# Patient Record
Sex: MALE | Race: WHITE | NOT HISPANIC OR LATINO | Employment: UNEMPLOYED | URBAN - NONMETROPOLITAN AREA
[De-identification: names, ages, dates, MRNs, and addresses within clinical notes are randomized per-mention and may not be internally consistent; named-entity substitution may affect disease eponyms.]

---

## 2017-02-06 DIAGNOSIS — K21.9 GASTROESOPHAGEAL REFLUX DISEASE WITHOUT ESOPHAGITIS: ICD-10-CM

## 2017-02-06 DIAGNOSIS — K20.90 BARRETT'S ESOPHAGUS WITH ESOPHAGITIS: Primary | ICD-10-CM

## 2017-02-06 DIAGNOSIS — K22.70 BARRETT'S ESOPHAGUS WITH ESOPHAGITIS: Primary | ICD-10-CM

## 2017-02-06 NOTE — TELEPHONE ENCOUNTER
Omeprazole      Last Written Prescription Date: 7/12/2016  Last Fill Quantity: 60,  # refills: 6   Last Office Visit with G, P or [facility] prescribing provider: 3-

## 2017-02-06 NOTE — TELEPHONE ENCOUNTER
Prescription approved per [facility] Refill Protocol.    Kimberly Das RN  [facility]

## 2017-05-10 ENCOUNTER — TELEPHONE (OUTPATIENT)
Dept: FAMILY MEDICINE | Facility: OTHER | Age: 52
End: 2017-05-10

## 2017-06-15 ENCOUNTER — OFFICE VISIT (OUTPATIENT)
Dept: FAMILY MEDICINE | Facility: CLINIC | Age: 52
End: 2017-06-15
Payer: COMMERCIAL

## 2017-06-15 VITALS
DIASTOLIC BLOOD PRESSURE: 70 MMHG | SYSTOLIC BLOOD PRESSURE: 116 MMHG | TEMPERATURE: 97.4 F | BODY MASS INDEX: 38.97 KG/M2 | HEART RATE: 80 BPM | RESPIRATION RATE: 16 BRPM | WEIGHT: 287.75 LBS | HEIGHT: 72 IN

## 2017-06-15 DIAGNOSIS — K20.90 BARRETT'S ESOPHAGUS WITH ESOPHAGITIS: Primary | ICD-10-CM

## 2017-06-15 DIAGNOSIS — K21.00 GASTROESOPHAGEAL REFLUX DISEASE WITH ESOPHAGITIS: ICD-10-CM

## 2017-06-15 DIAGNOSIS — K22.70 BARRETT'S ESOPHAGUS WITH ESOPHAGITIS: Primary | ICD-10-CM

## 2017-06-15 DIAGNOSIS — K21.9 GASTROESOPHAGEAL REFLUX DISEASE WITHOUT ESOPHAGITIS: ICD-10-CM

## 2017-06-15 DIAGNOSIS — E78.5 HYPERLIPIDEMIA LDL GOAL <130: ICD-10-CM

## 2017-06-15 PROCEDURE — 99214 OFFICE O/P EST MOD 30 MIN: CPT | Performed by: PHYSICIAN ASSISTANT

## 2017-06-15 RX ORDER — OMEPRAZOLE 40 MG/1
CAPSULE, DELAYED RELEASE ORAL
Qty: 90 CAPSULE | Refills: 1 | Status: SHIPPED | OUTPATIENT
Start: 2017-06-15 | End: 2017-12-21

## 2017-06-15 ASSESSMENT — PAIN SCALES - GENERAL: PAINLEVEL: NO PAIN (0)

## 2017-06-15 NOTE — PROGRESS NOTES
SUBJECTIVE:                                                    Amol Peres is a 52 year old male who presents to clinic today for the following health issues:      Medication Followup of Prilosec    Taking Medication as prescribed: yes    Side Effects:  None    Medication Helping Symptoms:  yes     Problem list and histories reviewed & adjusted, as indicated.  Additional history: as documented    Patient Active Problem List   Diagnosis     Gastroesophageal reflux disease with esophagitis     Harmon's esophagus with esophagitis     HYPERLIPIDEMIA LDL GOAL <130     Hypertension goal BP (blood pressure) < 140/90     Pain in thoracic spine     CTS (carpal tunnel syndrome)     Chondromalacia patellae of left knee     Morbid obesity with body mass index of 40.0-44.9 in adult (H)     Tobacco use disorder     Mild airflow obstruction on pulmonary function test     Past Surgical History:   Procedure Laterality Date     APPENDECTOMY  04/04/10     COLONOSCOPY N/A 10/16/2015    Procedure: COMBINED COLONOSCOPY, SINGLE OR MULTIPLE BIOPSY/POLYPECTOMY BY BIOPSY;  Surgeon: Marcial Colvin MD;  Location:  GI     ESOPHAGOSCOPY, GASTROSCOPY, DUODENOSCOPY (EGD), COMBINED  12/27/2010    COMBINED ESOPHAGOSCOPY, GASTROSCOPY, DUODENOSCOPY (EGD), BIOPSY SINGLE OR MULTIPLE performed by JUSTINA MORGAN at  GI     ESOPHAGOSCOPY, GASTROSCOPY, DUODENOSCOPY (EGD), COMBINED  12/26/2012    Procedure: COMBINED ESOPHAGOSCOPY, GASTROSCOPY, DUODENOSCOPY (EGD), BIOPSY SINGLE OR MULTIPLE;  ESOPHAGOSCOPY, GASTROSCOPY, DUODENOSCOPY WITH  SINGLE BIOPSY;  Surgeon: Justina Morgan MD;  Location:  GI     ESOPHAGOSCOPY, GASTROSCOPY, DUODENOSCOPY (EGD), COMBINED N/A 10/16/2015    Procedure: COMBINED ESOPHAGOSCOPY, GASTROSCOPY, DUODENOSCOPY (EGD), BIOPSY SINGLE OR MULTIPLE;  Surgeon: Marcial Colvin MD;  Location: [facility]     HC UGI ENDOSCOPY DIAG W BIOPSY  8/28/2009     HC UGI ENDOSCOPY, SIMPLE EXAM  09/23/08       Social History  "  Substance Use Topics     Smoking status: Current Every Day Smoker     Packs/day: 1.00     Last attempt to quit: 7/7/2014     Smokeless tobacco: Never Used     Alcohol use Yes      Comment: occasionally     Family History   Problem Relation Age of Onset     CANCER Mother      Cervical     CANCER Maternal Grandfather      CANCER Paternal Grandmother            Reviewed and updated as needed this visit by clinical staff  Tobacco  Allergies  Med Hx  Surg Hx  Fam Hx  Soc Hx      Reviewed and updated as needed this visit by Provider         ROS:  Constitutional, HEENT, cardiovascular, pulmonary, gi and gu systems are negative, except as otherwise noted.    OBJECTIVE:                                                    /70 (BP Location: Right arm, Patient Position: Chair, Cuff Size: Adult Large)  Pulse 80  Temp 97.4  F (36.3  C) (Tympanic)  Resp 16  Ht 6' 0.3\" (1.836 m)  Wt 287 lb 12 oz (130.5 kg)  BMI 38.7 kg/m2  Body mass index is 38.7 kg/(m^2).  GENERAL: healthy, alert and no distress  RESP: lungs clear to auscultation - no rales, rhonchi or wheezes  CV: regular rate and rhythm, normal S1 S2, no S3 or S4, no murmur, click or rub, no peripheral edema and peripheral pulses strong  ABDOMEN: soft, nontender, no hepatosplenomegaly, no masses and bowel sounds normal  MS: no gross musculoskeletal defects noted, no edema  PSYCH: mentation appears normal, affect normal/bright    Diagnostic Test Results:  No results found for this or any previous visit (from the past 24 hour(s)).     ASSESSMENT/PLAN:                                                    BMI:   Estimated body mass index is 38.7 kg/(m^2) as calculated from the following:    Height as of this encounter: 6' 0.3\" (1.836 m).    Weight as of this encounter: 287 lb 12 oz (130.5 kg).   Weight management plan: Discussed healthy diet and exercise guidelines and patient will follow up in 6 months in clinic to re-evaluate.      1. Harmon's esophagus with " esophagitis  Needs recheck of UGI in October of this year  - omeprazole (PRILOSEC) 40 MG capsule; TAKE 1 CAPSULE BY MOUTH DAILY  Dispense: 90 capsule; Refill: 1  - Upper GI Endoscopy (Esophagogastroduodenoscopy/EGD) [63158]; Future  - GASTROENTEROLOGY ADULT REF PROCEDURE ONLY; Future    2. Gastroesophageal reflux disease without esophagitis  Doing well, recheck in 1 year  - omeprazole (PRILOSEC) 40 MG capsule; TAKE 1 CAPSULE BY MOUTH DAILY  Dispense: 90 capsule; Refill: 1  - Upper GI Endoscopy (Esophagogastroduodenoscopy/EGD) [16805]; Future  - GASTROENTEROLOGY ADULT REF PROCEDURE ONLY; Future    3. Gastroesophageal reflux disease with esophagitis  As above  - Upper GI Endoscopy (Esophagogastroduodenoscopy/EGD) [28970]; Future  - GASTROENTEROLOGY ADULT REF PROCEDURE ONLY; Future    4. Hyperlipidemia LDL goal <130  Due for labs soon.  - Lipid Profile with reflex to direct LDL; Future  Arnulfo Reno PA-C  [employer]

## 2017-06-15 NOTE — NURSING NOTE
"Chief Complaint   Patient presents with     Recheck Medication     prilosec       Initial /70 (BP Location: Right arm, Patient Position: Chair, Cuff Size: Adult Large)  Pulse 80  Temp 97.4  F (36.3  C) (Tympanic)  Resp 16  Ht 6' 0.3\" (1.836 m)  Wt 287 lb 12 oz (130.5 kg)  BMI 38.7 kg/m2 Estimated body mass index is 38.7 kg/(m^2) as calculated from the following:    Height as of this encounter: 6' 0.3\" (1.836 m).    Weight as of this encounter: 287 lb 12 oz (130.5 kg).  Medication Reconciliation: complete       Domenica MOSQUEDA LPN    "

## 2017-06-15 NOTE — MR AVS SNAPSHOT
After Visit Summary   6/15/2017    Amol Peres    MRN: [id number]           Patient Information     Date Of Birth          1965        Visit Information        Provider Department      6/15/2017 5:00 PM Arnulfo Damico PA-C [facility]        Today's Diagnoses     Harmon's esophagus with esophagitis    -  1    Gastroesophageal reflux disease without esophagitis        Gastroesophageal reflux disease with esophagitis        Hyperlipidemia LDL goal <130           Follow-ups after your visit        Additional Services     GASTROENTEROLOGY ADULT REF PROCEDURE ONLY       Last Lab Result: Creatinine (mg/dL)       Date                     Value                 03/16/2016               1.23             ----------  Body mass index is 38.7 kg/(m^2).     Needed:  No  Language:  English    Patient will be contacted to schedule procedure.     Please be aware that coverage of these services is subject to the terms and limitations of your health insurance plan.  Call member services at your health plan with any benefit or coverage questions.  Any procedures must be performed at a [place] facility OR coordinated by your clinic's referral office.    Please bring the following with you to your appointment:    (1) Any X-Rays, CTs or MRIs which have been performed.  Contact the facility where they were done to arrange for  prior to your scheduled appointment.    (2) List of current medications   (3) This referral request   (4) Any documents/labs given to you for this referral                  Future tests that were ordered for you today     Open Future Orders        Priority Expected Expires Ordered    Lipid Profile with reflex to direct LDL Routine  9/15/2017 6/15/2017    Upper GI Endoscopy (Esophagogastroduodenoscopy/EGD) [28898] Routine  12/15/2017 6/15/2017    GASTROENTEROLOGY ADULT REF PROCEDURE ONLY Routine 10/19/2017 11/30/2017 6/15/2017            Who to contact     If  "you have questions or need follow up information about today's clinic visit or your schedule please contact [facility] directly at [phone].  Normal or non-critical lab and imaging results will be communicated to you by [employer], letter or phone within 4 business days after the clinic has received the results. If you do not hear from us within 7 days, please contact the clinic through [employer] or phone. If you have a critical or abnormal lab result, we will notify you by phone as soon as possible.  Submit refill requests through [employer] or call your pharmacy and they will forward the refill request to us. Please allow 3 business days for your refill to be completed.          Additional Information About Your Visit        [employer] Information     [employer] lets you send messages to your doctor, view your test results, renew your prescriptions, schedule appointments and more. To sign up, go to [email or web address] . Click on \"Log in\" on the left side of the screen, which will take you to the Welcome page. Then click on \"Sign up Now\" on the right side of the page.     You will be asked to enter the access code listed below, as well as some personal information. Please follow the directions to create your username and password.     Your access code is: [email or web address]  Expires: 2017  5:12 PM     Your access code will  in 90 days. If you need help or a new code, please call your [place] clinic or [phone].        Care EveryWhere ID     This is your Care EveryWhere ID. This could be used by other organizations to access your [place] medical records  [id number]        Your Vitals Were     Pulse Temperature Respirations Height BMI (Body Mass Index)       80 97.4  F (36.3  C) (Tympanic) 16 6' 0.3\" (1.836 m) 38.7 kg/m2        Blood Pressure from Last 3 Encounters:   06/15/17 116/70   16 128/72   10/16/15 129/83    Weight from Last 3 Encounters:   06/15/17 287 lb 12 oz (130.5 kg) " "  03/16/16 (!) 302 lb (137 kg)   11/28/14 292 lb 14.4 oz (132.9 kg)                 Today's Medication Changes          These changes are accurate as of: 6/15/17  5:12 PM.  If you have any questions, ask your nurse or doctor.               These medicines have changed or have updated prescriptions.        Dose/Directions    omeprazole 40 MG capsule   Commonly known as:  priLOSEC   This may have changed:  See the new instructions.   Used for:  Harmon's esophagus with esophagitis, Gastroesophageal reflux disease without esophagitis   Changed by:  Arnulfo Damico PA-C        TAKE 1 CAPSULE BY MOUTH DAILY   Quantity:  90 capsule   Refills:  1            Where to get your medicines      These medications were sent to [id number] - [place], [place] - [address]  [address] [place] [address]     Phone:  [phone]     omeprazole 40 MG capsule                Primary Care Provider    None Specified       No primary provider on file.        Thank you!     Thank you for choosing [facility]  for your care. Our goal is always to provide you with excellent care. Hearing back from our patients is one way we can continue to improve our services. Please take a few minutes to complete the written survey that you may receive in the mail after your visit with us. Thank you!             Your Updated Medication List - Protect others around you: Learn how to safely use, store and throw away your medicines at www.disposemymeds.org.          This list is accurate as of: 6/15/17  5:12 PM.  Always use your most recent med list.                   Brand Name Dispense Instructions for use    ibuprofen 800 MG tablet    ADVIL/MOTRIN    100 tablet    TAKE ONE TABLET BY MOUTH THREE TIMES A DAY       omeprazole 40 MG capsule    priLOSEC    90 capsule    TAKE 1 CAPSULE BY MOUTH DAILY       order for DME     1 Units    Equipment being ordered: \"cock-up splints for carpal tunnel\"  1 Pair for carpal tunnel.       sildenafil 100 MG " cap/tab    VIAGRA    12 tablet    Take 0.5-1 tablets ( mg) by mouth daily as needed for erectile dysfunction Take 30 min to 4 hours before intercourse.  Never use with nitroglycerin, terazosin or doxazosin.

## 2017-06-22 NOTE — TELEPHONE ENCOUNTER
Prescription was sent 6/15/17 for #90 with 1 refills.  Pharmacy notified via E-Prescribe refusal.     Kimberly Das RN  [facility]

## 2017-06-22 NOTE — TELEPHONE ENCOUNTER
Omeprazole      Last Written Prescription Date: 6/15/17  Last Fill Quantity: 90,  # refills: 1   Last Office Visit with G, P or [facility] prescribing provider: 6/15/17

## 2017-08-21 ENCOUNTER — TELEPHONE (OUTPATIENT)
Dept: SURGERY | Facility: CLINIC | Age: 52
End: 2017-08-21

## 2017-08-21 NOTE — TELEPHONE ENCOUNTER
Patient called and rescheduled procedure, now scheduled for 9/1/17 arrive 0630 procedure 0730.  Please mail EGD instructions

## 2017-08-21 NOTE — TELEPHONE ENCOUNTER
Patient called to schedule EGD on 9/29/17 arrive 0700 procedure 0800 with Dr. Colvin.  Please mail EGD instructions.

## 2017-09-01 ENCOUNTER — SURGERY (OUTPATIENT)
Age: 52
End: 2017-09-01

## 2017-09-01 ENCOUNTER — HOSPITAL ENCOUNTER (OUTPATIENT)
Facility: CLINIC | Age: 52
Discharge: HOME | End: 2017-09-01
Attending: INTERNAL MEDICINE | Admitting: INTERNAL MEDICINE
Payer: COMMERCIAL

## 2017-09-01 VITALS
RESPIRATION RATE: 16 BRPM | TEMPERATURE: 97.9 F | OXYGEN SATURATION: 98 % | DIASTOLIC BLOOD PRESSURE: 80 MMHG | SYSTOLIC BLOOD PRESSURE: 131 MMHG

## 2017-09-01 LAB — UPPER GI ENDOSCOPY: NORMAL

## 2017-09-01 PROCEDURE — 40000296 ZZH STATISTIC ENDO RECOVERY CLASS 1:2 FIRST HOUR: Performed by: INTERNAL MEDICINE

## 2017-09-01 PROCEDURE — 25000125 ZZHC RX 250: Performed by: INTERNAL MEDICINE

## 2017-09-01 PROCEDURE — 43239 EGD BIOPSY SINGLE/MULTIPLE: CPT | Performed by: INTERNAL MEDICINE

## 2017-09-01 PROCEDURE — 25000128 H RX IP 250 OP 636: Performed by: INTERNAL MEDICINE

## 2017-09-01 PROCEDURE — 88305 TISSUE EXAM BY PATHOLOGIST: CPT | Mod: 26 | Performed by: INTERNAL MEDICINE

## 2017-09-01 PROCEDURE — 88305 TISSUE EXAM BY PATHOLOGIST: CPT | Performed by: INTERNAL MEDICINE

## 2017-09-01 RX ORDER — ONDANSETRON 2 MG/ML
4 INJECTION INTRAMUSCULAR; INTRAVENOUS
Status: DISCONTINUED | OUTPATIENT
Start: 2017-09-01 | End: 2017-09-01 | Stop reason: HOSPADM

## 2017-09-01 RX ORDER — LIDOCAINE 40 MG/G
CREAM TOPICAL
Status: DISCONTINUED | OUTPATIENT
Start: 2017-09-01 | End: 2017-09-01 | Stop reason: HOSPADM

## 2017-09-01 RX ORDER — FENTANYL CITRATE 50 UG/ML
INJECTION, SOLUTION INTRAMUSCULAR; INTRAVENOUS PRN
Status: DISCONTINUED | OUTPATIENT
Start: 2017-09-01 | End: 2017-09-01 | Stop reason: HOSPADM

## 2017-09-01 RX ADMIN — MIDAZOLAM HYDROCHLORIDE 1 MG: 1 INJECTION, SOLUTION INTRAMUSCULAR; INTRAVENOUS at 07:49

## 2017-09-01 RX ADMIN — MIDAZOLAM HYDROCHLORIDE 1 MG: 1 INJECTION, SOLUTION INTRAMUSCULAR; INTRAVENOUS at 07:42

## 2017-09-01 RX ADMIN — LIDOCAINE HYDROCHLORIDE 1 ML: 10 INJECTION, SOLUTION EPIDURAL; INFILTRATION; INTRACAUDAL; PERINEURAL at 07:02

## 2017-09-01 RX ADMIN — LIDOCAINE HYDROCHLORIDE 5 ML: 20 SOLUTION ORAL; TOPICAL at 07:40

## 2017-09-01 RX ADMIN — MIDAZOLAM HYDROCHLORIDE 1 MG: 1 INJECTION, SOLUTION INTRAMUSCULAR; INTRAVENOUS at 07:43

## 2017-09-01 RX ADMIN — FENTANYL CITRATE 50 MCG: 50 INJECTION, SOLUTION INTRAMUSCULAR; INTRAVENOUS at 07:40

## 2017-09-01 RX ADMIN — MIDAZOLAM HYDROCHLORIDE 2 MG: 1 INJECTION, SOLUTION INTRAMUSCULAR; INTRAVENOUS at 07:40

## 2017-09-01 RX ADMIN — FENTANYL CITRATE 50 MCG: 50 INJECTION, SOLUTION INTRAMUSCULAR; INTRAVENOUS at 07:46

## 2017-09-01 RX ADMIN — MIDAZOLAM HYDROCHLORIDE 1 MG: 1 INJECTION, SOLUTION INTRAMUSCULAR; INTRAVENOUS at 07:41

## 2017-09-01 NOTE — H&P
[facility] GI Pre-Procedure Physical Assessment    Amol Peres MRN# [id number]   Age: 52 year old YOB: 1965      Date of Surgery: 9/1/2017  Location [facility]      Date of Exam 9/1/2017 Facility (Same day)       Home clinic: [facility]  Primary care provider: No primary care provider on file.         Active problem list:   Patient Active Problem List   Diagnosis     Gastroesophageal reflux disease with esophagitis     Harmon's esophagus with esophagitis     HYPERLIPIDEMIA LDL GOAL <130     Hypertension goal BP (blood pressure) < 140/90     Pain in thoracic spine     CTS (carpal tunnel syndrome)     Chondromalacia patellae of left knee     Morbid obesity with body mass index of 40.0-44.9 in adult (H)     Tobacco use disorder     Mild airflow obstruction on pulmonary function test            Medications (include herbals and vitamins):   Any Plavix use in the last 7 days?  No     Current Facility-Administered Medications   Medication     lidocaine 1 % 1 mL     lidocaine (LMX4) kit     sodium chloride (PF) 0.9% PF flush 3 mL     sodium chloride (PF) 0.9% PF flush 3 mL     sodium chloride (PF) 0.9% PF flush 3 mL     ondansetron (ZOFRAN) injection 4 mg             Allergies:      Allergies   Allergen Reactions     No Known Drug Allergies      Allergy to Latex?  No  Allergy to tape?    No          Social History:     Social History   Substance Use Topics     Smoking status: Current Every Day Smoker     Packs/day: 1.00     Last attempt to quit: 7/7/2014     Smokeless tobacco: Never Used     Alcohol use Yes      Comment: occasionally            Physical Exam:   All vitals have been reviewed  Blood pressure 132/87, temperature 97.9  F (36.6  C), temperature source Oral, resp. rate 16, SpO2 97 %.  Airway assessment:   Patient is able to open mouth wide  Patient is able to stick out tongue      Lungs:   No increased work of breathing, good air exchange, clear to  auscultation bilaterally, no crackles or wheezing      Cardiovascular:   Normal apical impulse, regular rate and rhythm, normal S1 and S2, no S3 or S4, and no murmur noted           Lab / Radiology Results:   All laboratory data reviewed          Assessment:   Appropriately NPO  Chief complaint or anatomic assessment of involved area: floyd's         Plan:   Moderate (conscious) sedation     Patient's active problems diagnostically and therapeutically optimized for the planned procedure  Risks, benefits, alternatives to sedation and blood explained and consent obtained  Risks, benefits, alternatives to procedure explained and consent obtained  P2 (patient with mild systemic disease)  Orders and progress notes are in the chart  Discharge from Phase 1 and / or Phase 2 recovery when patient meets criteria    I have reviewed the history and physical, lab finding(s), diagnostic data, medicaitons, and the plan for sedation.  I have determined this patient to be an appropriate candidate for the planned sedation / procedure and have reassessed the patient immediately prior to sedation / procedure.    I have personally and medically directed the administration of medications used.    Kevin Fung MD

## 2017-09-05 LAB — COPATH REPORT: NORMAL

## 2017-12-21 DIAGNOSIS — K22.70 BARRETT'S ESOPHAGUS WITH ESOPHAGITIS: ICD-10-CM

## 2017-12-21 DIAGNOSIS — K20.90 BARRETT'S ESOPHAGUS WITH ESOPHAGITIS: ICD-10-CM

## 2017-12-21 DIAGNOSIS — K21.9 GASTROESOPHAGEAL REFLUX DISEASE WITHOUT ESOPHAGITIS: ICD-10-CM

## 2017-12-21 NOTE — TELEPHONE ENCOUNTER
Reason for Call:  Medication or medication refill:    Do you use a [facility]?  Name of the pharmacy and phone number for the current request:  [facility]    Name of the medication requested: prilosec, will follow up with Arnulfo in [facility]     Other request: none    Can we leave a detailed message on this number? YES    Phone number patient can be reached at: Home number on file [phone] (home)    Best Time: any    Call taken on 12/21/2017 at 10:57 AM by Namita Figueredo

## 2017-12-22 RX ORDER — OMEPRAZOLE 40 MG/1
CAPSULE, DELAYED RELEASE ORAL
Qty: 90 CAPSULE | Refills: 0 | Status: SHIPPED | OUTPATIENT
Start: 2017-12-22 | End: 2018-02-27

## 2018-02-15 ENCOUNTER — TELEPHONE (OUTPATIENT)
Dept: FAMILY MEDICINE | Facility: OTHER | Age: 53
End: 2018-02-15

## 2018-02-15 DIAGNOSIS — Z11.59 NEED FOR HEPATITIS C SCREENING TEST: Primary | ICD-10-CM

## 2018-02-15 DIAGNOSIS — E78.5 HYPERLIPIDEMIA LDL GOAL <130: ICD-10-CM

## 2018-02-15 DIAGNOSIS — I10 HYPERTENSION GOAL BP (BLOOD PRESSURE) < 140/90: ICD-10-CM

## 2018-02-15 NOTE — TELEPHONE ENCOUNTER
Panel Management Review      Patient has the following on his problem list:     Hypertension   Last three blood pressure readings:  BP Readings from Last 3 Encounters:   09/01/17 131/80   06/15/17 116/70   03/16/16 128/72     Blood pressure: Passed    HTN Guidelines:  Age 18-59 BP range:  Less than 140/90  Age 60-85 with Diabetes:  Less than 140/90  Age 60-85 without Diabetes:  less than 150/90      Composite cancer screening  Chart review shows that this patient is due/due soon for the following None  Summary:    Patient is due/failing the following:   Hep c screen, bmp, lipids and FOLLOW UP    Action needed:   Patient needs office visit for medication follow up. Due now, please see if pt is willing to schedule. Patient needs fasting lab only appointment if not doing at appt. Order placed.      Type of outreach:    Phone, left message for patient to call back.     Questions for provider review:    None                                                                                                                                    Arelis Valentin CMA ([facility])       Chart routed to Care Team .

## 2018-02-21 NOTE — PROGRESS NOTES
SUBJECTIVE:   Amol Peres is a 52 year old male who presents to clinic today for the following health issues:      HPI  Medication Followup of Omeprazole    Taking Medication as prescribed: yes    Side Effects:  None    Medication Helping Symptoms:  yes     Joint Pain    Onset: Worse recently    Description:   Location: left knee  Character: Sharp, Dull ache, Stabbing, Gnawing, Burning, Fullness and Cramping    Intensity: mild, severe    Progression of Symptoms: worse    Accompanying Signs & Symptoms:  Other symptoms: weakness of Knee and swelling    History:   Previous similar pain: YES- not this bad      Precipitating factors:   Trauma or overuse: YES    Alleviating factors:  Improved by: nothing    Therapies Tried and outcome: Ibuprofen, rest,       Problem list and histories reviewed & adjusted, as indicated.  Additional history: as documented  The 10-year ASCVD risk score (Poyntellecb MONTES Jr, et al., 2013) is: 16.2%    Values used to calculate the score:      Age: 52 years      Sex: Male      Is Non- : No      Diabetic: No      Tobacco smoker: Yes      Systolic Blood Pressure: 131 mmHg      Is BP treated: No      HDL Cholesterol: 24 mg/dL      Total Cholesterol: 172 mg/dL  Patient is eligible for use of low-dose aspirin for primary prevention of heart attack and stroke.  Provider has discussed aspirin with patient and our decision was:     Contraindicated:  Daily low-dose aspirin for primary prevention contraindicated, provider does not recommend.    Patient Active Problem List   Diagnosis     Gastroesophageal reflux disease with esophagitis     Harmon's esophagus with esophagitis     HYPERLIPIDEMIA LDL GOAL <130     Hypertension goal BP (blood pressure) < 140/90     Pain in thoracic spine     CTS (carpal tunnel syndrome)     Chondromalacia patellae of left knee     Morbid obesity with body mass index of 40.0-44.9 in adult (H)     Tobacco use disorder     Mild airflow obstruction on  pulmonary function test     Gastroesophageal reflux disease without esophagitis     Past Surgical History:   Procedure Laterality Date     APPENDECTOMY  04/04/10     COLONOSCOPY N/A 10/16/2015    Procedure: COMBINED COLONOSCOPY, SINGLE OR MULTIPLE BIOPSY/POLYPECTOMY BY BIOPSY;  Surgeon: Marcial Colvin MD;  Location:  GI     ESOPHAGOSCOPY, GASTROSCOPY, DUODENOSCOPY (EGD), COMBINED  12/27/2010    COMBINED ESOPHAGOSCOPY, GASTROSCOPY, DUODENOSCOPY (EGD), BIOPSY SINGLE OR MULTIPLE performed by JUSTINA CAR at  GI     ESOPHAGOSCOPY, GASTROSCOPY, DUODENOSCOPY (EGD), COMBINED  12/26/2012    Procedure: COMBINED ESOPHAGOSCOPY, GASTROSCOPY, DUODENOSCOPY (EGD), BIOPSY SINGLE OR MULTIPLE;  ESOPHAGOSCOPY, GASTROSCOPY, DUODENOSCOPY WITH  SINGLE BIOPSY;  Surgeon: Justina Car MD;  Location:  GI     ESOPHAGOSCOPY, GASTROSCOPY, DUODENOSCOPY (EGD), COMBINED N/A 10/16/2015    Procedure: COMBINED ESOPHAGOSCOPY, GASTROSCOPY, DUODENOSCOPY (EGD), BIOPSY SINGLE OR MULTIPLE;  Surgeon: Marcial Colvin MD;  Location:  GI     ESOPHAGOSCOPY, GASTROSCOPY, DUODENOSCOPY (EGD), COMBINED N/A 9/1/2017    Procedure: COMBINED ESOPHAGOSCOPY, GASTROSCOPY, DUODENOSCOPY (EGD), BIOPSY SINGLE OR MULTIPLE;  Esophagogastroduodenoscopy, Biopsy by Biopsy;  Surgeon: Kevin Fung MD;  Location:  GI     HC UGI ENDOSCOPY DIAG W BIOPSY  8/28/2009     HC UGI ENDOSCOPY, SIMPLE EXAM  09/23/08       Social History   Substance Use Topics     Smoking status: Current Every Day Smoker     Packs/day: 1.00     Last attempt to quit: 7/7/2014     Smokeless tobacco: Never Used     Alcohol use Yes      Comment: occasionally     Family History   Problem Relation Age of Onset     CANCER Mother      Cervical     CANCER Maternal Grandfather      CANCER Paternal Grandmother          Current Outpatient Prescriptions   Medication Sig Dispense Refill     omeprazole (PRILOSEC) 40 MG capsule TAKE 1 CAPSULE BY MOUTH DAILY 90 capsule 1     ibuprofen  "(ADVIL/MOTRIN) 800 MG tablet TAKE ONE TABLET BY MOUTH THREE TIMES A  tablet 3     sildenafil (VIAGRA) 100 MG tablet Take 0.5-1 tablets ( mg) by mouth daily as needed for erectile dysfunction Take 30 min to 4 hours before intercourse.  Never use with nitroglycerin, terazosin or doxazosin. 12 tablet 0     ORDER FOR DME Equipment being ordered: \"cock-up splints for carpal tunnel\"  1 Pair for carpal tunnel. 1 Units 0     Allergies   Allergen Reactions     No Known Drug Allergies      Recent Labs   Lab Test  03/16/16   1012  11/28/14   0817  04/04/14   1148  11/25/11   1104   A1C   --    --    --   5.4   LDL  77  112  132*  115   HDL  24*  37*  35*  34*   TRIG  354*  164*  203*  230*   ALT  48  40  62   --    CR  1.23  1.06  1.19   --    GFRESTIMATED  62  73  65   --    GFRESTBLACK  75  89  78   --    POTASSIUM  4.3  4.5  4.3   --    TSH  1.84   --   1.59   --       BP Readings from Last 3 Encounters:   02/27/18 130/82   09/01/17 131/80   06/15/17 116/70    Wt Readings from Last 3 Encounters:   02/27/18 291 lb (132 kg)   06/15/17 287 lb 12 oz (130.5 kg)   03/16/16 (!) 302 lb (137 kg)                  Labs reviewed in EPIC    ROS:  Constitutional, HEENT, cardiovascular, pulmonary, gi and gu systems are negative, except as otherwise noted.    OBJECTIVE:     /82 (Cuff Size: Adult Large)  Pulse 80  Temp 98  F (36.7  C) (Temporal)  Resp 18  Ht 6' 0.36\" (1.838 m)  Wt 291 lb (132 kg)  BMI 39.07 kg/m2  Body mass index is 39.07 kg/(m^2).  GENERAL: healthy, alert and no distress  RESP: lungs clear to auscultation - no rales, rhonchi or wheezes  CV: regular rate and rhythm, normal S1 S2, no S3 or S4, no murmur, click or rub, no peripheral edema and peripheral pulses strong  ABDOMEN: soft, nontender, no hepatosplenomegaly, no masses and bowel sounds normal  MS: no gross musculoskeletal defects noted, no edema, antalgic gait noted  SKIN: no suspicious lesions or rashes to visible skin    Diagnostic Test " "Results:  No results found for this or any previous visit (from the past 24 hour(s)).    ASSESSMENT/PLAN:       Tobacco Cessation:   reports that he has been smoking.  He has been smoking about 1.00 pack per day. He has never used smokeless tobacco.  Tobacco Cessation Action Plan: Information offered: Patient not interested at this time    BMI:   Estimated body mass index is 39.07 kg/(m^2) as calculated from the following:    Height as of this encounter: 6' 0.36\" (1.838 m).    Weight as of this encounter: 291 lb (132 kg).   Weight management plan: Discussed healthy diet and exercise guidelines and patient will follow up in 6 months in clinic to re-evaluate.    1. Harmon's esophagus with esophagitis  2. Gastroesophageal reflux disease without esophagitis  Refilled as requested  - omeprazole (PRILOSEC) 40 MG capsule; TAKE 1 CAPSULE BY MOUTH DAILY  Dispense: 90 capsule; Refill: 1    3. Chondromalacia patellae of left knee  4. Instability of knee joint, left  Will have orthopedics evaluate and treat as needed.  - ibuprofen (ADVIL/MOTRIN) 800 MG tablet; TAKE ONE TABLET BY MOUTH THREE TIMES A DAY  Dispense: 100 tablet; Refill: 3  - ORTHOPEDICS ADULT REFERRAL    Work on weight loss  Regular exercise    Arnulfo Reno PA-C  [facility]  "

## 2018-02-27 ENCOUNTER — OFFICE VISIT (OUTPATIENT)
Dept: FAMILY MEDICINE | Facility: OTHER | Age: 53
End: 2018-02-27
Payer: COMMERCIAL

## 2018-02-27 ENCOUNTER — RADIANT APPOINTMENT (OUTPATIENT)
Dept: GENERAL RADIOLOGY | Facility: OTHER | Age: 53
End: 2018-02-27
Attending: PHYSICIAN ASSISTANT
Payer: COMMERCIAL

## 2018-02-27 VITALS
DIASTOLIC BLOOD PRESSURE: 82 MMHG | HEIGHT: 72 IN | WEIGHT: 291 LBS | TEMPERATURE: 98 F | BODY MASS INDEX: 39.42 KG/M2 | SYSTOLIC BLOOD PRESSURE: 130 MMHG | RESPIRATION RATE: 18 BRPM | HEART RATE: 80 BPM

## 2018-02-27 DIAGNOSIS — K21.9 GASTROESOPHAGEAL REFLUX DISEASE WITHOUT ESOPHAGITIS: ICD-10-CM

## 2018-02-27 DIAGNOSIS — K22.70 BARRETT'S ESOPHAGUS WITH ESOPHAGITIS: ICD-10-CM

## 2018-02-27 DIAGNOSIS — K20.90 BARRETT'S ESOPHAGUS WITH ESOPHAGITIS: ICD-10-CM

## 2018-02-27 DIAGNOSIS — M22.42 CHONDROMALACIA PATELLAE OF LEFT KNEE: Primary | ICD-10-CM

## 2018-02-27 DIAGNOSIS — M25.362 INSTABILITY OF KNEE JOINT, LEFT: ICD-10-CM

## 2018-02-27 DIAGNOSIS — M22.42 CHONDROMALACIA PATELLAE OF LEFT KNEE: ICD-10-CM

## 2018-02-27 PROCEDURE — 73565 X-RAY EXAM OF KNEES: CPT | Mod: FY

## 2018-02-27 PROCEDURE — 99214 OFFICE O/P EST MOD 30 MIN: CPT | Performed by: PHYSICIAN ASSISTANT

## 2018-02-27 RX ORDER — IBUPROFEN 800 MG/1
TABLET, FILM COATED ORAL
Qty: 100 TABLET | Refills: 3 | Status: SHIPPED | OUTPATIENT
Start: 2018-02-27 | End: 2019-05-13

## 2018-02-27 RX ORDER — OMEPRAZOLE 40 MG/1
CAPSULE, DELAYED RELEASE ORAL
Qty: 90 CAPSULE | Refills: 1 | Status: SHIPPED | OUTPATIENT
Start: 2018-02-27 | End: 2018-09-09

## 2018-02-27 ASSESSMENT — PAIN SCALES - GENERAL: PAINLEVEL: SEVERE PAIN (6)

## 2018-02-27 NOTE — NURSING NOTE
"Chief Complaint   Patient presents with     Recheck Medication     acid reflux meds     Panel Management     mychart, asa, flu shot, hep c, tobacco cessation, lipids, bmp       Initial /82 (Cuff Size: Adult Large)  Pulse 80  Temp 98  F (36.7  C) (Temporal)  Resp 18  Ht 6' 0.36\" (1.838 m)  Wt 291 lb (132 kg)  BMI 39.07 kg/m2 Estimated body mass index is 39.07 kg/(m^2) as calculated from the following:    Height as of this encounter: 6' 0.36\" (1.838 m).    Weight as of this encounter: 291 lb (132 kg).  Medication Reconciliation: complete  "

## 2018-02-27 NOTE — MR AVS SNAPSHOT
After Visit Summary   2/27/2018    Amol Peres    MRN: [id number]           Patient Information     Date Of Birth          1965        Visit Information        Provider Department      2/27/2018 4:00 PM Arnulfo Damico PA-C [facility] [place]        Today's Diagnoses     Chondromalacia patellae of left knee    -  1    Harmon's esophagus with esophagitis        Gastroesophageal reflux disease without esophagitis        Instability of knee joint, left           Follow-ups after your visit        Additional Services     ORTHOPEDICS ADULT REFERRAL       Your provider has referred you to: [facility]: [facility] [phone]   [email or web address]    Please be aware that coverage of these services is subject to the terms and limitations of your health insurance plan.  Call member services at your health plan with any benefit or coverage questions.      Please bring the following to your appointment:    >>   Any x-rays, CTs or MRIs which have been performed.  Contact the facility where they were done to arrange for  prior to your scheduled appointment.    >>   List of current medications   >>   This referral request   >>   Any documents/labs given to you for this referral                  Who to contact     If you have questions or need follow up information about today's clinic visit or your schedule please contact [facility] directly at [phone].  Normal or non-critical lab and imaging results will be communicated to you by MyChart, letter or phone within 4 business days after the clinic has received the results. If you do not hear from us within 7 days, please contact the clinic through MyChart or phone. If you have a critical or abnormal lab result, we will notify you by phone as soon as possible.  Submit refill requests through [employer] or call your pharmacy and they will forward the refill request to us.  "Please allow 3 business days for your refill to be completed.          Additional Information About Your Visit        MyChart Information     [employer] lets you send messages to your doctor, view your test results, renew your prescriptions, schedule appointments and more. To sign up, go to [email or web address] . Click on \"Log in\" on the left side of the screen, which will take you to the Welcome page. Then click on \"Sign up Now\" on the right side of the page.     You will be asked to enter the access code listed below, as well as some personal information. Please follow the directions to create your username and password.     Your access code is: [email or web address]  Expires: 2018  4:40 PM     Your access code will  in 90 days. If you need help or a new code, please call your [place] clinic or [phone].        Care EveryWhere ID     This is your Care EveryWhere ID. This could be used by other organizations to access your [facility] medical records  [id number]        Your Vitals Were     Pulse Temperature Respirations Height BMI (Body Mass Index)       80 98  F (36.7  C) (Temporal) 18 6' 0.36\" (1.838 m) 39.07 kg/m2        Blood Pressure from Last 3 Encounters:   18 130/82   17 131/80   06/15/17 116/70    Weight from Last 3 Encounters:   18 291 lb (132 kg)   06/15/17 287 lb 12 oz (130.5 kg)   16 (!) 302 lb (137 kg)              We Performed the Following     ORTHOPEDICS ADULT REFERRAL          Today's Medication Changes          These changes are accurate as of 18  4:40 PM.  If you have any questions, ask your nurse or doctor.               These medicines have changed or have updated prescriptions.        Dose/Directions    ibuprofen 800 MG tablet   Commonly known as:  ADVIL/MOTRIN   This may have changed:  See the new instructions.   Used for:  Chondromalacia patellae of left knee   Changed by:  Arnulfo Damico PA-C        TAKE ONE TABLET BY MOUTH THREE TIMES A DAY   Quantity:  " 100 tablet   Refills:  3            Where to get your medicines      These medications were sent to [id number] - [place], [place] - [address]  [address] [place] [address] [place] [address]     Phone:  [phone]     ibuprofen 800 MG tablet    omeprazole 40 MG capsule                Primary Care Provider Office Phone # Fax #    [facility] [phone]       [address] [facility] [address]        Equal Access to Services     ELIZABETH CARRASCO : Hadii aad ku hadasho Soomaali, waaxda luqadaha, qaybta kaalmada adeegyada, waxay idiin hayaan adeeg kharash lamarvin hayden. So [facility] [phone].    ATENCIÓN: Si habla español, tiene a kaur disposición servicios gratuitos de asistencia lingüística. Llame al [phone].    We comply with applicable federal civil rights laws and [place] laws. We do not discriminate on the basis of race, color, national origin, age, disability, sex, sexual orientation, or gender identity.            Thank you!     Thank you for choosing [facility]  for your care. Our goal is always to provide you with excellent care. Hearing back from our patients is one way we can continue to improve our services. Please take a few minutes to complete the written survey that you may receive in the mail after your visit with us. Thank you!             Your Updated Medication List - Protect others around you: Learn how to safely use, store and throw away your medicines at www.disposemymeds.org.          This list is accurate as of 2/27/18  4:40 PM.  Always use your most recent med list.                   Brand Name Dispense Instructions for use Diagnosis    ibuprofen 800 MG tablet    ADVIL/MOTRIN    100 tablet    TAKE ONE TABLET BY MOUTH THREE TIMES A DAY    Chondromalacia patellae of left knee       omeprazole 40 MG capsule    priLOSEC    90 capsule    TAKE 1 CAPSULE BY MOUTH DAILY    Harmon's esophagus with esophagitis, Gastroesophageal reflux disease without esophagitis       order  "for DME     1 Units    Equipment being ordered: \"cock-up splints for carpal tunnel\"  1 Pair for carpal tunnel.    CTS (carpal tunnel syndrome), unspecified laterality       sildenafil 100 MG tablet    VIAGRA    12 tablet    Take 0.5-1 tablets ( mg) by mouth daily as needed for erectile dysfunction Take 30 min to 4 hours before intercourse.  Never use with nitroglycerin, terazosin or doxazosin.    ED (erectile dysfunction)         "

## 2018-03-13 ENCOUNTER — OFFICE VISIT (OUTPATIENT)
Dept: ORTHOPEDICS | Facility: CLINIC | Age: 53
End: 2018-03-13
Payer: COMMERCIAL

## 2018-03-13 ENCOUNTER — RADIANT APPOINTMENT (OUTPATIENT)
Dept: GENERAL RADIOLOGY | Facility: CLINIC | Age: 53
End: 2018-03-13
Attending: PHYSICAL MEDICINE & REHABILITATION
Payer: COMMERCIAL

## 2018-03-13 VITALS — HEIGHT: 72 IN

## 2018-03-13 DIAGNOSIS — M22.2X2 PATELLOFEMORAL PAIN SYNDROME OF LEFT KNEE: ICD-10-CM

## 2018-03-13 DIAGNOSIS — G89.29 CHRONIC PAIN OF LEFT KNEE: Primary | ICD-10-CM

## 2018-03-13 DIAGNOSIS — M25.562 LEFT KNEE PAIN: ICD-10-CM

## 2018-03-13 DIAGNOSIS — M25.562 CHRONIC PAIN OF LEFT KNEE: Primary | ICD-10-CM

## 2018-03-13 PROCEDURE — 99243 OFF/OP CNSLTJ NEW/EST LOW 30: CPT | Performed by: PHYSICAL MEDICINE & REHABILITATION

## 2018-03-13 PROCEDURE — 73560 X-RAY EXAM OF KNEE 1 OR 2: CPT | Mod: TC

## 2018-03-13 RX ORDER — PREDNISONE 10 MG/1
TABLET ORAL
COMMUNITY
Start: 2018-03-08 | End: 2018-04-30

## 2018-03-13 RX ORDER — ALBUTEROL SULFATE 90 UG/1
2 AEROSOL, METERED RESPIRATORY (INHALATION)
COMMUNITY
Start: 2018-03-08 | End: 2019-04-18

## 2018-03-13 RX ORDER — DOXYCYCLINE 100 MG/1
100 CAPSULE ORAL
COMMUNITY
Start: 2018-03-08 | End: 2018-03-18

## 2018-03-13 NOTE — PROGRESS NOTES
Sports Medicine Clinic Visit    CC: Patient presents with:  Knee left      HPI:  Amol Peres is a 52 year old male who is seen in consultation at the request of Arnulfo Damico.   He notes left knee pain that began 4-5 years ago with insidious onset. He notes radiating pain down to the ankle.  He rates the pain at a 10/10 at its worst and a 6/10 currently.  Symptoms are relieved with ibuprofen slightly. Symptoms are worsened by flexion, standing, walking, and twisting. He endorses swelling, instability, numbness and tingling.   He denies bruising, popping, grinding, catching, locking, weakness, pain in other joints and fever, chills.  Other treatment has included oral steroids.  He is currently on doxycycline and prednisone for an acute respiratory illness.  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  He had an MRI in 2015 revealing no meniscal, cruciate ligament, or collateral ligament tear, articular cartilage defect, or osteochondral lesion.  He has had no new injuries since then.      Review of Systems:  Musculoskeletal: as above  Remainder of review of systems is negative including constitutional, eyes, ENT, CV, pulmonary, GI, , endocrine, skin, hematologic, and neurologic except as noted in HPI or medical history.    History reviewed. No pertinent past surgical/medical/family/social history other than as mentioned in HPI.    Past Medical History:   Diagnosis Date     Chondromalacia patellae of left knee 11/28/2014     CTS (carpal tunnel syndrome) 11/28/2014    right > left      GERD (gastroesophageal reflux disease)      Hiatal hernia 2008     Hypertension 3/26/2010     Mild airflow obstruction on pulmonary function test 4/6/2016     Morbid obesity with body mass index of 40.0-44.9 in adult (H) 3/16/2016     Tobacco use disorder 3/16/2016     Past Surgical History:   Procedure Laterality Date     APPENDECTOMY  04/04/10     COLONOSCOPY N/A 10/16/2015    Procedure: COMBINED COLONOSCOPY, SINGLE  OR MULTIPLE BIOPSY/POLYPECTOMY BY BIOPSY;  Surgeon: Marcial Colvin MD;  Location:  GI     ESOPHAGOSCOPY, GASTROSCOPY, DUODENOSCOPY (EGD), COMBINED  12/27/2010    COMBINED ESOPHAGOSCOPY, GASTROSCOPY, DUODENOSCOPY (EGD), BIOPSY SINGLE OR MULTIPLE performed by JUSTINA CAR at  GI     ESOPHAGOSCOPY, GASTROSCOPY, DUODENOSCOPY (EGD), COMBINED  12/26/2012    Procedure: COMBINED ESOPHAGOSCOPY, GASTROSCOPY, DUODENOSCOPY (EGD), BIOPSY SINGLE OR MULTIPLE;  ESOPHAGOSCOPY, GASTROSCOPY, DUODENOSCOPY WITH  SINGLE BIOPSY;  Surgeon: Justina Car MD;  Location:  GI     ESOPHAGOSCOPY, GASTROSCOPY, DUODENOSCOPY (EGD), COMBINED N/A 10/16/2015    Procedure: COMBINED ESOPHAGOSCOPY, GASTROSCOPY, DUODENOSCOPY (EGD), BIOPSY SINGLE OR MULTIPLE;  Surgeon: Marcial Colvin MD;  Location:  GI     ESOPHAGOSCOPY, GASTROSCOPY, DUODENOSCOPY (EGD), COMBINED N/A 9/1/2017    Procedure: COMBINED ESOPHAGOSCOPY, GASTROSCOPY, DUODENOSCOPY (EGD), BIOPSY SINGLE OR MULTIPLE;  Esophagogastroduodenoscopy, Biopsy by Biopsy;  Surgeon: Kevin Fung MD;  Location:  GI     HC UGI ENDOSCOPY DIAG W BIOPSY  8/28/2009     HC UGI ENDOSCOPY, SIMPLE EXAM  09/23/08     Family History   Problem Relation Age of Onset     CANCER Mother      Cervical     CANCER Maternal Grandfather      CANCER Paternal Grandmother      Social History     Social History     Marital status:      Spouse name: N/A     Number of children: N/A     Years of education: N/A     Occupational History     Not on file.     Social History Main Topics     Smoking status: Current Every Day Smoker     Packs/day: 1.00     Last attempt to quit: 7/7/2014     Smokeless tobacco: Never Used     Alcohol use Yes      Comment: occasionally     Drug use: No     Sexual activity: Yes     Partners: Female     Other Topics Concern      Service No     Blood Transfusions No     Caffeine Concern No     Occupational Exposure No     Hobby Hazards No     Sleep Concern No      "Stress Concern Yes     Weight Concern Yes     Special Diet No     Back Care No     Exercise No     Bike Helmet No     Seat Belt Yes     Self-Exams No     Parent/Sibling W/ Cabg, Mi Or Angioplasty Before 65f 55m? No     Social History Narrative       He works as atruck     Current Outpatient Prescriptions   Medication     doxycycline (VIBRAMYCIN) 100 MG capsule     predniSONE (DELTASONE) 10 MG tablet     albuterol (PROAIR HFA/PROVENTIL HFA/VENTOLIN HFA) 108 (90 BASE) MCG/ACT Inhaler     omeprazole (PRILOSEC) 40 MG capsule     ibuprofen (ADVIL/MOTRIN) 800 MG tablet     sildenafil (VIAGRA) 100 MG tablet     ORDER FOR DME     No current facility-administered medications for this visit.      Allergies   Allergen Reactions     No Known Drug Allergies          Objective:  Ht 6' 0.36\" (1.838 m)    General: Alert and in no distress    Head: Normocephalic, atraumatic  Eyes: no scleral icterus or conjunctival erythema   Oropharynx:  Mucous membranes moist  Skin: no erythema, petechiae, or jaundice  CV: regular rhythm by palpation, 2+ distal pulses  Resp: normal respiratory effort without conversational dyspnea   Psych: normal mood and affect    Gait: Non-antalgic, appropriate coordination and balance   Neuro: Motor strength and sensation as noted below    Musculoskeletal:    Bilateral Knee exam    Inspection:  No erythema, ecchymosis, warmth, or edema    Palpation: Tender over the left inferior medial patellar facet.    Knee ROM: Full active and passive ROM without pain    Hip ROM: Full active and passive ROM without pain    Patellar Motion:      Normal patellar tracking noted through range of motion    Strength:  5/5 Hip flexion/abduction/adduction, knee extension/flexion, ankle plantarflexion/dorsiflexion, great toe extension, toe flexion    Special Tests: Negative log roll, negative anterior/posterior drawer, negative Lachman's, no varus/valgus instability at 0 and 30 degrees, Edelmira's causes popping at the patellae " bilaterally    Sensation:  Intact to light touch in the bilateral lower extremities        Radiology:  Independent visualization of images performed.    MRI LEFT KNEE WITHOUT CONTRAST 12/3/2014, 2:19 PM     HISTORY:  Chondromalacia of patella.     TECHNIQUE:  Sagittal proton density and T2, coronal T1, and coronal  and transverse fat suppressed T2 weighted images.     FINDINGS:   Menisci:  No intrasubstance meniscal signal abnormality or displaced  meniscal flap.     Cruciate ligaments:  The anterior and posterior cruciate ligaments  appear within normal limits.     Collateral supporting structures:  The tibial and fibular collateral  ligaments, biceps femoris and popliteal tendons, and iliotibial tract  appear intact.     Osseous structures and cartilaginous surfaces:  Articular cartilage  surfaces in the medial, lateral, and patellofemoral compartments  appear within normal limits. Marrow signal is within normal limits.     Additional findings:  There is a small joint effusion and a several  millimeter popliteal cyst. The quadriceps and infrapatellar tendons  appear within normal limits.     IMPRESSION:    1. Nonspecific joint effusion.  2. No meniscal, cruciate ligament, or collateral ligament tear. No  apparent articular cartilage defect or osteochondral lesion.     JUSTINA SMITH MD    KNEE AP STANDING BILATERAL   2/27/2018 4:38 PM      HISTORY: Left knee pain and instability. Chondromalacia patellae of  left knee. Instability of knee joint, left.     COMPARISON: Left knee x-rays dated 11/28/2014 and right knee x-rays  dated 4/1/2013. MRI left knee dated 12/3/2014.     FINDINGS: Suggestion of minimal medial compartment joint space loss  bilaterally. The lateral compartment joint spaces are well-maintained.  No fracture or malalignment.         IMPRESSION:  1. Suggestion of mild medial compartment joint space loss bilateral  knees. This finding could be artifactual.  2. Otherwise negative single AP weightbearing  views of the bilateral  knees.     JIMY MERCADO MD    Assessment:  1. Chronic pain of left knee    2. Patellofemoral pain syndrome of left knee        Plan:  Discussed the assessment with the patient and developed a plan together:  -Reviewed MRI from 2014 which reveals no internal derangement of the knee.  He denies any new injuries since this image.  Also reviewed knee radiographs which suggest mild degenerative change.  It seems that most of his knee pain is functional, likely a combination of patellofemoral syndrome and deconditioning.    -Physical therapy ordered at the [facility] in [place].  Please do 5-6 days of exercises per week between formal sessions and the home exercises they provide.  -Patellar stabilization brace with horseshoe as needed for comfort/support.  -Continue weight loss efforts.  Could also consider referral to nutritionist.  -Recommend low impact cardiovascular activity (i.e. walking, elliptical, stationary bike, swimming, water aerobics)  -Ice or heat 15-20 minutes as needed (Avoid sleeping on a heating pad or ice)  -Patient's preferred over the counter medications as directed on packaging as needed for pain or soreness.  Please take ibuprofen with food. Do not premedicate prior to activity.  -Could also consider steroid injection but would want to wait until he has completed his antibiotics/oral steroids for his acute respiratory illness and is feeling better.  He can call to schedule an injection with me at that time.      -Follow up in 6-8 weeks or sooner if symptoms fail to improve or worsen.  Please call with questions or concerns.        Namita Hill MD, [facility]

## 2018-03-13 NOTE — LETTER
3/13/2018         RE: Amol Peres  [address]  [facility] [id number]        Dear Colleague,    Thank you for referring your patient, Amol Peres, to the [facility]. Please see a copy of my visit note below.    Sports Medicine Clinic Visit    CC: Patient presents with:  Knee left      HPI:  Amol Peres is a 52 year old male who is seen in consultation at the request of Arnulfo Damico.   He notes left knee pain that began 4-5 years ago with insidious onset. He notes radiating pain down to the ankle.  He rates the pain at a 10/10 at its worst and a 6/10 currently.  Symptoms are relieved with ibuprofen slightly. Symptoms are worsened by flexion, standing, walking, and twisting. He endorses swelling, instability, numbness and tingling.   He denies bruising, popping, grinding, catching, locking, weakness, pain in other joints and fever, chills.  Other treatment has included oral steroids.  He is currently on doxycycline and prednisone for an acute respiratory illness.  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  He had an MRI in 2015 revealing no meniscal, cruciate ligament, or collateral ligament tear, articular cartilage defect, or osteochondral lesion.  He has had no new injuries since then.      Review of Systems:  Musculoskeletal: as above  Remainder of review of systems is negative including constitutional, eyes, ENT, CV, pulmonary, GI, , endocrine, skin, hematologic, and neurologic except as noted in HPI or medical history.    History reviewed. No pertinent past surgical/medical/family/social history other than as mentioned in HPI.    Past Medical History:   Diagnosis Date     Chondromalacia patellae of left knee 11/28/2014     CTS (carpal tunnel syndrome) 11/28/2014    right > left      GERD (gastroesophageal reflux disease)      Hiatal hernia 2008     Hypertension 3/26/2010     Mild airflow obstruction on pulmonary function test 4/6/2016     Morbid obesity  with body mass index of 40.0-44.9 in adult (H) 3/16/2016     Tobacco use disorder 3/16/2016     Past Surgical History:   Procedure Laterality Date     APPENDECTOMY  04/04/10     COLONOSCOPY N/A 10/16/2015    Procedure: COMBINED COLONOSCOPY, SINGLE OR MULTIPLE BIOPSY/POLYPECTOMY BY BIOPSY;  Surgeon: Marcial Colvin MD;  Location:  GI     ESOPHAGOSCOPY, GASTROSCOPY, DUODENOSCOPY (EGD), COMBINED  12/27/2010    COMBINED ESOPHAGOSCOPY, GASTROSCOPY, DUODENOSCOPY (EGD), BIOPSY SINGLE OR MULTIPLE performed by JUSTINA CAR at  GI     ESOPHAGOSCOPY, GASTROSCOPY, DUODENOSCOPY (EGD), COMBINED  12/26/2012    Procedure: COMBINED ESOPHAGOSCOPY, GASTROSCOPY, DUODENOSCOPY (EGD), BIOPSY SINGLE OR MULTIPLE;  ESOPHAGOSCOPY, GASTROSCOPY, DUODENOSCOPY WITH  SINGLE BIOPSY;  Surgeon: Justina Car MD;  Location:  GI     ESOPHAGOSCOPY, GASTROSCOPY, DUODENOSCOPY (EGD), COMBINED N/A 10/16/2015    Procedure: COMBINED ESOPHAGOSCOPY, GASTROSCOPY, DUODENOSCOPY (EGD), BIOPSY SINGLE OR MULTIPLE;  Surgeon: Marcial Colvin MD;  Location:  GI     ESOPHAGOSCOPY, GASTROSCOPY, DUODENOSCOPY (EGD), COMBINED N/A 9/1/2017    Procedure: COMBINED ESOPHAGOSCOPY, GASTROSCOPY, DUODENOSCOPY (EGD), BIOPSY SINGLE OR MULTIPLE;  Esophagogastroduodenoscopy, Biopsy by Biopsy;  Surgeon: Kevin Fung MD;  Location:  GI      UGI ENDOSCOPY DIAG W BIOPSY  8/28/2009     HC UGI ENDOSCOPY, SIMPLE EXAM  09/23/08     Family History   Problem Relation Age of Onset     CANCER Mother      Cervical     CANCER Maternal Grandfather      CANCER Paternal Grandmother      Social History     Social History     Marital status:      Spouse name: N/A     Number of children: N/A     Years of education: N/A     Occupational History     Not on file.     Social History Main Topics     Smoking status: Current Every Day Smoker     Packs/day: 1.00     Last attempt to quit: 7/7/2014     Smokeless tobacco: Never Used     Alcohol use Yes      Comment:  "occasionally     Drug use: No     Sexual activity: Yes     Partners: Female     Other Topics Concern      Service No     Blood Transfusions No     Caffeine Concern No     Occupational Exposure No     Hobby Hazards No     Sleep Concern No     Stress Concern Yes     Weight Concern Yes     Special Diet No     Back Care No     Exercise No     Bike Helmet No     Seat Belt Yes     Self-Exams No     Parent/Sibling W/ Cabg, Mi Or Angioplasty Before 65f 55m? No     Social History Narrative       He works as atruck     Current Outpatient Prescriptions   Medication     doxycycline (VIBRAMYCIN) 100 MG capsule     predniSONE (DELTASONE) 10 MG tablet     albuterol (PROAIR HFA/PROVENTIL HFA/VENTOLIN HFA) 108 (90 BASE) MCG/ACT Inhaler     omeprazole (PRILOSEC) 40 MG capsule     ibuprofen (ADVIL/MOTRIN) 800 MG tablet     sildenafil (VIAGRA) 100 MG tablet     ORDER FOR DME     No current facility-administered medications for this visit.      Allergies   Allergen Reactions     No Known Drug Allergies          Objective:  Ht 6' 0.36\" (1.838 m)    General: Alert and in no distress    Head: Normocephalic, atraumatic  Eyes: no scleral icterus or conjunctival erythema   Oropharynx:  Mucous membranes moist  Skin: no erythema, petechiae, or jaundice  CV: regular rhythm by palpation, 2+ distal pulses  Resp: normal respiratory effort without conversational dyspnea   Psych: normal mood and affect    Gait: Non-antalgic, appropriate coordination and balance   Neuro: Motor strength and sensation as noted below    Musculoskeletal:    Bilateral Knee exam    Inspection:  No erythema, ecchymosis, warmth, or edema    Palpation: Tender over the left inferior medial patellar facet.    Knee ROM: Full active and passive ROM without pain    Hip ROM: Full active and passive ROM without pain    Patellar Motion:      Normal patellar tracking noted through range of motion    Strength:  5/5 Hip flexion/abduction/adduction, knee extension/flexion, " ankle plantarflexion/dorsiflexion, great toe extension, toe flexion    Special Tests: Negative log roll, negative anterior/posterior drawer, negative Lachman's, no varus/valgus instability at 0 and 30 degrees, Edelmira's causes popping at the patellae bilaterally    Sensation:  Intact to light touch in the bilateral lower extremities        Radiology:  Independent visualization of images performed.    MRI LEFT KNEE WITHOUT CONTRAST 12/3/2014, 2:19 PM     HISTORY:  Chondromalacia of patella.     TECHNIQUE:  Sagittal proton density and T2, coronal T1, and coronal  and transverse fat suppressed T2 weighted images.     FINDINGS:   Menisci:  No intrasubstance meniscal signal abnormality or displaced  meniscal flap.     Cruciate ligaments:  The anterior and posterior cruciate ligaments  appear within normal limits.     Collateral supporting structures:  The tibial and fibular collateral  ligaments, biceps femoris and popliteal tendons, and iliotibial tract  appear intact.     Osseous structures and cartilaginous surfaces:  Articular cartilage  surfaces in the medial, lateral, and patellofemoral compartments  appear within normal limits. Marrow signal is within normal limits.     Additional findings:  There is a small joint effusion and a several  millimeter popliteal cyst. The quadriceps and infrapatellar tendons  appear within normal limits.     IMPRESSION:    1. Nonspecific joint effusion.  2. No meniscal, cruciate ligament, or collateral ligament tear. No  apparent articular cartilage defect or osteochondral lesion.     JUSTINA SMITH MD    KNEE AP STANDING BILATERAL   2/27/2018 4:38 PM      HISTORY: Left knee pain and instability. Chondromalacia patellae of  left knee. Instability of knee joint, left.     COMPARISON: Left knee x-rays dated 11/28/2014 and right knee x-rays  dated 4/1/2013. MRI left knee dated 12/3/2014.     FINDINGS: Suggestion of minimal medial compartment joint space loss  bilaterally. The lateral  compartment joint spaces are well-maintained.  No fracture or malalignment.         IMPRESSION:  1. Suggestion of mild medial compartment joint space loss bilateral  knees. This finding could be artifactual.  2. Otherwise negative single AP weightbearing views of the bilateral  knees.     JIMY MERCADO MD    Assessment:  1. Chronic pain of left knee    2. Patellofemoral pain syndrome of left knee        Plan:  Discussed the assessment with the patient and developed a plan together:  -Reviewed MRI from 2014 which reveals no internal derangement of the knee.  He denies any new injuries since this image.  Also reviewed knee radiographs which suggest mild degenerative change.  It seems that most of his knee pain is functional, likely a combination of patellofemoral syndrome and deconditioning.    -Physical therapy ordered at the [facility] in [place].  Please do 5-6 days of exercises per week between formal sessions and the home exercises they provide.  -Patellar stabilization brace with horseshoe as needed for comfort/support.  -Continue weight loss efforts.  Could also consider referral to nutritionist.  -Recommend low impact cardiovascular activity (i.e. walking, elliptical, stationary bike, swimming, water aerobics)  -Ice or heat 15-20 minutes as needed (Avoid sleeping on a heating pad or ice)  -Patient's preferred over the counter medications as directed on packaging as needed for pain or soreness.  Please take ibuprofen with food. Do not premedicate prior to activity.  -Could also consider steroid injection but would want to wait until he has completed his antibiotics/oral steroids for his acute respiratory illness and is feeling better.  He can call to schedule an injection with me at that time.      -Follow up in 6-8 weeks or sooner if symptoms fail to improve or worsen.  Please call with questions or concerns.        Namita Hill MD, CAQ  [facility]      Again, thank  you for allowing me to participate in the care of your patient.        Sincerely,        Daiana Hill MD

## 2018-03-13 NOTE — MR AVS SNAPSHOT
After Visit Summary   3/13/2018    Amol Peres    MRN: [id number]           Patient Information     Date Of Birth          1965        Visit Information        Provider Department      3/13/2018 1:00 PM Daiana Hill MD [facility]        Today's Diagnoses     Left knee pain    -  1    Patellofemoral pain syndrome of left knee          Care Instructions    -Physical therapy ordered at the [facility] [place].  [address], [place] [address].  Please do 5-6 days of exercises per week between formal sessions and the home exercises they provide.  -Brace as needed for comfort/support.  -Continue weight loss efforts.  Could also consider referral to nutritionist.  -Recommend low impact cardiovascular activity (-i.e. walking, elliptical, stationary bike, swimming, water aerobics)  -Ice or heat 15-20 minutes as needed (Avoid sleeping on a heating pad or ice)  -Patient's preferred over the counter medications as directed on packaging as needed for pain or soreness.  Please take ibuprofen with food. Do not premedicate prior to activity.  -Could also consider steroid injection but would want to wait until you have completed your antibiotics/oral steroids and feeling better.  You can call to schedule an injection with me at that time.      -Follow up in 6-8 weeks or sooner if symptoms fail to improve or worsen.  Please call with questions or concerns.              Follow-ups after your visit        Additional Services     [facility] PT, HAND, AND CHIROPRACTIC REFERRAL       **This order will print in the [facility] Scheduling Office**     Physical Therapy, Hand Therapy and Chiropractic Care are available through:    *[facility]  *[facility]  *[facility]    Call one number to schedule at any of the above locations: [phone].    Your provider has referred you to: Physical Therapy at [facility] or  "[facility]    Indication/Reason for Referral: Knee Pain  Onset of Illness: chronic  Therapy Orders: Evaluate and Treat  Special Programs: None  Special Request: None    Jeffry Teran      Additional Comments for the Therapist or Chiropractor: none    Please be aware that coverage of these services is subject to the terms and limitations of your health insurance plan.  Call member services at your health plan with any benefit or coverage questions.      Please bring the following to your appointment:    *Your personal calendar for scheduling future appointments  *Comfortable clothing                  Who to contact     If you have questions or need follow up information about today's clinic visit or your schedule please contact [facility] directly at [phone].  Normal or non-critical lab and imaging results will be communicated to you by Crowdzuhart, letter or phone within 4 business days after the clinic has received the results. If you do not hear from us within 7 days, please contact the clinic through Crowdzuhart or phone. If you have a critical or abnormal lab result, we will notify you by phone as soon as possible.  Submit refill requests through Addashop or call your pharmacy and they will forward the refill request to us. Please allow 3 business days for your refill to be completed.          Additional Information About Your Visit        CrowdzuharUnidym Information     Addashop lets you send messages to your doctor, view your test results, renew your prescriptions, schedule appointments and more. To sign up, go to [email or web address] . Click on \"Log in\" on the left side of the screen, which will take you to the Welcome page. Then click on \"Sign up Now\" on the right side of the page.     You will be asked to enter the access code listed below, as well as some personal information. Please follow the directions to create your username and password.     Your access code is: [email or web address]  Expires: 5/28/2018  5:40 " "PM     Your access code will  in 90 days. If you need help or a new code, please call your [facility] clinic or [phone].        Care EveryWhere ID     This is your Care EveryWhere ID. This could be used by other organizations to access your [facility] medical records  [id number]        Your Vitals Were     Height                   6' 0.36\" (1.838 m)            Blood Pressure from Last 3 Encounters:   18 130/82   17 131/80   06/15/17 116/70    Weight from Last 3 Encounters:   18 291 lb (132 kg)   06/15/17 287 lb 12 oz (130.5 kg)   16 (!) 302 lb (137 kg)              We Performed the Following     [facility] PT, HAND, AND CHIROPRACTIC REFERRAL        Primary Care Provider Office Phone # Fax #    [facility] [phone]       [address] [address]        Equal Access to Services     ELIZABETH CARRASCO : Hadii aad ku hadasho Soomaali, waaxda luqadaha, qaybta kaalmada adeegyada, waxay irmain hayfern hilda delgadillo . So [facility] [phone].    ATENCIÓN: Si jonesla español, tiene a kaur disposición servicios gratuitos de asistencia lingüística. Llame al [phone].    We comply with applicable federal civil rights laws and [place] laws. We do not discriminate on the basis of race, color, national origin, age, disability, sex, sexual orientation, or gender identity.            Thank you!     Thank you for choosing [facility]  for your care. Our goal is always to provide you with excellent care. Hearing back from our patients is one way we can continue to improve our services. Please take a few minutes to complete the written survey that you may receive in the mail after your visit with us. Thank you!             Your Updated Medication List - Protect others around you: Learn how to safely use, store and throw away your medicines at www.disposemymeds.org.          This list is accurate as of 3/13/18  1:40 PM.  Always use your most recent med " "list.                   Brand Name Dispense Instructions for use Diagnosis    albuterol 108 (90 BASE) MCG/ACT Inhaler    PROAIR HFA/PROVENTIL HFA/VENTOLIN HFA     Inhale 2 puffs into the lungs        doxycycline 100 MG capsule    VIBRAMYCIN     Take 100 mg by mouth        ibuprofen 800 MG tablet    ADVIL/MOTRIN    100 tablet    TAKE ONE TABLET BY MOUTH THREE TIMES A DAY    Chondromalacia patellae of left knee       omeprazole 40 MG capsule    priLOSEC    90 capsule    TAKE 1 CAPSULE BY MOUTH DAILY    Harmon's esophagus with esophagitis, Gastroesophageal reflux disease without esophagitis       order for DME     1 Units    Equipment being ordered: \"cock-up splints for carpal tunnel\"  1 Pair for carpal tunnel.    CTS (carpal tunnel syndrome), unspecified laterality       predniSONE 10 MG tablet    DELTASONE     4 tabs po x 3 days, 3 tabs po x 3 days, 2 tab po x 3 days, 1 tab po x 3 days        sildenafil 100 MG tablet    VIAGRA    12 tablet    Take 0.5-1 tablets ( mg) by mouth daily as needed for erectile dysfunction Take 30 min to 4 hours before intercourse.  Never use with nitroglycerin, terazosin or doxazosin.    ED (erectile dysfunction)         "

## 2018-03-13 NOTE — PATIENT INSTRUCTIONS
-Physical therapy ordered at the [facility] [place].  [address], [place] [address].  Please do 5-6 days of exercises per week between formal sessions and the home exercises they provide.  -Brace as needed for comfort/support.  -Continue weight loss efforts.  Could also consider referral to nutritionist.  -Recommend low impact cardiovascular activity (-i.e. walking, elliptical, stationary bike, swimming, water aerobics)  -Ice or heat 15-20 minutes as needed (Avoid sleeping on a heating pad or ice)  -Patient's preferred over the counter medications as directed on packaging as needed for pain or soreness.  Please take ibuprofen with food. Do not premedicate prior to activity.  -Could also consider steroid injection but would want to wait until you have completed your antibiotics/oral steroids and feeling better.  You can call to schedule an injection with me at that time.      -Follow up in 6-8 weeks or sooner if symptoms fail to improve or worsen.  Please call with questions or concerns.

## 2018-04-30 ENCOUNTER — OFFICE VISIT (OUTPATIENT)
Dept: ORTHOPEDICS | Facility: OTHER | Age: 53
End: 2018-04-30
Payer: COMMERCIAL

## 2018-04-30 VITALS
DIASTOLIC BLOOD PRESSURE: 96 MMHG | BODY MASS INDEX: 40.09 KG/M2 | SYSTOLIC BLOOD PRESSURE: 120 MMHG | HEIGHT: 72 IN | WEIGHT: 296 LBS

## 2018-04-30 DIAGNOSIS — M22.2X2 PATELLOFEMORAL PAIN SYNDROME OF LEFT KNEE: ICD-10-CM

## 2018-04-30 DIAGNOSIS — G89.29 CHRONIC PAIN OF LEFT KNEE: Primary | ICD-10-CM

## 2018-04-30 DIAGNOSIS — M25.562 CHRONIC PAIN OF LEFT KNEE: Primary | ICD-10-CM

## 2018-04-30 PROCEDURE — 20610 DRAIN/INJ JOINT/BURSA W/O US: CPT | Mod: LT | Performed by: PHYSICAL MEDICINE & REHABILITATION

## 2018-04-30 RX ORDER — TRIAMCINOLONE ACETONIDE 40 MG/ML
40 INJECTION, SUSPENSION INTRA-ARTICULAR; INTRAMUSCULAR ONCE
Qty: 1 ML | Refills: 0 | OUTPATIENT
Start: 2018-04-30 | End: 2018-04-30

## 2018-04-30 NOTE — MR AVS SNAPSHOT
After Visit Summary   4/30/2018    Amol Peres    MRN: [id number]           Patient Information     Date Of Birth          1965        Visit Information        Provider Department      4/30/2018 4:40 PM Daiana Hill MD [facility]        Today's Diagnoses     Chronic pain of left knee    -  1    Patellofemoral pain syndrome of left knee          Care Instructions    -Steroid injection performed today.  Take it easy over the next few days. Keep in mind that the steroid may take up to 3 days to start working and up to 2 weeks to reach maximal effect.  Ice 15-20 minutes as needed for soreness.  Patient's preferred over the counter medication as needed for pain as directed on packaging.  -Physical therapy ordered at the [facility] [place].  [address], [place] [address].  Please do 5-6 days of exercises per week between formal sessions and the home exercises they provide.  -Brace as needed for comfort/support.  -Continue weight loss efforts.  Could also consider referral to nutritionist.  -Recommend low impact cardiovascular activity (-i.e. walking, elliptical, stationary bike, swimming, water aerobics)  -Ice or heat 15-20 minutes as needed (Avoid sleeping on a heating pad or ice)  -Patient's preferred over the counter medications as directed on packaging as needed for pain or soreness.  Please take ibuprofen with food. Do not premedicate prior to activity.    -Follow up in 6-8 weeks or sooner if symptoms fail to improve or worsen.  Please call with questions or concerns.              Follow-ups after your visit        Your next 10 appointments already scheduled     Apr 30, 2018  4:40 PM CDT   Return Visit with Daiana Hill MD   [facility] ([facility]    [address]  [place] [id number] [phone]              Who to contact     If you have questions or need follow up  "information about today's clinic visit or your schedule please contact [facility] [place] directly at [phone].  Normal or non-critical lab and imaging results will be communicated to you by MyChart, letter or phone within 4 business days after the clinic has received the results. If you do not hear from us within 7 days, please contact the clinic through MyChart or phone. If you have a critical or abnormal lab result, we will notify you by phone as soon as possible.  Submit refill requests through Guestmob or call your pharmacy and they will forward the refill request to us. Please allow 3 business days for your refill to be completed.          Additional Information About Your Visit        Sales[employer] Information     Guestmob lets you send messages to your doctor, view your test results, renew your prescriptions, schedule appointments and more. To sign up, go to [email or web address] . Click on \"Log in\" on the left side of the screen, which will take you to the Welcome page. Then click on \"Sign up Now\" on the right side of the page.     You will be asked to enter the access code listed below, as well as some personal information. Please follow the directions to create your username and password.     Your access code is: [email or web address]  Expires: 2018  5:40 PM     Your access code will  in 90 days. If you need help or a new code, please call your [place] clinic or [phone].        Care EveryWhere ID     This is your Care EveryWhere ID. This could be used by other organizations to access your [place] medical records  [id number]        Your Vitals Were     Height BMI (Body Mass Index)                6' 0.36\" (1.838 m) 39.75 kg/m2           Blood Pressure from Last 3 Encounters:   18 (!) 120/96   18 130/82   17 131/80    Weight from Last 3 Encounters:   18 296 lb (134.3 kg)   18 291 lb (132 kg)   06/15/17 287 lb 12 oz (130.5 kg)              Today, you had the " "following     No orders found for display       Primary Care Provider Office Phone # Fax #    [facility] [phone]       [address] [facility] [address]        Equal Access to Services     ELIZABETH CARRASCO : Hadii aad ku hadjdo Sooliverali, waaxda luqadaha, qaybta kaalmada adeflor, lani gilfabby stevens veena leona hayden. So [facility] [phone].    ATENCIÓN: Si habla español, tiene a kaur disposición servicios gratuitos de asistencia lingüística. [facility] [phone].    We comply with applicable federal civil rights laws and [place] laws. We do not discriminate on the basis of race, color, national origin, age, disability, sex, sexual orientation, or gender identity.            Thank you!     Thank you for choosing [facility]  for your care. Our goal is always to provide you with excellent care. Hearing back from our patients is one way we can continue to improve our services. Please take a few minutes to complete the written survey that you may receive in the mail after your visit with us. Thank you!             Your Updated Medication List - Protect others around you: Learn how to safely use, store and throw away your medicines at www.disposemymeds.org.          This list is accurate as of 4/30/18  4:21 PM.  Always use your most recent med list.                   Brand Name Dispense Instructions for use Diagnosis    albuterol 108 (90 Base) MCG/ACT Inhaler    PROAIR HFA/PROVENTIL HFA/VENTOLIN HFA     Inhale 2 puffs into the lungs        ibuprofen 800 MG tablet    ADVIL/MOTRIN    100 tablet    TAKE ONE TABLET BY MOUTH THREE TIMES A DAY    Chondromalacia patellae of left knee       omeprazole 40 MG capsule    priLOSEC    90 capsule    TAKE 1 CAPSULE BY MOUTH DAILY    Harmon's esophagus with esophagitis, Gastroesophageal reflux disease without esophagitis       order for DME     1 Units    Equipment being ordered: \"cock-up splints for carpal tunnel\"  1 Pair for carpal " tunnel.    CTS (carpal tunnel syndrome), unspecified laterality       order for DME     1 Device    Equipment being ordered: patellar stabilizing knee brace with horseshoe, 2X    Chronic pain of left knee, Patellofemoral pain syndrome of left knee       sildenafil 100 MG tablet    VIAGRA    12 tablet    Take 0.5-1 tablets ( mg) by mouth daily as needed for erectile dysfunction Take 30 min to 4 hours before intercourse.  Never use with nitroglycerin, terazosin or doxazosin.    ED (erectile dysfunction)

## 2018-04-30 NOTE — LETTER
4/30/2018         RE: Amol Peres  [address]  [place] [address]        Dear Colleague,    Thank you for referring your patient, Amol Peres, to the [facility]. Please see a copy of my visit note below.    Sports Medicine Clinic Visit - Interim History April 30, 2018    Initial Visit Date 03/13/2018    PCP: Abel, [facility]    Amol Peres is a 52 year old male who is seen in follow up for left knee pain.  Since last visit on 3/13/2018 patient has continued to have soreness in the knee. He notes that he continues to have pain in the lateral lower leg as well.  He rates the pain at a  8/10 currently.  Symptoms are relieved with prednisone and ice.  Symptoms are worsened by turning with planting, activity. He notes difficulty lifting his leg into his truck and pressing on the clutch on his knee. He is hoping to try a knee injection today.         Review of Systems  Musculoskeletal: as above  Remainder of review of systems is negative including constitutional, eyes, ENT, CV, pulmonary, GI, , endocrine, skin, hematologic, and neurologic except as noted in HPI or medical history.    History reviewed. No pertinent past surgical/medical/family/social history other than as mentioned in HPI.    Past Medical History:   Diagnosis Date     Chondromalacia patellae of left knee 11/28/2014     CTS (carpal tunnel syndrome) 11/28/2014    right > left      GERD (gastroesophageal reflux disease)      Hiatal hernia 2008     Hypertension 3/26/2010     Mild airflow obstruction on pulmonary function test 4/6/2016     Morbid obesity with body mass index of 40.0-44.9 in adult (H) 3/16/2016     Tobacco use disorder 3/16/2016     Past Surgical History:   Procedure Laterality Date     APPENDECTOMY  04/04/10     COLONOSCOPY N/A 10/16/2015    Procedure: COMBINED COLONOSCOPY, SINGLE OR MULTIPLE BIOPSY/POLYPECTOMY BY BIOPSY;  Surgeon: Marcial Colvin MD;  Location:  GI     ESOPHAGOSCOPY,  GASTROSCOPY, DUODENOSCOPY (EGD), COMBINED  12/27/2010    COMBINED ESOPHAGOSCOPY, GASTROSCOPY, DUODENOSCOPY (EGD), BIOPSY SINGLE OR MULTIPLE performed by JUSTINA CAR at  GI     ESOPHAGOSCOPY, GASTROSCOPY, DUODENOSCOPY (EGD), COMBINED  12/26/2012    Procedure: COMBINED ESOPHAGOSCOPY, GASTROSCOPY, DUODENOSCOPY (EGD), BIOPSY SINGLE OR MULTIPLE;  ESOPHAGOSCOPY, GASTROSCOPY, DUODENOSCOPY WITH  SINGLE BIOPSY;  Surgeon: Justina Car MD;  Location:  GI     ESOPHAGOSCOPY, GASTROSCOPY, DUODENOSCOPY (EGD), COMBINED N/A 10/16/2015    Procedure: COMBINED ESOPHAGOSCOPY, GASTROSCOPY, DUODENOSCOPY (EGD), BIOPSY SINGLE OR MULTIPLE;  Surgeon: Marcial Colvin MD;  Location:  GI     ESOPHAGOSCOPY, GASTROSCOPY, DUODENOSCOPY (EGD), COMBINED N/A 9/1/2017    Procedure: COMBINED ESOPHAGOSCOPY, GASTROSCOPY, DUODENOSCOPY (EGD), BIOPSY SINGLE OR MULTIPLE;  Esophagogastroduodenoscopy, Biopsy by Biopsy;  Surgeon: Kevin Fung MD;  Location:  GI     HC UGI ENDOSCOPY DIAG W BIOPSY  8/28/2009     HC UGI ENDOSCOPY, SIMPLE EXAM  09/23/08     Family History   Problem Relation Age of Onset     CANCER Mother      Cervical     CANCER Maternal Grandfather      CANCER Paternal Grandmother      Social History     Social History     Marital status:      Spouse name: N/A     Number of children: N/A     Years of education: N/A     Occupational History     Not on file.     Social History Main Topics     Smoking status: Former Smoker     Packs/day: 1.00     Quit date: 3/19/2018     Smokeless tobacco: Never Used     Alcohol use Yes      Comment: occasionally     Drug use: No     Sexual activity: Yes     Partners: Female     Other Topics Concern      Service No     Blood Transfusions No     Caffeine Concern No     Occupational Exposure No     Hobby Hazards No     Sleep Concern No     Stress Concern Yes     Weight Concern Yes     Special Diet No     Back Care No     Exercise No     Bike Helmet No     Seat Belt Yes  "    Self-Exams No     Parent/Sibling W/ Cabg, Mi Or Angioplasty Before 65f 55m? No     Social History Narrative       He works as a     Current Outpatient Prescriptions   Medication     albuterol (PROAIR HFA/PROVENTIL HFA/VENTOLIN HFA) 108 (90 BASE) MCG/ACT Inhaler     ibuprofen (ADVIL/MOTRIN) 800 MG tablet     omeprazole (PRILOSEC) 40 MG capsule     ORDER FOR DME     order for DME     sildenafil (VIAGRA) 100 MG tablet     No current facility-administered medications for this visit.      Allergies   Allergen Reactions     No Known Drug Allergies          Objective:  BP (!) 120/96  Ht 6' 0.36\" (1.838 m)  Wt 296 lb (134.3 kg)  BMI 39.75 kg/m2    General: Alert and in no distress    Head: Normocephalic, atraumatic  Eyes: no scleral icterus or conjunctival erythema   Oropharynx:  Mucous membranes moist  Skin: no erythema, petechiae, or jaundice  CV: regular rhythm by palpation, 2+ distal pulses  Resp: normal respiratory effort without conversational dyspnea   Psych: normal mood and affect    Gait: Non-antalgic, appropriate coordination and balance   Neuro: Motor strength and sensation as noted below    Musculoskeletal:    Bilateral Knee exam    Inspection:  No erythema or ecchymosis.  Mild left superficial knee swelling.    Palpation: Tender over the left medial patellar region.      Knee ROM: Full active knee extension without pain.      Patellar Motion:      Normal patellar tracking noted through range of motion    Strength:  5/5 Hip flexion/abduction/adduction, knee extension/flexion, ankle plantarflexion/dorsiflexion\    Sensation:  Intact to light touch in the bilateral lower extremities      Radiology:  No new imaging    Procedure:  Discussed steroid injection, including risks, potential benefits, and alternatives.  The patient expressed understanding.  Obtained verbal and written consent and the patient elected to proceed.  Procedure: A steroid injection was performed under aseptic technique at the " left knee joint using 2 mL of 1% plain Lidocaine, 2 mL of 0.5% Marcaine, and 1 mL of 40 mg/mL Kenalog.  Bandage applied. This was well tolerated.      Assessment:  1. Chronic pain of left knee    2. Patellofemoral pain syndrome of left knee        Plan:  Discussed the assessment with the patient and developed a plan together:  -Steroid injection performed today.  Take it easy over the next few days. Keep in mind that the steroid may take up to 3 days to start working and up to 2 weeks to reach maximal effect.  Ice 15-20 minutes as needed for soreness.  Patient's preferred over the counter medication as needed for pain as directed on packaging.  -Physical therapy ordered at the [facility] [place].  [address], [place] [address].  Please do 5-6 days of exercises per week between formal sessions and the home exercises they provide.  -Brace as needed for comfort/support.  -Continue weight loss efforts.  Could also consider referral to nutritionist.  -Recommend low impact cardiovascular activity (-i.e. walking, elliptical, stationary bike, swimming, water aerobics)  -Ice or heat 15-20 minutes as needed (Avoid sleeping on a heating pad or ice)  -Patient's preferred over the counter medications as directed on packaging as needed for pain or soreness.  Please take ibuprofen with food. Do not premedicate prior to activity.    -Patient to follow up with his primary care provider regarding elevated blood pressure.    -Follow up in 6-8 weeks or sooner if symptoms fail to improve or worsen.  Please call with questions or concerns.        Namita Hill MD, [facility]        Again, thank you for allowing me to participate in the care of your patient.        Sincerely,        Daiana Hill MD

## 2018-04-30 NOTE — PROGRESS NOTES
Sports Medicine Clinic Visit - Interim History April 30, 2018    Initial Visit Date 03/13/2018    PCP: Abel, [facility]    Amol Peres is a 52 year old male who is seen in follow up for left knee pain.  Since last visit on 3/13/2018 patient has continued to have soreness in the knee. He notes that he continues to have pain in the lateral lower leg as well.  He rates the pain at a  8/10 currently.  Symptoms are relieved with prednisone and ice.  Symptoms are worsened by turning with planting, activity. He notes difficulty lifting his leg into his truck and pressing on the clutch on his knee. He is hoping to try a knee injection today.         Review of Systems  Musculoskeletal: as above  Remainder of review of systems is negative including constitutional, eyes, ENT, CV, pulmonary, GI, , endocrine, skin, hematologic, and neurologic except as noted in HPI or medical history.    History reviewed. No pertinent past surgical/medical/family/social history other than as mentioned in HPI.    Past Medical History:   Diagnosis Date     Chondromalacia patellae of left knee 11/28/2014     CTS (carpal tunnel syndrome) 11/28/2014    right > left      GERD (gastroesophageal reflux disease)      Hiatal hernia 2008     Hypertension 3/26/2010     Mild airflow obstruction on pulmonary function test 4/6/2016     Morbid obesity with body mass index of 40.0-44.9 in adult (H) 3/16/2016     Tobacco use disorder 3/16/2016     Past Surgical History:   Procedure Laterality Date     APPENDECTOMY  04/04/10     COLONOSCOPY N/A 10/16/2015    Procedure: COMBINED COLONOSCOPY, SINGLE OR MULTIPLE BIOPSY/POLYPECTOMY BY BIOPSY;  Surgeon: Marcial Colvin MD;  Location:  GI     ESOPHAGOSCOPY, GASTROSCOPY, DUODENOSCOPY (EGD), COMBINED  12/27/2010    COMBINED ESOPHAGOSCOPY, GASTROSCOPY, DUODENOSCOPY (EGD), BIOPSY SINGLE OR MULTIPLE performed by JUSTINA MORGAN at  GI     ESOPHAGOSCOPY, GASTROSCOPY, DUODENOSCOPY (EGD), COMBINED   12/26/2012    Procedure: COMBINED ESOPHAGOSCOPY, GASTROSCOPY, DUODENOSCOPY (EGD), BIOPSY SINGLE OR MULTIPLE;  ESOPHAGOSCOPY, GASTROSCOPY, DUODENOSCOPY WITH  SINGLE BIOPSY;  Surgeon: Patrice Car MD;  Location: [facility]     ESOPHAGOSCOPY, GASTROSCOPY, DUODENOSCOPY (EGD), COMBINED N/A 10/16/2015    Procedure: COMBINED ESOPHAGOSCOPY, GASTROSCOPY, DUODENOSCOPY (EGD), BIOPSY SINGLE OR MULTIPLE;  Surgeon: Marcial Colvin MD;  Location: [facility]     ESOPHAGOSCOPY, GASTROSCOPY, DUODENOSCOPY (EGD), COMBINED N/A 9/1/2017    Procedure: COMBINED ESOPHAGOSCOPY, GASTROSCOPY, DUODENOSCOPY (EGD), BIOPSY SINGLE OR MULTIPLE;  Esophagogastroduodenoscopy, Biopsy by Biopsy;  Surgeon: Kevin Fung MD;  Location: [facility]     HC UGI ENDOSCOPY DIAG W BIOPSY  8/28/2009     HC UGI ENDOSCOPY, SIMPLE EXAM  09/23/08     Family History   Problem Relation Age of Onset     CANCER Mother      Cervical     CANCER Maternal Grandfather      CANCER Paternal Grandmother      Social History     Social History     Marital status:      Spouse name: N/A     Number of children: N/A     Years of education: N/A     Occupational History     Not on file.     Social History Main Topics     Smoking status: Former Smoker     Packs/day: 1.00     Quit date: 3/19/2018     Smokeless tobacco: Never Used     Alcohol use Yes      Comment: occasionally     Drug use: No     Sexual activity: Yes     Partners: Female     Other Topics Concern      Service No     Blood Transfusions No     Caffeine Concern No     Occupational Exposure No     Hobby Hazards No     Sleep Concern No     Stress Concern Yes     Weight Concern Yes     Special Diet No     Back Care No     Exercise No     Bike Helmet No     Seat Belt Yes     Self-Exams No     Parent/Sibling W/ Cabg, Mi Or Angioplasty Before 65f 55m? No     Social History Narrative       He works as a     Current Outpatient Prescriptions   Medication     albuterol (PROAIR HFA/PROVENTIL HFA/VENTOLIN  "HFA) 108 (90 BASE) MCG/ACT Inhaler     ibuprofen (ADVIL/MOTRIN) 800 MG tablet     omeprazole (PRILOSEC) 40 MG capsule     ORDER FOR DME     order for DME     sildenafil (VIAGRA) 100 MG tablet     No current facility-administered medications for this visit.      Allergies   Allergen Reactions     No Known Drug Allergies          Objective:  BP (!) 120/96  Ht 6' 0.36\" (1.838 m)  Wt 296 lb (134.3 kg)  BMI 39.75 kg/m2    General: Alert and in no distress    Head: Normocephalic, atraumatic  Eyes: no scleral icterus or conjunctival erythema   Oropharynx:  Mucous membranes moist  Skin: no erythema, petechiae, or jaundice  CV: regular rhythm by palpation, 2+ distal pulses  Resp: normal respiratory effort without conversational dyspnea   Psych: normal mood and affect    Gait: Antalgic  Neuro: Motor strength and sensation as noted below    Musculoskeletal:    Bilateral Knee exam    Inspection:  No erythema or ecchymosis.  Mild left superficial knee swelling.    Palpation: Tender over the left medial patellar region.      Knee ROM: Full active knee extension without pain.      Patellar Motion:      Normal patellar tracking noted through range of motion    Strength:  5/5 Hip flexion/abduction/adduction, knee extension/flexion, ankle plantarflexion/dorsiflexion\    Sensation:  Intact to light touch in the bilateral lower extremities      Radiology:  No new imaging    Procedure:  Discussed steroid injection, including risks, potential benefits, and alternatives.  The patient expressed understanding.  Obtained verbal and written consent and the patient elected to proceed.  Procedure: A steroid injection was performed under aseptic technique at the left knee joint using 2 mL of 1% plain Lidocaine, 2 mL of 0.5% Marcaine, and 1 mL of 40 mg/mL Kenalog.  Bandage applied. This was well tolerated.      Assessment:  1. Chronic pain of left knee    2. Patellofemoral pain syndrome of left knee        Plan:  Discussed the assessment with " the patient and developed a plan together:  -Steroid injection performed today.  Take it easy over the next few days. Keep in mind that the steroid may take up to 3 days to start working and up to 2 weeks to reach maximal effect.  Ice 15-20 minutes as needed for soreness.  Patient's preferred over the counter medication as needed for pain as directed on packaging.  -Physical therapy ordered at the [facility] [place].  [address], [place] [address].  Please do 5-6 days of exercises per week between formal sessions and the home exercises they provide.  -Brace as needed for comfort/support.  -Continue weight loss efforts.  Could also consider referral to nutritionist.  -Recommend low impact cardiovascular activity (-i.e. walking, elliptical, stationary bike, swimming, water aerobics)  -Ice or heat 15-20 minutes as needed (Avoid sleeping on a heating pad or ice)  -Patient's preferred over the counter medications as directed on packaging as needed for pain or soreness.  Please take ibuprofen with food. Do not premedicate prior to activity.    -Patient to follow up with his primary care provider regarding elevated blood pressure.    -Follow up in 6-8 weeks or sooner if symptoms fail to improve or worsen.  Please call with questions or concerns.        Namita Hill MD, CAQ  [facility]

## 2018-04-30 NOTE — PATIENT INSTRUCTIONS
-Steroid injection performed today.  Take it easy over the next few days. Keep in mind that the steroid may take up to 3 days to start working and up to 2 weeks to reach maximal effect.  Ice 15-20 minutes as needed for soreness.  Patient's preferred over the counter medication as needed for pain as directed on packaging.  -Physical therapy ordered at the [facility] [place].  [address], [place] [address].  Please do 5-6 days of exercises per week between formal sessions and the home exercises they provide.  -Brace as needed for comfort/support.  -Continue weight loss efforts.  Could also consider referral to nutritionist.  -Recommend low impact cardiovascular activity (-i.e. walking, elliptical, stationary bike, swimming, water aerobics)  -Ice or heat 15-20 minutes as needed (Avoid sleeping on a heating pad or ice)  -Patient's preferred over the counter medications as directed on packaging as needed for pain or soreness.  Please take ibuprofen with food. Do not premedicate prior to activity.      -Follow up in 6-8 weeks or sooner if symptoms fail to improve or worsen.  Please call with questions or concerns.

## 2018-09-09 DIAGNOSIS — K22.70 BARRETT'S ESOPHAGUS WITH ESOPHAGITIS: ICD-10-CM

## 2018-09-09 DIAGNOSIS — K20.90 BARRETT'S ESOPHAGUS WITH ESOPHAGITIS: ICD-10-CM

## 2018-09-09 DIAGNOSIS — K21.9 GASTROESOPHAGEAL REFLUX DISEASE WITHOUT ESOPHAGITIS: ICD-10-CM

## 2018-09-10 RX ORDER — OMEPRAZOLE 40 MG/1
CAPSULE, DELAYED RELEASE ORAL
Qty: 30 CAPSULE | Refills: 0 | Status: SHIPPED | OUTPATIENT
Start: 2018-09-10 | End: 2018-10-09

## 2018-09-10 NOTE — TELEPHONE ENCOUNTER
"Requested Prescriptions   Pending Prescriptions Disp Refills     omeprazole (PRILOSEC) 40 MG capsule [Pharmacy Med Name: OMEPRAZOLE 40MG CPDR] 90 capsule 1     Sig: TAKE ONE CAPSULE BY MOUTH ONCE DAILY    PPI Protocol Passed    9/9/2018  3:21 PM       Passed - Not on Clopidogrel (unless Pantoprazole ordered)       Passed - No diagnosis of osteoporosis on record       Passed - Recent (12 mo) or future (30 days) visit within the authorizing provider's specialty    Patient had office visit in the last 12 months or has a visit in the next 30 days with authorizing provider or within the authorizing provider's specialty.  See \"Patient Info\" tab in inbasket, or \"Choose Columns\" in Meds & Orders section of the refill encounter.           Passed - Patient is age 18 or older        omeprazole (PRILOSEC) 40 MG capsule  Medication is being filled for 1 time refill only due to:  Patient needs to be seen because due for  6month OV .     Please assist with scheduling.    Lorene Dow RN, BSN         "

## 2018-10-09 ENCOUNTER — TELEPHONE (OUTPATIENT)
Dept: FAMILY MEDICINE | Facility: OTHER | Age: 53
End: 2018-10-09

## 2018-10-09 DIAGNOSIS — K20.90 BARRETT'S ESOPHAGUS WITH ESOPHAGITIS: ICD-10-CM

## 2018-10-09 DIAGNOSIS — K22.70 BARRETT'S ESOPHAGUS WITH ESOPHAGITIS: ICD-10-CM

## 2018-10-09 DIAGNOSIS — K21.9 GASTROESOPHAGEAL REFLUX DISEASE WITHOUT ESOPHAGITIS: ICD-10-CM

## 2018-10-09 RX ORDER — OMEPRAZOLE 40 MG/1
CAPSULE, DELAYED RELEASE ORAL
Qty: 90 CAPSULE | Refills: 0 | Status: SHIPPED | OUTPATIENT
Start: 2018-10-09 | End: 2018-12-31

## 2018-10-09 NOTE — TELEPHONE ENCOUNTER
Reason for call:  Medication  If this is a refill request, has the caller requested the refill from the pharmacy already? No  Will the patient be using a [facility]? No  Name of the pharmacy and phone number for the current request: [facility]    Name of the medication requested: Prilosec    Other request: Pt states his last refill was only for 30 days, supposed to be for 90 days. Previous phone encounter states pt is due for 6 month follow up. Pt also states it is difficult for him to take work off, he would like to have a refill to get him through til his next appointment that he will make for between Christmas and New Year's.    Phone number to reach patient:  Home number on file [phone] (home)    Best Time:  Up to 7:30pm    Can we leave a detailed message on this number?  YES

## 2018-12-26 NOTE — PROGRESS NOTES
SUBJECTIVE:   Amol Peres is a 53 year old male who presents to clinic today for the following health issues:    History of Present Illness     Hypertension:     Outpatient blood pressures:  Are not being checked    Dietary sodium intake::  No added salt diet    Diet:  Other  Frequency of exercise:  None  Taking medications regularly:  Yes  Medication side effects:  None  Additional concerns today:  No    Problem list and histories reviewed & adjusted, as indicated.  Additional history: as documented    Patient Active Problem List   Diagnosis     Gastroesophageal reflux disease with esophagitis     Harmon's esophagus with esophagitis     Hyperlipidemia LDL goal <130     Hypertension goal BP (blood pressure) < 140/90     Pain in thoracic spine     CTS (carpal tunnel syndrome)     Chondromalacia patellae of left knee     Morbid obesity with body mass index of 40.0-44.9 in adult (H)     Tobacco use disorder     Mild airflow obstruction on pulmonary function test     Lipoma of skin and subcutaneous tissue     Past Surgical History:   Procedure Laterality Date     APPENDECTOMY  04/04/10     COLONOSCOPY N/A 10/16/2015    Procedure: COMBINED COLONOSCOPY, SINGLE OR MULTIPLE BIOPSY/POLYPECTOMY BY BIOPSY;  Surgeon: Marcial Colvin MD;  Location:  GI     ESOPHAGOSCOPY, GASTROSCOPY, DUODENOSCOPY (EGD), COMBINED  12/27/2010    COMBINED ESOPHAGOSCOPY, GASTROSCOPY, DUODENOSCOPY (EGD), BIOPSY SINGLE OR MULTIPLE performed by JUSTINA MORGAN at  GI     ESOPHAGOSCOPY, GASTROSCOPY, DUODENOSCOPY (EGD), COMBINED  12/26/2012    Procedure: COMBINED ESOPHAGOSCOPY, GASTROSCOPY, DUODENOSCOPY (EGD), BIOPSY SINGLE OR MULTIPLE;  ESOPHAGOSCOPY, GASTROSCOPY, DUODENOSCOPY WITH  SINGLE BIOPSY;  Surgeon: Justina Morgan MD;  Location:  GI     ESOPHAGOSCOPY, GASTROSCOPY, DUODENOSCOPY (EGD), COMBINED N/A 10/16/2015    Procedure: COMBINED ESOPHAGOSCOPY, GASTROSCOPY, DUODENOSCOPY (EGD), BIOPSY SINGLE OR MULTIPLE;  Surgeon:  Marcial Colvin MD;  Location: [facility]     ESOPHAGOSCOPY, GASTROSCOPY, DUODENOSCOPY (EGD), COMBINED N/A 2017    Procedure: COMBINED ESOPHAGOSCOPY, GASTROSCOPY, DUODENOSCOPY (EGD), BIOPSY SINGLE OR MULTIPLE;  Esophagogastroduodenoscopy, Biopsy by Biopsy;  Surgeon: Kevin Fung MD;  Location: [facility]     [facility] UGI ENDOSCOPY DIAG W BIOPSY  2009     HC UGI ENDOSCOPY, SIMPLE EXAM  08       Social History     Tobacco Use     Smoking status: Former Smoker     Packs/day: 1.00     Last attempt to quit: 3/19/2018     Years since quittin.7     Smokeless tobacco: Never Used   Substance Use Topics     Alcohol use: Yes     Comment: occasionally     Family History   Problem Relation Age of Onset     Cancer Mother         Cervical     Cancer Maternal Grandfather      Cancer Paternal Grandmother          Current Outpatient Medications   Medication Sig Dispense Refill     albuterol (PROAIR HFA/PROVENTIL HFA/VENTOLIN HFA) 108 (90 BASE) MCG/ACT Inhaler Inhale 2 puffs into the lungs       ibuprofen (ADVIL/MOTRIN) 800 MG tablet TAKE ONE TABLET BY MOUTH THREE TIMES A  tablet 3     omeprazole (PRILOSEC) 40 MG DR capsule TAKE ONE CAPSULE BY MOUTH ONCE DAILY 90 capsule 0     sildenafil (VIAGRA) 100 MG tablet Take 0.5-1 tablets ( mg) by mouth daily as needed for erectile dysfunction Take 30 min to 4 hours before intercourse.  Never use with nitroglycerin, terazosin or doxazosin. (Patient not taking: Reported on 3/13/2018) 12 tablet 0     Allergies   Allergen Reactions     No Known Drug Allergies      Recent Labs   Lab Test 16  1012 14  0817 14  1148 11  1104   A1C  --   --   --  5.4   LDL 77 112 132* 115   HDL 24* 37* 35* 34*   TRIG 354* 164* 203* 230*   ALT 48 40 62  --    CR 1.23 1.06 1.19  --    GFRESTIMATED 62 73 65  --    GFRESTBLACK 75 89 78  --    POTASSIUM 4.3 4.5 4.3  --    TSH 1.84  --  1.59  --       BP Readings from Last 3 Encounters:   18 128/86   18 (!)  120/96   02/27/18 130/82    Wt Readings from Last 3 Encounters:   12/31/18 142.1 kg (313 lb 3.2 oz)   04/30/18 134.3 kg (296 lb)   02/27/18 132 kg (291 lb)                  Labs reviewed in EPIC    ROS:  Constitutional, HEENT, cardiovascular, pulmonary, GI, , musculoskeletal, neuro, skin, endocrine and psych systems are negative, except as otherwise noted.    OBJECTIVE:     /86 (Cuff Size: Adult Large)   Pulse 96   Temp 98.6  F (37  C) (Temporal)   Wt 142.1 kg (313 lb 3.2 oz)   SpO2 94%   BMI 42.06 kg/m    Body mass index is 42.06 kg/m .  GENERAL: healthy, alert and no distress  NECK: no adenopathy, no asymmetry, masses, or scars and trachea midline and normal to palpation  RESP: lungs clear to auscultation - no rales, rhonchi or wheezes  CV: regular rate and rhythm, normal S1 S2, no S3 or S4, no murmur, click or rub, no peripheral edema and peripheral pulses strong  ABDOMEN: soft, nontender, no hepatosplenomegaly, no masses and bowel sounds normal  MS: no gross musculoskeletal defects noted, no edema  SKIN: Multiple lipomas are suspected to the scalp and to the right foot.  We discussed having surgeon take a look at this further.  NEURO: Normal strength and tone, mentation intact and speech normal  PSYCH: mentation appears normal, affect normal/bright    Diagnostic Test Results:  No results found for this or any previous visit (from the past 24 hour(s)).    ASSESSMENT/PLAN:     1. Morbid obesity with body mass index of 40.0-44.9 in adult (H)  Strongly advised weight loss once again.  He assures me that by this time 6 months from now he will be 50 pounds less.  I advised him that I wished him good luck.  We discussed diet and exercise techniques.  - Comprehensive metabolic panel  - Lipid panel reflex to direct LDL Fasting  - PSA, screen    2. Harmon's esophagus with esophagitis  - Comprehensive metabolic panel  - Lipid panel reflex to direct LDL Fasting  - PSA, screen  - omeprazole (PRILOSEC) 40 MG   capsule; TAKE ONE CAPSULE BY MOUTH ONCE DAILY  Dispense: 90 capsule; Refill: 0    3. Gastroesophageal reflux disease with esophagitis    4. Hypertension goal BP (blood pressure) < 140/90  - Comprehensive metabolic panel  - Lipid panel reflex to direct LDL Fasting  - PSA, screen    5. Lipoma of skin and subcutaneous tissue  - GENERAL SURG ADULT REFERRAL    6. Gastroesophageal reflux disease without esophagitis  - omeprazole (PRILOSEC) 40 MG DR capsule; TAKE ONE CAPSULE BY MOUTH ONCE DAILY  Dispense: 90 capsule; Refill: 0    7. Hyperlipidemia LDL goal <130    Follow-up in 3 months is advised for blood pressure related issues 6 months for overall weight and medication recheck.    Arnulfo Reno PA-C  [facility]

## 2018-12-31 ENCOUNTER — OFFICE VISIT (OUTPATIENT)
Dept: FAMILY MEDICINE | Facility: OTHER | Age: 53
End: 2018-12-31
Payer: COMMERCIAL

## 2018-12-31 ENCOUNTER — TELEPHONE (OUTPATIENT)
Dept: FAMILY MEDICINE | Facility: OTHER | Age: 53
End: 2018-12-31

## 2018-12-31 VITALS
DIASTOLIC BLOOD PRESSURE: 86 MMHG | WEIGHT: 313.2 LBS | HEART RATE: 96 BPM | TEMPERATURE: 98.6 F | SYSTOLIC BLOOD PRESSURE: 128 MMHG | OXYGEN SATURATION: 94 % | BODY MASS INDEX: 42.06 KG/M2

## 2018-12-31 DIAGNOSIS — E66.01 MORBID OBESITY WITH BODY MASS INDEX OF 40.0-44.9 IN ADULT (H): ICD-10-CM

## 2018-12-31 DIAGNOSIS — D17.30 LIPOMA OF SKIN AND SUBCUTANEOUS TISSUE: ICD-10-CM

## 2018-12-31 DIAGNOSIS — K20.90 BARRETT'S ESOPHAGUS WITH ESOPHAGITIS: Primary | ICD-10-CM

## 2018-12-31 DIAGNOSIS — I10 HYPERTENSION GOAL BP (BLOOD PRESSURE) < 140/90: ICD-10-CM

## 2018-12-31 DIAGNOSIS — K21.00 GASTROESOPHAGEAL REFLUX DISEASE WITH ESOPHAGITIS: ICD-10-CM

## 2018-12-31 DIAGNOSIS — K21.9 GASTROESOPHAGEAL REFLUX DISEASE WITHOUT ESOPHAGITIS: ICD-10-CM

## 2018-12-31 DIAGNOSIS — K22.70 BARRETT'S ESOPHAGUS WITH ESOPHAGITIS: Primary | ICD-10-CM

## 2018-12-31 DIAGNOSIS — E78.5 HYPERLIPIDEMIA LDL GOAL <130: ICD-10-CM

## 2018-12-31 LAB
ALBUMIN SERPL-MCNC: 4.2 G/DL (ref 3.4–5)
ALP SERPL-CCNC: 62 U/L (ref 40–150)
ALT SERPL W P-5'-P-CCNC: 40 U/L (ref 0–70)
ANION GAP SERPL CALCULATED.3IONS-SCNC: 7 MMOL/L (ref 3–14)
AST SERPL W P-5'-P-CCNC: 23 U/L (ref 0–45)
BILIRUB SERPL-MCNC: 1.4 MG/DL (ref 0.2–1.3)
BUN SERPL-MCNC: 11 MG/DL (ref 7–30)
CALCIUM SERPL-MCNC: 8.8 MG/DL (ref 8.5–10.1)
CHLORIDE SERPL-SCNC: 105 MMOL/L (ref 94–109)
CHOLEST SERPL-MCNC: 217 MG/DL
CO2 SERPL-SCNC: 28 MMOL/L (ref 20–32)
CREAT SERPL-MCNC: 1.22 MG/DL (ref 0.66–1.25)
GFR SERPL CREATININE-BSD FRML MDRD: 66 ML/MIN/{1.73_M2}
GLUCOSE SERPL-MCNC: 82 MG/DL (ref 70–99)
HDLC SERPL-MCNC: 37 MG/DL
LDLC SERPL CALC-MCNC: 114 MG/DL
NONHDLC SERPL-MCNC: 180 MG/DL
POTASSIUM SERPL-SCNC: 4.2 MMOL/L (ref 3.4–5.3)
PROT SERPL-MCNC: 7.9 G/DL (ref 6.8–8.8)
PSA SERPL-ACNC: 2.17 UG/L (ref 0–4)
SODIUM SERPL-SCNC: 140 MMOL/L (ref 133–144)
TRIGL SERPL-MCNC: 332 MG/DL

## 2018-12-31 PROCEDURE — 80053 COMPREHEN METABOLIC PANEL: CPT | Performed by: PHYSICIAN ASSISTANT

## 2018-12-31 PROCEDURE — 36415 COLL VENOUS BLD VENIPUNCTURE: CPT | Performed by: PHYSICIAN ASSISTANT

## 2018-12-31 PROCEDURE — 99214 OFFICE O/P EST MOD 30 MIN: CPT | Performed by: PHYSICIAN ASSISTANT

## 2018-12-31 PROCEDURE — 80061 LIPID PANEL: CPT | Performed by: PHYSICIAN ASSISTANT

## 2018-12-31 PROCEDURE — G0103 PSA SCREENING: HCPCS | Performed by: PHYSICIAN ASSISTANT

## 2018-12-31 RX ORDER — OMEPRAZOLE 40 MG/1
CAPSULE, DELAYED RELEASE ORAL
Qty: 90 CAPSULE | Refills: 0 | Status: SHIPPED | OUTPATIENT
Start: 2018-12-31 | End: 2019-03-21

## 2018-12-31 ASSESSMENT — PAIN SCALES - GENERAL: PAINLEVEL: NO PAIN (0)

## 2018-12-31 NOTE — TELEPHONE ENCOUNTER
Left message for patient to return call to clinic. When call is returned please see message below    Amandeep Sethi,     Result Notes for Lipid panel reflex to direct LDL Fasting     Notes recorded by Arnulfo Damico PA-C on 12/31/2018 at 2:40 PM JESUSITA Lima:  Recent labs are attached.  Your overall kidney function appears to be stable similar to that of about 4 years ago.  We should recheck this in about 3 months.  Your triglycerides remain high.  It would be my advice that you begin an honest look at diet and exercise to try to help with overall cholesterol.  It would be my advice that you begin a medication to help decrease your triglycerides.  If you are willing to start medication please let us know so we can begin some Lopid for you.  Electronically signed:    Arnulfo Damico PA-C

## 2019-01-24 ENCOUNTER — TELEPHONE (OUTPATIENT)
Dept: FAMILY MEDICINE | Facility: OTHER | Age: 54
End: 2019-01-24

## 2019-01-24 NOTE — TELEPHONE ENCOUNTER
You placed a referral for patient to general surgery on 12/31/18..  Patient has not scheduled as of yet.      Please review and forward to team if follow up with the patient is needed.     Thank you!  Kassandra/Clinic Referrals Dyad II

## 2019-01-24 NOTE — TELEPHONE ENCOUNTER
Called patient and he stated that he is going to call and set up an appointment the first week of February.     Tosin Benson MA

## 2019-03-21 DIAGNOSIS — K21.9 GASTROESOPHAGEAL REFLUX DISEASE WITHOUT ESOPHAGITIS: ICD-10-CM

## 2019-03-21 DIAGNOSIS — K22.70 BARRETT'S ESOPHAGUS WITH ESOPHAGITIS: ICD-10-CM

## 2019-03-21 DIAGNOSIS — K20.90 BARRETT'S ESOPHAGUS WITH ESOPHAGITIS: ICD-10-CM

## 2019-03-22 RX ORDER — OMEPRAZOLE 40 MG/1
CAPSULE, DELAYED RELEASE ORAL
Qty: 90 CAPSULE | Refills: 1 | Status: SHIPPED | OUTPATIENT
Start: 2019-03-22 | End: 2020-02-25

## 2019-03-22 NOTE — TELEPHONE ENCOUNTER
Prilosec  Prescription approved per [facility] Refill Protocol.    Aarti Sandhu, RN, BSN

## 2019-04-18 ENCOUNTER — ANCILLARY PROCEDURE (OUTPATIENT)
Dept: GENERAL RADIOLOGY | Facility: OTHER | Age: 54
End: 2019-04-18
Attending: ORTHOPAEDIC SURGERY
Payer: COMMERCIAL

## 2019-04-18 ENCOUNTER — OFFICE VISIT (OUTPATIENT)
Dept: ORTHOPEDICS | Facility: OTHER | Age: 54
End: 2019-04-18
Payer: COMMERCIAL

## 2019-04-18 VITALS
HEIGHT: 73 IN | SYSTOLIC BLOOD PRESSURE: 110 MMHG | BODY MASS INDEX: 36.55 KG/M2 | DIASTOLIC BLOOD PRESSURE: 72 MMHG | WEIGHT: 275.8 LBS

## 2019-04-18 DIAGNOSIS — M25.561 CHRONIC PAIN OF RIGHT KNEE: ICD-10-CM

## 2019-04-18 DIAGNOSIS — M25.562 LEFT KNEE PAIN: ICD-10-CM

## 2019-04-18 DIAGNOSIS — G89.29 CHRONIC PAIN OF RIGHT KNEE: ICD-10-CM

## 2019-04-18 DIAGNOSIS — M17.12 PRIMARY OSTEOARTHRITIS OF LEFT KNEE: Primary | ICD-10-CM

## 2019-04-18 PROCEDURE — 99203 OFFICE O/P NEW LOW 30 MIN: CPT | Mod: 25 | Performed by: ORTHOPAEDIC SURGERY

## 2019-04-18 PROCEDURE — 73560 X-RAY EXAM OF KNEE 1 OR 2: CPT | Mod: LT

## 2019-04-18 PROCEDURE — 20610 DRAIN/INJ JOINT/BURSA W/O US: CPT | Mod: 50 | Performed by: ORTHOPAEDIC SURGERY

## 2019-04-18 RX ORDER — TRIAMCINOLONE ACETONIDE 40 MG/ML
40 INJECTION, SUSPENSION INTRA-ARTICULAR; INTRAMUSCULAR ONCE
Status: COMPLETED | OUTPATIENT
Start: 2019-04-18 | End: 2019-04-18

## 2019-04-18 RX ADMIN — TRIAMCINOLONE ACETONIDE 40 MG: 40 INJECTION, SUSPENSION INTRA-ARTICULAR; INTRAMUSCULAR at 14:56

## 2019-04-18 RX ADMIN — TRIAMCINOLONE ACETONIDE 40 MG: 40 INJECTION, SUSPENSION INTRA-ARTICULAR; INTRAMUSCULAR at 14:57

## 2019-04-18 ASSESSMENT — MIFFLIN-ST. JEOR: SCORE: 2139.9

## 2019-04-18 ASSESSMENT — PAIN SCALES - GENERAL: PAINLEVEL: SEVERE PAIN (7)

## 2019-04-18 NOTE — LETTER
4/18/2019         RE: Amol Peres  [address]  [place] [address]        Dear Colleague,    Thank you for referring your patient, Amol Peres, to the [facility]. Please see a copy of my visit note below.    ORTHOPEDIC CONSULT      Chief Complaint: Amol Peres is a 53 year old male who is being seen for Chief Complaint   Patient presents with     Knee Pain     bilateral knee pain     Consult     ref: Dr. Hill       History of Present Illness:   Patient previously saw Dr. Hill for his left knee - he had a steroid injection April 2018, which helped until recently.  His right knee has also started bothering him now for the past six months.  Right knee feels the same as his left knee.  Mechanism of Injury: No trauma or inciting event.  Location: Left knee medial, right knee global  Duration of Pain:  Left knee started bothering him again early this month.  Right knee pain for about six months  Rating of Pain:  moderate.    Pain Quality: sharp  Pain is better with: Rest  Pain is worse with:  twisting, stairs  Treatment so far consists of:  Steroid injection last year on left side, which helped until recently, physical therapy, Ibuprofen.   Associated Features: None  Prior history of related problems:   The pain is getting worse.    Patient's past medical, surgical, social and family histories reviewed.     Past Medical History:   Diagnosis Date     Chondromalacia patellae of left knee 11/28/2014     CTS (carpal tunnel syndrome) 11/28/2014    right > left      GERD (gastroesophageal reflux disease)      Hiatal hernia 2008     Hypertension 3/26/2010     Mild airflow obstruction on pulmonary function test 4/6/2016     Morbid obesity with body mass index of 40.0-44.9 in adult (H) 3/16/2016     Tobacco use disorder 3/16/2016       Past Surgical History:   Procedure Laterality Date     APPENDECTOMY  04/04/10     COLONOSCOPY N/A 10/16/2015    Procedure: COMBINED COLONOSCOPY,  SINGLE OR MULTIPLE BIOPSY/POLYPECTOMY BY BIOPSY;  Surgeon: Marcial Colvin MD;  Location:  GI     ESOPHAGOSCOPY, GASTROSCOPY, DUODENOSCOPY (EGD), COMBINED  12/27/2010    COMBINED ESOPHAGOSCOPY, GASTROSCOPY, DUODENOSCOPY (EGD), BIOPSY SINGLE OR MULTIPLE performed by JUSTINA CAR at  GI     ESOPHAGOSCOPY, GASTROSCOPY, DUODENOSCOPY (EGD), COMBINED  12/26/2012    Procedure: COMBINED ESOPHAGOSCOPY, GASTROSCOPY, DUODENOSCOPY (EGD), BIOPSY SINGLE OR MULTIPLE;  ESOPHAGOSCOPY, GASTROSCOPY, DUODENOSCOPY WITH  SINGLE BIOPSY;  Surgeon: Justina Car MD;  Location:  GI     ESOPHAGOSCOPY, GASTROSCOPY, DUODENOSCOPY (EGD), COMBINED N/A 10/16/2015    Procedure: COMBINED ESOPHAGOSCOPY, GASTROSCOPY, DUODENOSCOPY (EGD), BIOPSY SINGLE OR MULTIPLE;  Surgeon: Marcial Colvin MD;  Location:  GI     ESOPHAGOSCOPY, GASTROSCOPY, DUODENOSCOPY (EGD), COMBINED N/A 9/1/2017    Procedure: COMBINED ESOPHAGOSCOPY, GASTROSCOPY, DUODENOSCOPY (EGD), BIOPSY SINGLE OR MULTIPLE;  Esophagogastroduodenoscopy, Biopsy by Biopsy;  Surgeon: Kevin Fung MD;  Location:  GI      UGI ENDOSCOPY DIAG W BIOPSY  8/28/2009     HC UGI ENDOSCOPY, SIMPLE EXAM  09/23/08       Medications:    Current Outpatient Medications on File Prior to Visit:  omeprazole (PRILOSEC) 40 MG DR capsule TAKE ONE CAPSULE BY MOUTH ONCE DAILY   ibuprofen (ADVIL/MOTRIN) 800 MG tablet TAKE ONE TABLET BY MOUTH THREE TIMES A DAY (Patient not taking: Reported on 4/18/2019)   sildenafil (VIAGRA) 100 MG tablet Take 0.5-1 tablets ( mg) by mouth daily as needed for erectile dysfunction Take 30 min to 4 hours before intercourse.  Never use with nitroglycerin, terazosin or doxazosin. (Patient not taking: Reported on 4/18/2019)     No current facility-administered medications on file prior to visit.     Allergies   Allergen Reactions     No Known Drug Allergies        Social History     Occupational History     Not on file   Tobacco Use     Smoking status:  "Former Smoker     Packs/day: 1.00     Last attempt to quit: 3/19/2018     Years since quittin.0     Smokeless tobacco: Never Used   Substance and Sexual Activity     Alcohol use: Yes     Comment: occasionally     Drug use: No     Sexual activity: Yes     Partners: Female       Family History   Problem Relation Age of Onset     Cancer Mother         Cervical     Cancer Maternal Grandfather      Cancer Paternal Grandmother        REVIEW OF SYSTEMS  10 point review systems performed otherwise negative as noted as per history of present illness.    Physical Exam:  Vitals: /72   Ht 1.854 m (6' 1\")   Wt 125.1 kg (275 lb 12.8 oz)   BMI 36.39 kg/m     BMI= Body mass index is 36.39 kg/m .  Constitutional: healthy, alert and no acute distress   Psychiatric: mentation appears normal and affect normal/bright  NEURO: no focal deficits  RESP: Normal with easy respirations and no use of accessory muscles to breathe, no audible wheezing or retractions  CV: No peripheral edema         Regular rate and rhythm by palpation  SKIN: No erythema, rashes, excoriation, or breakdown. No evidence of infection.   JOINT/EXTREMITIES:bilateral Knee Exam: Inspection: AP/lateral alignment normal, No effusion  Tender: Medial joint line  Non-tender: lateral patellar facet, medial patellar facet, lateral joint line  Active Range of Motion: full flexion, full extension  Special tests: normal Valgus stress test, normal Varus, negative Lachman's test, negative posterior drawer     GAIT: not tested     Diagnostic Modalities:  left knee X-ray: No acute fractures or dislocations.  Medial joint space narrowing.  Right knee x-ray: (1 year ago) shows no acute fractures or dislocations.  No significant joint space narrowing  Independent visualization of the images was performed.      Impression: left knee primary osteoarthritis  Right knee pain    Plan:  All of the above pertinent physical exam and imaging modalities findings was reviewed with " Amol.                                          INJECTION PROCEDURE:  The patient was counseled about an  injection, including discussion of risks (including infection), contents of the injection, rationale for performing the injection, and expected benefits of the injection. The skin was prepped with alcohol and betadine and then utilizing sterile technique an injection of the bilateral knee joint from the anterolateral approach in the seated position was performed. The injection consisted 1ml of Kenalog (40mg per 1 ml) mixed with 3ml of 0.5% Marcaine. The patient tolerated the injection well, and there were no complications. The injection site was covered with a Band-Aid. The injection was performed by LAN Chiu.     Previous injection provided good relief.  We discussed repeat injections.  Right knee does not show any significant radiographic abnormality a year ago.  However he reports symptoms that are very similar to his left which is consistent with osteoarthritis.  Recommend trial of conservative care.    Return to clinic 4-6 , week(s), PRN, or sooner as needed for changes.  Re-x-ray on return: Cheri Quezada D.O.    Prior to injection, verified patient identity using patient's name and date of birth.  Due to injection administration, patient instructed to remain in clinic for 15 minutes  afterwards, and to report any adverse reaction to me immediately.    Joint injection was performed.      Drug Amount Wasted:  None.  Vial/Syringe: Single dose vial  Expiration Date:  9/2020  The following medication was given by Malathi Navarro PA-C:     MEDICATION: Kenalog 40mg/1ml  ROUTE: Joint Injection  SITE: bilateral knee  DOSE: 1 mL  LOT #: [id number]  : [employer]  EXPIRATION DATE:  9/2020  NDC: [address]-7                  Again, thank you for allowing me to participate in the care of your patient.        Sincerely,        Joel Quezada DO

## 2019-04-18 NOTE — PROGRESS NOTES
Prior to injection, verified patient identity using patient's name and date of birth.  Due to injection administration, patient instructed to remain in clinic for 15 minutes  afterwards, and to report any adverse reaction to me immediately.    Joint injection was performed.      Drug Amount Wasted:  None.  Vial/Syringe: Single dose vial  Expiration Date:  9/2020  The following medication was given by Malathi Navarro PA-C:     MEDICATION: Kenalog 40mg/1ml  ROUTE: Joint Injection  SITE: bilateral knee  DOSE: 1 mL  LOT #: [id number]  : [employer]  EXPIRATION DATE:  9/2020  NDC: [address]-0

## 2019-04-18 NOTE — PROGRESS NOTES
ORTHOPEDIC CONSULT      Chief Complaint: Amol Peres is a 53 year old male who is being seen for Chief Complaint   Patient presents with     Knee Pain     bilateral knee pain     Consult     ref: Dr. Hill       History of Present Illness:   Patient previously saw Dr. Hill for his left knee - he had a steroid injection April 2018, which helped until recently.  His right knee has also started bothering him now for the past six months.  Right knee feels the same as his left knee.  Mechanism of Injury: No trauma or inciting event.  Location: Left knee medial, right knee global  Duration of Pain:  Left knee started bothering him again early this month.  Right knee pain for about six months  Rating of Pain:  moderate.    Pain Quality: sharp  Pain is better with: Rest  Pain is worse with:  twisting, stairs  Treatment so far consists of:  Steroid injection last year on left side, which helped until recently, physical therapy, Ibuprofen.   Associated Features: None  Prior history of related problems:   The pain is getting worse.    Patient's past medical, surgical, social and family histories reviewed.     Past Medical History:   Diagnosis Date     Chondromalacia patellae of left knee 11/28/2014     CTS (carpal tunnel syndrome) 11/28/2014    right > left      GERD (gastroesophageal reflux disease)      Hiatal hernia 2008     Hypertension 3/26/2010     Mild airflow obstruction on pulmonary function test 4/6/2016     Morbid obesity with body mass index of 40.0-44.9 in adult (H) 3/16/2016     Tobacco use disorder 3/16/2016       Past Surgical History:   Procedure Laterality Date     APPENDECTOMY  04/04/10     COLONOSCOPY N/A 10/16/2015    Procedure: COMBINED COLONOSCOPY, SINGLE OR MULTIPLE BIOPSY/POLYPECTOMY BY BIOPSY;  Surgeon: Marcial Colvin MD;  Location:  GI     ESOPHAGOSCOPY, GASTROSCOPY, DUODENOSCOPY (EGD), COMBINED  12/27/2010    COMBINED ESOPHAGOSCOPY, GASTROSCOPY, DUODENOSCOPY (EGD), BIOPSY SINGLE OR  MULTIPLE performed by JUSTINA CAR at  GI     ESOPHAGOSCOPY, GASTROSCOPY, DUODENOSCOPY (EGD), COMBINED  2012    Procedure: COMBINED ESOPHAGOSCOPY, GASTROSCOPY, DUODENOSCOPY (EGD), BIOPSY SINGLE OR MULTIPLE;  ESOPHAGOSCOPY, GASTROSCOPY, DUODENOSCOPY WITH  SINGLE BIOPSY;  Surgeon: Justina Car MD;  Location:  GI     ESOPHAGOSCOPY, GASTROSCOPY, DUODENOSCOPY (EGD), COMBINED N/A 10/16/2015    Procedure: COMBINED ESOPHAGOSCOPY, GASTROSCOPY, DUODENOSCOPY (EGD), BIOPSY SINGLE OR MULTIPLE;  Surgeon: Marcial Colvin MD;  Location:  GI     ESOPHAGOSCOPY, GASTROSCOPY, DUODENOSCOPY (EGD), COMBINED N/A 2017    Procedure: COMBINED ESOPHAGOSCOPY, GASTROSCOPY, DUODENOSCOPY (EGD), BIOPSY SINGLE OR MULTIPLE;  Esophagogastroduodenoscopy, Biopsy by Biopsy;  Surgeon: Kevin Fung MD;  Location:  GI      UGI ENDOSCOPY DIAG W BIOPSY  2009     HC UGI ENDOSCOPY, SIMPLE EXAM  08       Medications:    Current Outpatient Medications on File Prior to Visit:  omeprazole (PRILOSEC) 40 MG DR capsule TAKE ONE CAPSULE BY MOUTH ONCE DAILY   ibuprofen (ADVIL/MOTRIN) 800 MG tablet TAKE ONE TABLET BY MOUTH THREE TIMES A DAY (Patient not taking: Reported on 2019)   sildenafil (VIAGRA) 100 MG tablet Take 0.5-1 tablets ( mg) by mouth daily as needed for erectile dysfunction Take 30 min to 4 hours before intercourse.  Never use with nitroglycerin, terazosin or doxazosin. (Patient not taking: Reported on 2019)     No current facility-administered medications on file prior to visit.     Allergies   Allergen Reactions     No Known Drug Allergies        Social History     Occupational History     Not on file   Tobacco Use     Smoking status: Former Smoker     Packs/day: 1.00     Last attempt to quit: 3/19/2018     Years since quittin.0     Smokeless tobacco: Never Used   Substance and Sexual Activity     Alcohol use: Yes     Comment: occasionally     Drug use: No     Sexual activity:  "Yes     Partners: Female       Family History   Problem Relation Age of Onset     Cancer Mother         Cervical     Cancer Maternal Grandfather      Cancer Paternal Grandmother        REVIEW OF SYSTEMS  10 point review systems performed otherwise negative as noted as per history of present illness.    Physical Exam:  Vitals: /72   Ht 1.854 m (6' 1\")   Wt 125.1 kg (275 lb 12.8 oz)   BMI 36.39 kg/m    BMI= Body mass index is 36.39 kg/m .  Constitutional: healthy, alert and no acute distress   Psychiatric: mentation appears normal and affect normal/bright  NEURO: no focal deficits  RESP: Normal with easy respirations and no use of accessory muscles to breathe, no audible wheezing or retractions  CV: No peripheral edema         Regular rate and rhythm by palpation  SKIN: No erythema, rashes, excoriation, or breakdown. No evidence of infection.   JOINT/EXTREMITIES:bilateral Knee Exam: Inspection: AP/lateral alignment normal, No effusion  Tender: Medial joint line  Non-tender: lateral patellar facet, medial patellar facet, lateral joint line  Active Range of Motion: full flexion, full extension  Special tests: normal Valgus stress test, normal Varus, negative Lachman's test, negative posterior drawer     GAIT: not tested     Diagnostic Modalities:  left knee X-ray: No acute fractures or dislocations.  Medial joint space narrowing.  Right knee x-ray: (1 year ago) shows no acute fractures or dislocations.  No significant joint space narrowing  Independent visualization of the images was performed.      Impression: left knee primary osteoarthritis  Right knee pain    Plan:  All of the above pertinent physical exam and imaging modalities findings was reviewed with Amol.                                          INJECTION PROCEDURE:  The patient was counseled about an  injection, including discussion of risks (including infection), contents of the injection, rationale for performing the injection, and expected " benefits of the injection. The skin was prepped with alcohol and betadine and then utilizing sterile technique an injection of the bilateral knee joint from the anterolateral approach in the seated position was performed. The injection consisted 1ml of Kenalog (40mg per 1 ml) mixed with 3ml of 0.5% Marcaine. The patient tolerated the injection well, and there were no complications. The injection site was covered with a Band-Aid. The injection was performed by LAN Chiu.     Previous injection provided good relief.  We discussed repeat injections.  Right knee does not show any significant radiographic abnormality a year ago.  However he reports symptoms that are very similar to his left which is consistent with osteoarthritis.  Recommend trial of conservative care.    Return to clinic 4-6 , week(s), PRN, or sooner as needed for changes.  Re-x-ray on return: No    Richard Quezada D.O.

## 2019-05-13 DIAGNOSIS — M22.42 CHONDROMALACIA PATELLAE OF LEFT KNEE: ICD-10-CM

## 2019-05-13 RX ORDER — IBUPROFEN 800 MG/1
TABLET, FILM COATED ORAL
Qty: 100 TABLET | Refills: 3 | Status: SHIPPED | OUTPATIENT
Start: 2019-05-13 | End: 2020-01-03

## 2020-01-02 DIAGNOSIS — M22.42 CHONDROMALACIA PATELLAE OF LEFT KNEE: ICD-10-CM

## 2020-01-03 RX ORDER — IBUPROFEN 800 MG/1
TABLET, FILM COATED ORAL
Qty: 100 TABLET | Refills: 3 | Status: SHIPPED | OUTPATIENT
Start: 2020-01-03 | End: 2020-11-06

## 2020-01-03 NOTE — TELEPHONE ENCOUNTER
"Pending Prescriptions:                       Disp   Refills    ibuprofen (ADVIL/MOTRIN) 800 MG tablet     100 ta*3        Sig: TAKE ONE TABLET BY MOUTH THREE TIMES A DAY    Routing refill request to provider for review/approval because:  NSAID Medications Failed   ibuprofen (ADVIL/MOTRIN) 800 MG tablet   Rerun Protocol (1/2/2020 12:19 PM)      Normal ALT on file in past 12 months          Recent Labs   Lab Test 12/31/18  1117   ALT 40            Normal AST on file in past 12 months          Recent Labs   Lab Test 12/31/18  1117   AST 23            Recent (12 mo) or future (30 days) visit within the authorizing provider's specialty      Patient has had an office visit with the authorizing provider or a provider within the authorizing providers department within the previous 12 mos or has a future within next 30 days. See \"Patient Info\" tab in inbasket, or \"Choose Columns\" in Meds & Orders section of the refill encounter.              Normal CBC on file in past 12 months          Recent Labs   Lab Test 03/16/16  1012   WBC 5.3   RBC 5.26   HGB 16.6   HCT 45.5                   Normal serum creatinine on file in past 12 months          Recent Labs   Lab Test 12/31/18  1117   CR 1.22            Blood pressure under 140/90 in past 12 months          BP Readings from Last 3 Encounters:   04/18/19 110/72   12/31/18 128/86   04/30/18 (!) 120/96                    "

## 2020-02-20 NOTE — PROGRESS NOTES
SUBJECTIVE:   CC: Amol Peres is an 54 year old male who presents for preventative health visit.     Healthy Habits:     Getting at least 3 servings of Calcium per day:  NO    Bi-annual eye exam:  NO    Dental care twice a year:  NO    Sleep apnea or symptoms of sleep apnea:  None    Diet:  Regular (no restrictions)    Frequency of exercise:  None    Taking medications regularly:  Yes    Medication side effects:  None    PHQ-2 Total Score: 0    Additional concerns today:  No    Today's PHQ-2 Score:   PHQ-2 (  [employer]) 2020   Q1: Little interest or pleasure in doing things 0   Q2: Feeling down, depressed or hopeless 0   PHQ-2 Score 0   Q1: Little interest or pleasure in doing things Not at all   Q2: Feeling down, depressed or hopeless Not at all   PHQ-2 Score 0       Abuse: Current or Past(Physical, Sexual or Emotional)- No  Do you feel safe in your environment? Yes    Have you ever done Advance Care Planning? (For example, a Health Directive, POLST, or a discussion with a medical provider or your loved ones about your wishes): No, advance care planning information given to patient to review.  Patient plans to discuss their wishes with loved ones or provider.      Social History     Tobacco Use     Smoking status: Former Smoker     Packs/day: 1.00     Last attempt to quit: 3/19/2018     Years since quittin.9     Smokeless tobacco: Never Used   Substance Use Topics     Alcohol use: Yes     Comment: occasionally         Alcohol Use 2020   Prescreen: >3 drinks/day or >7 drinks/week? No   No flowsheet data found.    Last PSA:   PSA   Date Value Ref Range Status   2018 2.17 0 - 4 ug/L Final     Comment:     Assay Method:  Chemiluminescence using Siemens Vista analyzer       Reviewed orders with patient. Reviewed health maintenance and updated orders accordingly - Yes  Lab work is in process  Labs reviewed in EPIC  BP Readings from Last 3 Encounters:   20 116/68   19 110/72    12/31/18 128/86    Wt Readings from Last 3 Encounters:   02/25/20 127.5 kg (281 lb)   04/18/19 125.1 kg (275 lb 12.8 oz)   12/31/18 142.1 kg (313 lb 3.2 oz)                  Patient Active Problem List   Diagnosis     Hyperlipidemia LDL goal <130     Hypertension goal BP (blood pressure) < 140/90     Pain in thoracic spine     CTS (carpal tunnel syndrome)     Chondromalacia patellae of left knee     Tobacco use disorder     Mild airflow obstruction on pulmonary function test     Lipoma of skin and subcutaneous tissue     Primary osteoarthritis of left knee     Morbid obesity (H)     Other chest pain     Past Surgical History:   Procedure Laterality Date     APPENDECTOMY  04/04/10     COLONOSCOPY N/A 10/16/2015    Procedure: COMBINED COLONOSCOPY, SINGLE OR MULTIPLE BIOPSY/POLYPECTOMY BY BIOPSY;  Surgeon: Marcial Colvin MD;  Location:  GI     ESOPHAGOSCOPY, GASTROSCOPY, DUODENOSCOPY (EGD), COMBINED  12/27/2010    COMBINED ESOPHAGOSCOPY, GASTROSCOPY, DUODENOSCOPY (EGD), BIOPSY SINGLE OR MULTIPLE performed by JUSTINA CAR at  GI     ESOPHAGOSCOPY, GASTROSCOPY, DUODENOSCOPY (EGD), COMBINED  12/26/2012    Procedure: COMBINED ESOPHAGOSCOPY, GASTROSCOPY, DUODENOSCOPY (EGD), BIOPSY SINGLE OR MULTIPLE;  ESOPHAGOSCOPY, GASTROSCOPY, DUODENOSCOPY WITH  SINGLE BIOPSY;  Surgeon: Justina Car MD;  Location:  GI     ESOPHAGOSCOPY, GASTROSCOPY, DUODENOSCOPY (EGD), COMBINED N/A 10/16/2015    Procedure: COMBINED ESOPHAGOSCOPY, GASTROSCOPY, DUODENOSCOPY (EGD), BIOPSY SINGLE OR MULTIPLE;  Surgeon: Marcial Colvin MD;  Location:  GI     ESOPHAGOSCOPY, GASTROSCOPY, DUODENOSCOPY (EGD), COMBINED N/A 9/1/2017    Procedure: COMBINED ESOPHAGOSCOPY, GASTROSCOPY, DUODENOSCOPY (EGD), BIOPSY SINGLE OR MULTIPLE;  Esophagogastroduodenoscopy, Biopsy by Biopsy;  Surgeon: Kevin Fung MD;  Location: [facility]      UGI ENDOSCOPY DIAG W BIOPSY  8/28/2009      UGI ENDOSCOPY, SIMPLE EXAM  09/23/08       Social  History     Tobacco Use     Smoking status: Former Smoker     Packs/day: 1.00     Last attempt to quit: 3/19/2018     Years since quittin.9     Smokeless tobacco: Never Used   Substance Use Topics     Alcohol use: Yes     Comment: occasionally     Family History   Problem Relation Age of Onset     Cancer Mother         Cervical     Cancer Maternal Grandfather      Cancer Paternal Grandmother          Current Outpatient Medications   Medication Sig Dispense Refill     ibuprofen (ADVIL/MOTRIN) 800 MG tablet TAKE ONE TABLET BY MOUTH THREE TIMES A  tablet 3     omeprazole (PRILOSEC) 40 MG DR capsule Take 1 capsule (40 mg) by mouth daily 90 capsule 1     sildenafil (VIAGRA) 100 MG tablet Take 0.5-1 tablets ( mg) by mouth daily as needed for erectile dysfunction Take 30 min to 4 hours before intercourse.  Never use with nitroglycerin, terazosin or doxazosin. (Patient not taking: Reported on 2019) 12 tablet 0     Allergies   Allergen Reactions     No Known Drug Allergies      Recent Labs   Lab Test 18  1117 16  1012 14  0817 14  1148   * 77 112 132*   HDL 37* 24* 37* 35*   TRIG 332* 354* 164* 203*   ALT 40 48 40 62   CR 1.22 1.23 1.06 1.19   GFRESTIMATED 66 62 73 65   GFRESTBLACK 77 75 89 78   POTASSIUM 4.2 4.3 4.5 4.3   TSH  --  1.84  --  1.59        Reviewed and updated as needed this visit by clinical staff  Tobacco  Allergies  Meds  Med Hx  Surg Hx  Fam Hx  Soc Hx        Reviewed and updated as needed this visit by Provider        Past Medical History:   Diagnosis Date     Chondromalacia patellae of left knee 2014     CTS (carpal tunnel syndrome) 2014    right > left      GERD (gastroesophageal reflux disease)      Hiatal hernia      Hypertension 3/26/2010     Mild airflow obstruction on pulmonary function test 2016     Morbid obesity with body mass index of 40.0-44.9 in adult (H) 3/16/2016     Tobacco use disorder 3/16/2016        Review of  "Systems   Constitutional: Negative for chills and fever.   HENT: Negative for congestion and ear pain.    Eyes: Negative for pain.   Respiratory: Negative for cough.    Cardiovascular: Positive for chest pain.   Gastrointestinal: Negative for abdominal pain, constipation, diarrhea and hematochezia.   Genitourinary: Negative for hematuria.   Neurological: Negative for dizziness.   Psychiatric/Behavioral: The patient is not nervous/anxious.        OBJECTIVE:   /68   Pulse 70   Temp 98  F (36.7  C) (Temporal)   Resp 16   Ht 1.842 m (6' 0.5\")   Wt 127.5 kg (281 lb)   SpO2 97%   BMI 37.59 kg/m      Physical Exam  GENERAL: healthy, alert and no distress  EYES: Eyes grossly normal to inspection, PERRL and conjunctivae and sclerae normal  HENT: ear canals and TM's normal, nose and mouth without ulcers or lesions  NECK: no adenopathy, no asymmetry, masses, or scars and thyroid normal to palpation  RESP: lungs clear to auscultation - no rales, rhonchi or wheezes  CV: regular rate and rhythm, normal S1 S2, no S3 or S4, no murmur, click or rub, no peripheral edema and peripheral pulses strong  ABDOMEN: soft, nontender, no hepatosplenomegaly, no masses and bowel sounds normal  MS: no gross musculoskeletal defects noted, no edema  SKIN: no suspicious lesions or rashes  NEURO: Normal strength and tone, mentation intact and speech normal  PSYCH: mentation appears normal, affect normal/bright    Diagnostic Test Results:  Labs reviewed in Epic    ASSESSMENT/PLAN:   1. Routine general medical examination at a health care facility  Screening labs to be done as part of his overall health care  - TOBACCO CESSATION ORDER FOR   - Hepatitis C Screen Reflex to HCV RNA Quant and Genotype  - Lipid panel reflex to direct LDL Fasting  - EKG 12-lead complete w/read - Clinics  - Echocardiogram Exercise Stress; Future  - CBC with platelets  - Comprehensive metabolic panel  - PSA, screen    2. Mild airflow obstruction on pulmonary " "function test  Ongoing tobacco abuse discussed today strongly recommended cessation completely.    3. Harmon's esophagus with esophagitis  4. Gastroesophageal reflux disease with esophagitis  Refilled his medication but he reports he has not been taking this regularly.  - omeprazole (PRILOSEC) 40 MG DR capsule; Take 1 capsule (40 mg) by mouth daily  Dispense: 90 capsule; Refill: 1    5. Hyperlipidemia LDL goal <130  6. Morbid obesity (H)  7. Hypertension goal BP (blood pressure) < 140/90  10. Other chest pain  11. Tobacco abuse disorder  Rechecking related labs and further evaluation of his overall chest pain.   I suspect this to be chest wall pain at this point time but with his use of tobacco we will pursue this further EKG within normal limits today.  - Lipid panel reflex to direct LDL Fasting  - EKG 12-lead complete w/read - Clinics  - Echocardiogram Exercise Stress; Future    8. Chondromalacia patellae of left knee  9. Primary osteoarthritis of left knee  Ongoing struggle with his left knee.  We will ask orthopedics to reevaluate this and consider an additional injection or whether further intervention needs to be done.  - ORTHOPEDICS ADULT REFERRAL              COUNSELING:   Reviewed preventive health counseling, as reflected in patient instructions       Regular exercise       Healthy diet/nutrition       Vision screening       Hearing screening    Estimated body mass index is 37.59 kg/m  as calculated from the following:    Height as of this encounter: 1.842 m (6' 0.5\").    Weight as of this encounter: 127.5 kg (281 lb).     Weight management plan: Discussed healthy diet and exercise guidelines     reports that he quit smoking about 23 months ago. He smoked 1.00 pack per day. He has never used smokeless tobacco.  Tobacco Cessation Action Plan: Information offered: Patient not interested at this time    Counseling Resources:  ATP IV Guidelines  Pooled Cohorts Equation Calculator  FRAX Risk Assessment  ICSI " Preventive Guidelines  Dietary Guidelines for Americans, 2010  USDA's MyPlate  ASA Prophylaxis  Lung CA Screening    Arnulfo Reno PA-C  [facility]

## 2020-02-25 ENCOUNTER — OFFICE VISIT (OUTPATIENT)
Dept: FAMILY MEDICINE | Facility: OTHER | Age: 55
End: 2020-02-25
Payer: COMMERCIAL

## 2020-02-25 VITALS
RESPIRATION RATE: 16 BRPM | OXYGEN SATURATION: 97 % | DIASTOLIC BLOOD PRESSURE: 68 MMHG | SYSTOLIC BLOOD PRESSURE: 116 MMHG | HEIGHT: 73 IN | TEMPERATURE: 98 F | WEIGHT: 281 LBS | HEART RATE: 70 BPM | BODY MASS INDEX: 37.24 KG/M2

## 2020-02-25 DIAGNOSIS — Z72.0 TOBACCO ABUSE DISORDER: ICD-10-CM

## 2020-02-25 DIAGNOSIS — Z00.00 ROUTINE GENERAL MEDICAL EXAMINATION AT A HEALTH CARE FACILITY: Primary | ICD-10-CM

## 2020-02-25 DIAGNOSIS — R07.89 OTHER CHEST PAIN: ICD-10-CM

## 2020-02-25 DIAGNOSIS — K22.70 BARRETT'S ESOPHAGUS WITH ESOPHAGITIS: ICD-10-CM

## 2020-02-25 DIAGNOSIS — R94.2 MILD AIRFLOW OBSTRUCTION ON PULMONARY FUNCTION TEST: ICD-10-CM

## 2020-02-25 DIAGNOSIS — M22.42 CHONDROMALACIA PATELLAE OF LEFT KNEE: ICD-10-CM

## 2020-02-25 DIAGNOSIS — I10 HYPERTENSION GOAL BP (BLOOD PRESSURE) < 140/90: ICD-10-CM

## 2020-02-25 DIAGNOSIS — K20.90 BARRETT'S ESOPHAGUS WITH ESOPHAGITIS: ICD-10-CM

## 2020-02-25 DIAGNOSIS — K21.00 GASTROESOPHAGEAL REFLUX DISEASE WITH ESOPHAGITIS: ICD-10-CM

## 2020-02-25 DIAGNOSIS — E66.01 MORBID OBESITY (H): ICD-10-CM

## 2020-02-25 DIAGNOSIS — J98.8 MILD AIRFLOW OBSTRUCTION ON PULMONARY FUNCTION TEST: ICD-10-CM

## 2020-02-25 DIAGNOSIS — E78.5 HYPERLIPIDEMIA LDL GOAL <130: ICD-10-CM

## 2020-02-25 DIAGNOSIS — M17.12 PRIMARY OSTEOARTHRITIS OF LEFT KNEE: ICD-10-CM

## 2020-02-25 LAB
ALBUMIN SERPL-MCNC: 4 G/DL (ref 3.4–5)
ALP SERPL-CCNC: 56 U/L (ref 40–150)
ALT SERPL W P-5'-P-CCNC: 29 U/L (ref 0–70)
ANION GAP SERPL CALCULATED.3IONS-SCNC: 4 MMOL/L (ref 3–14)
AST SERPL W P-5'-P-CCNC: 16 U/L (ref 0–45)
BILIRUB SERPL-MCNC: 1.1 MG/DL (ref 0.2–1.3)
BUN SERPL-MCNC: 14 MG/DL (ref 7–30)
CALCIUM SERPL-MCNC: 8.5 MG/DL (ref 8.5–10.1)
CHLORIDE SERPL-SCNC: 110 MMOL/L (ref 94–109)
CHOLEST SERPL-MCNC: 201 MG/DL
CO2 SERPL-SCNC: 27 MMOL/L (ref 20–32)
CREAT SERPL-MCNC: 1.07 MG/DL (ref 0.66–1.25)
ERYTHROCYTE [DISTWIDTH] IN BLOOD BY AUTOMATED COUNT: 13.8 % (ref 10–15)
GFR SERPL CREATININE-BSD FRML MDRD: 77 ML/MIN/{1.73_M2}
GLUCOSE SERPL-MCNC: 93 MG/DL (ref 70–99)
HCT VFR BLD AUTO: 45.8 % (ref 40–53)
HDLC SERPL-MCNC: 37 MG/DL
HGB BLD-MCNC: 15.4 G/DL (ref 13.3–17.7)
LDLC SERPL CALC-MCNC: 143 MG/DL
MCH RBC QN AUTO: 30.9 PG (ref 26.5–33)
MCHC RBC AUTO-ENTMCNC: 33.6 G/DL (ref 31.5–36.5)
MCV RBC AUTO: 92 FL (ref 78–100)
NONHDLC SERPL-MCNC: 164 MG/DL
PLATELET # BLD AUTO: 198 10E9/L (ref 150–450)
POTASSIUM SERPL-SCNC: 4.7 MMOL/L (ref 3.4–5.3)
PROT SERPL-MCNC: 7.4 G/DL (ref 6.8–8.8)
PSA SERPL-ACNC: 1.73 UG/L (ref 0–4)
RBC # BLD AUTO: 4.98 10E12/L (ref 4.4–5.9)
SODIUM SERPL-SCNC: 141 MMOL/L (ref 133–144)
TRIGL SERPL-MCNC: 104 MG/DL
WBC # BLD AUTO: 6.1 10E9/L (ref 4–11)

## 2020-02-25 PROCEDURE — 86803 HEPATITIS C AB TEST: CPT | Performed by: PHYSICIAN ASSISTANT

## 2020-02-25 PROCEDURE — G0103 PSA SCREENING: HCPCS | Performed by: PHYSICIAN ASSISTANT

## 2020-02-25 PROCEDURE — 85027 COMPLETE CBC AUTOMATED: CPT | Performed by: PHYSICIAN ASSISTANT

## 2020-02-25 PROCEDURE — 93000 ELECTROCARDIOGRAM COMPLETE: CPT | Performed by: PHYSICIAN ASSISTANT

## 2020-02-25 PROCEDURE — 36415 COLL VENOUS BLD VENIPUNCTURE: CPT | Performed by: PHYSICIAN ASSISTANT

## 2020-02-25 PROCEDURE — 99396 PREV VISIT EST AGE 40-64: CPT | Performed by: PHYSICIAN ASSISTANT

## 2020-02-25 PROCEDURE — 99213 OFFICE O/P EST LOW 20 MIN: CPT | Mod: 25 | Performed by: PHYSICIAN ASSISTANT

## 2020-02-25 PROCEDURE — 80061 LIPID PANEL: CPT | Performed by: PHYSICIAN ASSISTANT

## 2020-02-25 PROCEDURE — 80053 COMPREHEN METABOLIC PANEL: CPT | Performed by: PHYSICIAN ASSISTANT

## 2020-02-25 RX ORDER — OMEPRAZOLE 40 MG/1
40 CAPSULE, DELAYED RELEASE ORAL DAILY
Qty: 90 CAPSULE | Refills: 1 | Status: SHIPPED | OUTPATIENT
Start: 2020-02-25 | End: 2021-06-24

## 2020-02-25 ASSESSMENT — ENCOUNTER SYMPTOMS
HEMATOCHEZIA: 0
DIZZINESS: 0
DIARRHEA: 0
CONSTIPATION: 0
CHILLS: 0
COUGH: 0
FEVER: 0
ABDOMINAL PAIN: 0
NERVOUS/ANXIOUS: 0
EYE PAIN: 0
HEMATURIA: 0

## 2020-02-25 ASSESSMENT — PAIN SCALES - GENERAL: PAINLEVEL: MODERATE PAIN (5)

## 2020-02-25 ASSESSMENT — MIFFLIN-ST. JEOR: SCORE: 2150.55

## 2020-02-26 ENCOUNTER — HOSPITAL ENCOUNTER (OUTPATIENT)
Dept: CARDIOLOGY | Facility: CLINIC | Age: 55
Discharge: HOME | End: 2020-02-26
Attending: PHYSICIAN ASSISTANT | Admitting: PHYSICIAN ASSISTANT
Payer: COMMERCIAL

## 2020-02-26 DIAGNOSIS — I10 HYPERTENSION GOAL BP (BLOOD PRESSURE) < 140/90: ICD-10-CM

## 2020-02-26 DIAGNOSIS — Z72.0 TOBACCO ABUSE DISORDER: ICD-10-CM

## 2020-02-26 DIAGNOSIS — E78.5 HYPERLIPIDEMIA LDL GOAL <130: ICD-10-CM

## 2020-02-26 DIAGNOSIS — Z00.00 ROUTINE GENERAL MEDICAL EXAMINATION AT A HEALTH CARE FACILITY: ICD-10-CM

## 2020-02-26 LAB — HCV AB SERPL QL IA: NONREACTIVE

## 2020-02-26 PROCEDURE — 93018 CV STRESS TEST I&R ONLY: CPT | Performed by: INTERNAL MEDICINE

## 2020-02-26 PROCEDURE — 25500064 ZZH RX 255 OP 636: Performed by: INTERNAL MEDICINE

## 2020-02-26 PROCEDURE — 93321 DOPPLER ECHO F-UP/LMTD STD: CPT | Mod: 26 | Performed by: INTERNAL MEDICINE

## 2020-02-26 PROCEDURE — 93325 DOPPLER ECHO COLOR FLOW MAPG: CPT | Mod: 26 | Performed by: INTERNAL MEDICINE

## 2020-02-26 PROCEDURE — 40000264 ECHO STRESS ECHOCARDIOGRAM

## 2020-02-26 PROCEDURE — 93016 CV STRESS TEST SUPVJ ONLY: CPT | Performed by: INTERNAL MEDICINE

## 2020-02-26 PROCEDURE — 93350 STRESS TTE ONLY: CPT | Mod: 26 | Performed by: INTERNAL MEDICINE

## 2020-02-26 RX ADMIN — HUMAN ALBUMIN MICROSPHERES AND PERFLUTREN 5 ML: 10; .22 INJECTION, SOLUTION INTRAVENOUS at 15:07

## 2020-02-27 ENCOUNTER — TELEPHONE (OUTPATIENT)
Dept: FAMILY MEDICINE | Facility: OTHER | Age: 55
End: 2020-02-27

## 2020-02-27 DIAGNOSIS — E78.5 HYPERLIPIDEMIA LDL GOAL <130: Primary | ICD-10-CM

## 2020-02-27 NOTE — TELEPHONE ENCOUNTER
Per provider letter written.  Yeimi Mayer CMA ([facility])    Notes recorded by Arnulfo Damico PA-C on 2/26/2020 at 8:23 PM CST  Please, send normal letter with a copy of results and any advice listed.  Electronically signed:    Arnulfo Damico PA-C

## 2020-02-27 NOTE — LETTER
February 27, 2020      Amol Peres  [address]  [place] [address]        Dear ,    We are writing to inform you of your test results.    Your test results fall within the expected range(s) or remain unchanged from previous results.  Please continue with current treatment plan.      If you have any questions or concerns, please call the clinic at the number listed above.       Sincerely,        Arnulfo Reno PA-C

## 2020-02-27 NOTE — TELEPHONE ENCOUNTER
Spoke to patient he is willing to start medication. Pharmacy and medication are pending for provider review and signature.   Yeimi Mayer CMA ([facility])    Your complete blood cell count, prostate-specific antigen screen and hepatitis C screen were normal.  Your chloride was just a little bit high but we can certainly recheck that in about 2 months.  Your cholesterol was elevated more than it has been in the past.  It would appear that you should take a careful look at diet and exercise to try to reverse this trend.  It may be a very good idea for you to begin a cholesterol medication in the form of Lipitor 20 mg each night and follow-up in 3 months.  Please let us know if you are willing to do so and we can send a prescription to the pharmacy for you.  Electronically signed:

## 2020-03-02 RX ORDER — ATORVASTATIN CALCIUM 20 MG/1
20 TABLET, FILM COATED ORAL DAILY
Qty: 90 TABLET | Refills: 1 | Status: ON HOLD | OUTPATIENT
Start: 2020-03-02 | End: 2021-01-12

## 2020-03-04 ENCOUNTER — ANCILLARY PROCEDURE (OUTPATIENT)
Dept: GENERAL RADIOLOGY | Facility: CLINIC | Age: 55
End: 2020-03-04
Attending: ORTHOPAEDIC SURGERY
Payer: COMMERCIAL

## 2020-03-04 ENCOUNTER — OFFICE VISIT (OUTPATIENT)
Dept: ORTHOPEDICS | Facility: CLINIC | Age: 55
End: 2020-03-04
Payer: COMMERCIAL

## 2020-03-04 VITALS
HEIGHT: 72 IN | WEIGHT: 277.7 LBS | SYSTOLIC BLOOD PRESSURE: 127 MMHG | DIASTOLIC BLOOD PRESSURE: 82 MMHG | BODY MASS INDEX: 37.61 KG/M2

## 2020-03-04 DIAGNOSIS — M17.12 PRIMARY OSTEOARTHRITIS OF LEFT KNEE: ICD-10-CM

## 2020-03-04 DIAGNOSIS — G89.29 CHRONIC PAIN OF RIGHT KNEE: ICD-10-CM

## 2020-03-04 DIAGNOSIS — M17.12 PRIMARY OSTEOARTHRITIS OF LEFT KNEE: Primary | ICD-10-CM

## 2020-03-04 DIAGNOSIS — M25.561 CHRONIC PAIN OF RIGHT KNEE: ICD-10-CM

## 2020-03-04 PROCEDURE — 73564 X-RAY EXAM KNEE 4 OR MORE: CPT | Mod: TC

## 2020-03-04 PROCEDURE — 20610 DRAIN/INJ JOINT/BURSA W/O US: CPT | Mod: LT | Performed by: ORTHOPAEDIC SURGERY

## 2020-03-04 PROCEDURE — 99214 OFFICE O/P EST MOD 30 MIN: CPT | Mod: 25 | Performed by: ORTHOPAEDIC SURGERY

## 2020-03-04 RX ORDER — TRIAMCINOLONE ACETONIDE 40 MG/ML
40 INJECTION, SUSPENSION INTRA-ARTICULAR; INTRAMUSCULAR ONCE
Status: COMPLETED | OUTPATIENT
Start: 2020-03-04 | End: 2020-03-04

## 2020-03-04 RX ORDER — DICLOFENAC SODIUM 75 MG/1
75 TABLET, DELAYED RELEASE ORAL 2 TIMES DAILY
Qty: 28 TABLET | Refills: 0 | Status: SHIPPED | OUTPATIENT
Start: 2020-03-04 | End: 2020-04-13

## 2020-03-04 RX ADMIN — TRIAMCINOLONE ACETONIDE 40 MG: 40 INJECTION, SUSPENSION INTRA-ARTICULAR; INTRAMUSCULAR at 15:40

## 2020-03-04 ASSESSMENT — MIFFLIN-ST. JEOR: SCORE: 2128.43

## 2020-03-04 ASSESSMENT — PAIN SCALES - GENERAL: PAINLEVEL: MILD PAIN (2)

## 2020-03-04 NOTE — PROGRESS NOTES
"Office Visit-Follow up    Chief Complaint: Amol Peres is a 54 year old male who is being seen for   Chief Complaint   Patient presents with     RECHECK     left knee primary osteoarthritis, right knee pain       History of Present Illness:   Today's visit:  Returns for bilateral knees.  Left is much worse than the right.  The right \"hurts just a little bit\".  Left has constant pain.  Particular in the medial aspect.  Associated with swelling.  When the knee pain flares he will get pain down his lower shin.  On his last visit he received an intra-articular steroid injection which provided about 6 weeks relief.  Is been taken ibuprofen 800 mg once a day.  Resting modifying activities.  No formal therapy.    2019 visit:  Patient previously saw Dr. Hill for his left knee - he had a steroid injection 2018, which helped until recently.  His right knee has also started bothering him now for the past six months.  Right knee feels the same as his left knee.  Mechanism of Injury: No trauma or inciting event.  Location: Left knee medial, right knee global  Duration of Pain:  Left knee started bothering him again early this month.  Right knee pain for about six months  Rating of Pain:  moderate.    Pain Quality: sharp  Pain is better with: Rest  Pain is worse with:  twisting, stairs  Treatment so far consists of:  Steroid injection last year on left side, which helped until recently, physical therapy, Ibuprofen.   Associated Features: None  Prior history of related problems:   The pain is getting worse.    Social History     Occupational History     Not on file   Tobacco Use     Smoking status: Former Smoker     Packs/day: 1.00     Last attempt to quit: 3/19/2018     Years since quittin.9     Smokeless tobacco: Never Used   Substance and Sexual Activity     Alcohol use: Yes     Comment: occasionally     Drug use: No     Sexual activity: Yes     Partners: Female       REVIEW OF SYSTEMS  General: " "negative for, night sweats, dizziness, fatigue  Resp: No shortness of breath and no cough  CV: negative for chest pain, syncope or near-syncope  GI: negative for nausea, vomiting and diarrhea  : negative for dysuria and hematuria  Musculoskeletal: as above  Neurologic: negative for syncope   Hematologic: negative for bleeding disorder    Physical Exam:  Vitals: /82   Ht 1.83 m (6' 0.05\")   Wt 126 kg (277 lb 11.2 oz)   BMI 37.61 kg/m    BMI= Body mass index is 37.61 kg/m .  Constitutional: healthy, alert and no acute distress   Psychiatric: mentation appears normal and affect normal/bright  NEURO: no focal deficits  RESP: Normal with easy respirations and no use of accessory muscles to breathe, no audible wheezing or retractions  CV: LLE:  no edema         Regular rate and rhythm by palpation  SKIN: No erythema, rashes, excoriation, or breakdown. No evidence of infection.   JOINT/EXTREMITIES:bilateral knees: Full range of motion.  Left knee: Some tenderness along medial joint line associated with a varus deformity.  That is correctable with valgus stressing.  Collateral ligaments are intact.  No focal bony tenderness.  No gross instability although some pseudolaxity medial.  GAIT: antalgic            Diagnostic Modalities:  left knee X-ray: No fractures or dislocations.  There is some moderate medial joint space narrowing.  There is a small rimming osteophyte along the lateral aspect of the medial femoral condyle.  right knee X-ray: No acute fractures or dislocations.  Medial lateral compartments appear to be well preserved.  No significant narrowing.  Independent visualization of the images was performed.      Impression: left knee primary osteoarthritis  Chronic right knee-probable chondromalacia    Plan:  All of the above pertinent physical exam and imaging modalities findings was reviewed with Amol.                                          CONSERVATIVE CARE:  I recommend conservative care for the " patient to include NSAIDs, focused self directed physical therapy, formal physical therapy, steroid injections. Today I provided or dispensed physical therapy.                                        INJECTION PROCEDURE:  The patient was counseled about an  injection, including discussion of risks (including infection), contents of the injection, rationale for performing the injection, and expected benefits of the injection. The skin was prepped with alcohol and betadine and then utilizing sterile technique an injection of the left knee joint from the anterolateral approach in the seated position was performed. The injection consisted 1ml of Kenalog (40mg per 1 ml) mixed with 3ml of 0.5% Marcaine. The patient tolerated the injection well, and there were no complications. The injection site was covered with a Band-Aid. The injection was performed by LAN Miguel                                        NSAIDS RISKS:  I have prescribed an antiinflammatory medication.  We discussed that it is the same class of some the common over the counter medications (Ibuprofen, Advil, Motrin, Aleve, Naproxen, and  Naprosyn). I recommend to avoid taking these OTC's medication when taking the medication that I prescribed. This medication should be stopped if having stomach issues, bleeding, high blood pressure and/or chest pain    Options discussed.  We also discussed weight loss.  The right knee is minimally symptomatic so would like to hold off treatment.    Return to clinic 4-6 , week(s), PRN, or sooner as needed for changes.  Re-x-ray on return: Cheri Quezada D.O.

## 2020-03-04 NOTE — LETTER
"    3/4/2020         RE: Amol Peres  [address]  [place] [address]        Dear Colleague,    Thank you for referring your patient, Amol Peres, to the [facility]. Please see a copy of my visit note below.    Amol to follow up with Primary Care provider regarding elevated blood pressure.    Clinic Administered Medication Documentation      Injection Documentation     Injection was administered by provider (please see MAR for given by information). Please see MAR and medication order for additional information.     Site: Joint injection   Medication Used: Kenalog 40mg    Expiration Date:  10/2021  The following medication was given by Bryn Arzate PA-C:     MEDICATION: Kenalog 40mg/1ml  ROUTE: Joint Injection  SITE: left knee  DOSE: 1 mL  LOT #: [id number]  : [employer]  EXPIRATION DATE:  10/2021  NDC: [address]-6                            Office Visit-Follow up    Chief Complaint: Amol Peres is a 54 year old male who is being seen for   Chief Complaint   Patient presents with     RECHECK     left knee primary osteoarthritis, right knee pain       History of Present Illness:   Today's visit:  Returns for bilateral knees.  Left is much worse than the right.  The right \"hurts just a little bit\".  Left has constant pain.  Particular in the medial aspect.  Associated with swelling.  When the knee pain flares he will get pain down his lower shin.  On his last visit he received an intra-articular steroid injection which provided about 6 weeks relief.  Is been taken ibuprofen 800 mg once a day.  Resting modifying activities.  No formal therapy.    April 18, 2019 visit:  Patient previously saw Dr. Hill for his left knee - he had a steroid injection April 2018, which helped until recently.  His right knee has also started bothering him now for the past six months.  Right knee feels the same as his left knee.  Mechanism of Injury: No trauma or inciting " "event.  Location: Left knee medial, right knee global  Duration of Pain:  Left knee started bothering him again early this month.  Right knee pain for about six months  Rating of Pain:  moderate.    Pain Quality: sharp  Pain is better with: Rest  Pain is worse with:  twisting, stairs  Treatment so far consists of:  Steroid injection last year on left side, which helped until recently, physical therapy, Ibuprofen.   Associated Features: None  Prior history of related problems:   The pain is getting worse.    Social History     Occupational History     Not on file   Tobacco Use     Smoking status: Former Smoker     Packs/day: 1.00     Last attempt to quit: 3/19/2018     Years since quittin.9     Smokeless tobacco: Never Used   Substance and Sexual Activity     Alcohol use: Yes     Comment: occasionally     Drug use: No     Sexual activity: Yes     Partners: Female       REVIEW OF SYSTEMS  General: negative for, night sweats, dizziness, fatigue  Resp: No shortness of breath and no cough  CV: negative for chest pain, syncope or near-syncope  GI: negative for nausea, vomiting and diarrhea  : negative for dysuria and hematuria  Musculoskeletal: as above  Neurologic: negative for syncope   Hematologic: negative for bleeding disorder    Physical Exam:  Vitals: /82   Ht 1.83 m (6' 0.05\")   Wt 126 kg (277 lb 11.2 oz)   BMI 37.61 kg/m     BMI= Body mass index is 37.61 kg/m .  Constitutional: healthy, alert and no acute distress   Psychiatric: mentation appears normal and affect normal/bright  NEURO: no focal deficits  RESP: Normal with easy respirations and no use of accessory muscles to breathe, no audible wheezing or retractions  CV: LLE:  no edema         Regular rate and rhythm by palpation  SKIN: No erythema, rashes, excoriation, or breakdown. No evidence of infection.   JOINT/EXTREMITIES:bilateral knees: Full range of motion.  Left knee: Some tenderness along medial joint line associated with a varus " deformity.  That is correctable with valgus stressing.  Collateral ligaments are intact.  No focal bony tenderness.  No gross instability although some pseudolaxity medial.  GAIT: antalgic            Diagnostic Modalities:  left knee X-ray: No fractures or dislocations.  There is some moderate medial joint space narrowing.  There is a small rimming osteophyte along the lateral aspect of the medial femoral condyle.  right knee X-ray: No acute fractures or dislocations.  Medial lateral compartments appear to be well preserved.  No significant narrowing.  Independent visualization of the images was performed.      Impression: left knee primary osteoarthritis  Chronic right knee-probable chondromalacia    Plan:  All of the above pertinent physical exam and imaging modalities findings was reviewed with Amol.                                          CONSERVATIVE CARE:  I recommend conservative care for the patient to include NSAIDs, focused self directed physical therapy, formal physical therapy, steroid injections. Today I provided or dispensed physical therapy.                                        INJECTION PROCEDURE:  The patient was counseled about an  injection, including discussion of risks (including infection), contents of the injection, rationale for performing the injection, and expected benefits of the injection. The skin was prepped with alcohol and betadine and then utilizing sterile technique an injection of the left knee joint from the anterolateral approach in the seated position was performed. The injection consisted 1ml of Kenalog (40mg per 1 ml) mixed with 3ml of 0.5% Marcaine. The patient tolerated the injection well, and there were no complications. The injection site was covered with a Band-Aid. The injection was performed by LAN Miguel                                        NSAIDS RISKS:  I have prescribed an antiinflammatory medication.  We discussed that it is the same class of some the  common over the counter medications (Ibuprofen, Advil, Motrin, Aleve, Naproxen, and  Naprosyn). I recommend to avoid taking these OTC's medication when taking the medication that I prescribed. This medication should be stopped if having stomach issues, bleeding, high blood pressure and/or chest pain    Options discussed.  We also discussed weight loss.  The right knee is minimally symptomatic so would like to hold off treatment.    Return to clinic 4-6 , week(s), PRN, or sooner as needed for changes.  Re-x-ray on return: No    Richard Quezada D.O.          Again, thank you for allowing me to participate in the care of your patient.        Sincerely,        Joel Quezada, DO

## 2020-04-13 ENCOUNTER — TELEPHONE (OUTPATIENT)
Dept: ORTHOPEDICS | Facility: OTHER | Age: 55
End: 2020-04-13

## 2020-04-13 DIAGNOSIS — M17.12 PRIMARY OSTEOARTHRITIS OF LEFT KNEE: ICD-10-CM

## 2020-04-13 RX ORDER — DICLOFENAC SODIUM 75 MG/1
75 TABLET, DELAYED RELEASE ORAL 2 TIMES DAILY
Qty: 28 TABLET | Refills: 0 | Status: SHIPPED | OUTPATIENT
Start: 2020-04-13 | End: 2020-11-06

## 2020-04-13 NOTE — TELEPHONE ENCOUNTER
Phone call to patient to see when he had stopped working for his form.   Patient was unable to work starting July or August of last year due to his job duties being going up and down ladders and kneeling.   Patient was working for a [employer] company and they did not have a different job for him to do that would allow him to be off of his knee.       Patient is also requesting a refill of his Diclofenac he states that he has been taking it when his knee is flaring up.     Refill request received via phone by patient or caregiver   [id number] - [place], [place] - [address]       Pending Prescriptions:                       Disp   Refills    diclofenac (VOLTAREN) 75 MG EC tablet     28 tab*0            Sig: Take 1 tablet (75 mg) by mouth 2 times daily         Last Written Prescription Date:  03/04/2020  Last Fill Quantity: 28,   # refills: 0  Last Office Visit with prescribing provider: 03/04/2020  Future Office visit: Non scheduled.         Routing to provider refill is ready to be sent if appropriate. And form is partially completed .

## 2020-04-13 NOTE — TELEPHONE ENCOUNTER
Reason for Call:  Other call back and form for unemployment / disability    Detailed comments: Patient called and will be dropping off a form for unemployment that needs to be filled out by Dr. Quezada's team.  He asked to have his nurse call when filling it out.  Thanks    Phone Number Patient can be reached at: Home number on file [phone] (home)    Best Time: any    Can we leave a detailed message on this number? YES    Call taken on 4/13/2020 at 7:57 AM by Tierra Gruber

## 2020-04-13 NOTE — TELEPHONE ENCOUNTER
Form completed to the best my ability as we never discussed work in the past.    Prescription refilled

## 2020-04-13 NOTE — TELEPHONE ENCOUNTER
Informed patient that the form has been faxed to the number provided on form. Original has been mailed to the patient and a copy has been placed to be scanned as well as in the faxed drawer.   Closing encounter.   Yumiko Peters CMA on 4/13/2020 at 12:31 PM

## 2020-08-14 ENCOUNTER — OFFICE VISIT (OUTPATIENT)
Dept: ORTHOPEDICS | Facility: CLINIC | Age: 55
End: 2020-08-14
Payer: COMMERCIAL

## 2020-08-14 VITALS
DIASTOLIC BLOOD PRESSURE: 78 MMHG | BODY MASS INDEX: 38.33 KG/M2 | SYSTOLIC BLOOD PRESSURE: 138 MMHG | HEIGHT: 72 IN | WEIGHT: 283 LBS

## 2020-08-14 DIAGNOSIS — M17.12 PRIMARY OSTEOARTHRITIS OF LEFT KNEE: Primary | ICD-10-CM

## 2020-08-14 PROCEDURE — 20610 DRAIN/INJ JOINT/BURSA W/O US: CPT | Mod: LT | Performed by: PHYSICIAN ASSISTANT

## 2020-08-14 RX ORDER — TRIAMCINOLONE ACETONIDE 40 MG/ML
40 INJECTION, SUSPENSION INTRA-ARTICULAR; INTRAMUSCULAR ONCE
Status: COMPLETED | OUTPATIENT
Start: 2020-08-14 | End: 2020-08-14

## 2020-08-14 RX ADMIN — TRIAMCINOLONE ACETONIDE 40 MG: 40 INJECTION, SUSPENSION INTRA-ARTICULAR; INTRAMUSCULAR at 15:21

## 2020-08-14 ASSESSMENT — PAIN SCALES - GENERAL: PAINLEVEL: MODERATE PAIN (5)

## 2020-08-14 ASSESSMENT — MIFFLIN-ST. JEOR: SCORE: 2146.68

## 2020-08-14 NOTE — PROGRESS NOTES
Prior to injection, verified patient identity using patient's name and date of birth.  Due to injection administration, patient instructed to remain in clinic for 15 minutes  afterwards, and to report any adverse reaction to me immediately.    Joint injection was performed.      Drug Amount Wasted:  None.  Vial/Syringe: Single dose vial  : [employer]  EXPIRATION DATE:  3/2022  NDC: [address]-2    Lot    [id number]  Left knee  Bryn Arzate PA-C

## 2020-08-14 NOTE — LETTER
8/14/2020         RE: Amol Peres  [address]  [facility] [address]        Dear Colleague,    Thank you for referring your patient, Amol Peres, to the [facility]. Please see a copy of my visit note below.    Office Visit-Follow up    Chief Complaint: Amol Peres is a 55 year old male who is being seen for   Chief Complaint   Patient presents with     RECHECK     left knee, wants inj lov with inj 3/4/20       History of Present Illness:   Patient is here in follow-up cortisone injection.  Patient's last cortisone injection was 3/4/2020 and he received about 5 months of relief with that.  2 weeks ago it started to wear off.  Last night he had difficulty sleeping.  Patient does not recall getting full tearing much for at his last visit with Dr. Quezada.  We did discuss total knee arthroplasty as mentioned below.    Physical Exam:  Vitals: /78   Ht 1.829 m (6')   Wt 128.4 kg (283 lb)   BMI 38.38 kg/m    BMI= Body mass index is 38.38 kg/m .  Constitutional: healthy, alert and no acute distress   Psychiatric: mentation appears normal and affect normal/bright  NEURO: no focal deficits, CMS intact left lower extremity   RESP: Normal with easy respirations and no use of accessory muscles to breathe, no audible wheezing or retractions  CV: Calf soft and nontender to palpation, leg warm   SKIN: No erythema, rashes, excoriation, or breakdown. No evidence of infection.   MUSCULOSKELETAL:    INSPECTION of left  knee: No gross deformities, erythema, edema, ecchymosis, atrophy or fasciculations.     PALPATION: No tenderness on palpation of the medial, lateral, anterior and posterior portion of the knee. No specific joint line tenderness. No increased warmth.  No effusion.     ROM: Able to flex and extend without any catching or locking or pain.    STRENGTH: Able to get on the exam table and off without any problems and able to ambulate without any problems.    SPECIAL TEST: None  today.   GAIT: non-antalgic when he arrived but slight antalgic gait on the left when he left after the injection.  Lymph: no palpable lymph nodes    Impression: 1.  Left knee degenerative joint disease, moderate medial.    Plan:                                          INJECTION PROCEDURE:  The patient was counseled about an  injection, including discussion of risks (including infection), contents of the injection, rationale for performing the injection, and expected benefits of the injection. The skin was prepped with alcohol and betadine and then utilizing sterile technique an injection of the left knee joint from the anterolateral approach in the seated position was performed. I used dang chloride spray prior to doing the injection. The injection consisted 1ml of Kenalog (40mg per 1ml) with 8ml 1% lidocaine plain. The patient tolerated the injection well, and there were no complications. The injection site was covered with a Band-Aid. The injection was performed by Quincy Arzate PA-C     FOCUSED PLAN:    Patient received approximately 5 months of relief from the last left knee cortisone injection which was done on 3/4/2020.  Patient would like another cortisone injection I am okay with that as we injected him today.  Patient is interested in left total knee arthroplasty with Dr. Quezada.  His BMI is under 40.  He is planning on doing the surgery right after [place].  I educated him to get into see Dr. Quezada sooner than later to get on the schedule.  He plans on seeing Dr. Quezada early November or even earlier to set up a surgical date.  Follow-up on an as-needed basis.    Re-x-ray on return: No    BP Readings from Last 1 Encounters:   08/14/20 138/78       BP noted to be well controlled today in office.      Patient does not use Tobacco products.      This note was dictated with [employer].    Bryn Arzate PA-C        Prior to injection, verified patient identity using patient's name and date of birth.  Due to  injection administration, patient instructed to remain in clinic for 15 minutes  afterwards, and to report any adverse reaction to me immediately.    Joint injection was performed.      Drug Amount Wasted:  None.  Vial/Syringe: Single dose vial  : [employer]  EXPIRATION DATE:  3/2022  NDC: [address]-9    Lot    [id number]  Left knee  Bryn Arzate PA-C       Again, thank you for allowing me to participate in the care of your patient.        Sincerely,        Bryn Arzate PA-C

## 2020-08-14 NOTE — PROGRESS NOTES
Office Visit-Follow up    Chief Complaint: Amol Peres is a 55 year old male who is being seen for   Chief Complaint   Patient presents with     RECHECK     left knee, wants inj lov with inj 3/4/20       History of Present Illness:   Patient is here in follow-up cortisone injection.  Patient's last cortisone injection was 3/4/2020 and he received about 5 months of relief with that.  2 weeks ago it started to wear off.  Last night he had difficulty sleeping.  Patient does not recall getting full tearing much for at his last visit with Dr. Quezada.  We did discuss total knee arthroplasty as mentioned below.    Physical Exam:  Vitals: /78   Ht 1.829 m (6')   Wt 128.4 kg (283 lb)   BMI 38.38 kg/m    BMI= Body mass index is 38.38 kg/m .  Constitutional: healthy, alert and no acute distress   Psychiatric: mentation appears normal and affect normal/bright  NEURO: no focal deficits, CMS intact left lower extremity   RESP: Normal with easy respirations and no use of accessory muscles to breathe, no audible wheezing or retractions  CV: Calf soft and nontender to palpation, leg warm   SKIN: No erythema, rashes, excoriation, or breakdown. No evidence of infection.   MUSCULOSKELETAL:    INSPECTION of left  knee: No gross deformities, erythema, edema, ecchymosis, atrophy or fasciculations.     PALPATION: No tenderness on palpation of the medial, lateral, anterior and posterior portion of the knee. No specific joint line tenderness. No increased warmth.  No effusion.     ROM: Able to flex and extend without any catching or locking or pain.    STRENGTH: Able to get on the exam table and off without any problems and able to ambulate without any problems.    SPECIAL TEST: None today.   GAIT: non-antalgic when he arrived but slight antalgic gait on the left when he left after the injection.  Lymph: no palpable lymph nodes    Impression: 1.  Left knee degenerative joint disease, moderate medial.    Plan:                                           INJECTION PROCEDURE:  The patient was counseled about an  injection, including discussion of risks (including infection), contents of the injection, rationale for performing the injection, and expected benefits of the injection. The skin was prepped with alcohol and betadine and then utilizing sterile technique an injection of the left knee joint from the anterolateral approach in the seated position was performed. I used dang chloride spray prior to doing the injection. The injection consisted 1ml of Kenalog (40mg per 1ml) with 8ml 1% lidocaine plain. The patient tolerated the injection well, and there were no complications. The injection site was covered with a Band-Aid. The injection was performed by Quincy Arzate PA-C     FOCUSED PLAN:    Patient received approximately 5 months of relief from the last left knee cortisone injection which was done on 3/4/2020.  Patient would like another cortisone injection I am okay with that as we injected him today.  Patient is interested in left total knee arthroplasty with Dr. Quezada.  His BMI is under 40.  He is planning on doing the surgery right after [place].  I educated him to get into see Dr. Quezada sooner than later to get on the schedule.  He plans on seeing Dr. Quezada early November or even earlier to set up a surgical date.  Follow-up on an as-needed basis.    Re-x-ray on return: No    BP Readings from Last 1 Encounters:   08/14/20 138/78       BP noted to be well controlled today in office.      Patient does not use Tobacco products.      This note was dictated with [employer].    Bryn Arzate PA-C

## 2020-09-11 ENCOUNTER — TELEPHONE (OUTPATIENT)
Dept: FAMILY MEDICINE | Facility: OTHER | Age: 55
End: 2020-09-11

## 2020-09-11 DIAGNOSIS — Z12.11 SPECIAL SCREENING FOR MALIGNANT NEOPLASMS, COLON: Primary | ICD-10-CM

## 2020-09-11 NOTE — TELEPHONE ENCOUNTER
Summary:    Patient is due/failing the following:   LDL    Action needed:   Patient needs fasting lab only appointment    Type of outreach:    Phone, left message for patient to call back.     Questions for provider review:    None                                                                                                                                    Colette Serrano CMA       Chart routed to Care Team .          Panel Management Review      Patient has the following on his problem list:     Hypertension   Last three blood pressure readings:  BP Readings from Last 3 Encounters:   08/14/20 138/78   03/04/20 127/82   02/25/20 116/68     Blood pressure: Passed    HTN Guidelines:  Less than 140/90      Composite cancer screening  Chart review shows that this patient is due/due soon for the following None

## 2020-09-28 ENCOUNTER — TELEPHONE (OUTPATIENT)
Dept: FAMILY MEDICINE | Facility: OTHER | Age: 55
End: 2020-09-28

## 2020-09-28 NOTE — LETTER
[facility] [address] [address]  [phone]      October 7, 2020      Amol Peres  [address]  [place] [address]      Dear Amol:     To better serve you, we are sending this letter to notify you that we have attempted to contact you by telephone to schedule the following procedure(s) ordered by your physician.     ___x____   Colonoscopy   _______   Upper GI Endoscopy (EGD)   _______   Colonoscopy and Upper GI Endoscopy    To provide the highest quality of care, we strongly encourage you to call and schedule the prescribed test/procedure at your earliest convenience.   The number to the Specialty Scheduling department is [phone] and the hours are 8:00am - 4:30pm Monday through Friday.   We look forward to hearing from you.    Sincerely,    [facility]

## 2020-09-28 NOTE — TELEPHONE ENCOUNTER
Left message for patient to return call to schedule EGD/colonoscopy. If Kaitlynn or Nicole are not available, please transfer to same day surgery

## 2020-10-07 NOTE — TELEPHONE ENCOUNTER
.Left message for patient to return call to schedule EGD/colonoscopy. If Kaitlynn or Nicole are not available, please transfer to same day surgery        x2 letter mailed

## 2020-10-23 ENCOUNTER — TELEPHONE (OUTPATIENT)
Dept: FAMILY MEDICINE | Facility: CLINIC | Age: 55
End: 2020-10-23

## 2020-10-23 ENCOUNTER — OFFICE VISIT (OUTPATIENT)
Dept: FAMILY MEDICINE | Facility: CLINIC | Age: 55
End: 2020-10-23
Payer: COMMERCIAL

## 2020-10-23 VITALS
TEMPERATURE: 97.8 F | HEIGHT: 73 IN | DIASTOLIC BLOOD PRESSURE: 82 MMHG | RESPIRATION RATE: 12 BRPM | BODY MASS INDEX: 36.84 KG/M2 | SYSTOLIC BLOOD PRESSURE: 130 MMHG | OXYGEN SATURATION: 99 % | HEART RATE: 66 BPM | WEIGHT: 278 LBS

## 2020-10-23 DIAGNOSIS — M54.50 ACUTE MIDLINE LOW BACK PAIN WITHOUT SCIATICA: ICD-10-CM

## 2020-10-23 DIAGNOSIS — R10.9 RIGHT FLANK PAIN: Primary | ICD-10-CM

## 2020-10-23 PROCEDURE — 99213 OFFICE O/P EST LOW 20 MIN: CPT | Performed by: FAMILY MEDICINE

## 2020-10-23 RX ORDER — NAPROXEN 500 MG/1
500 TABLET ORAL 2 TIMES DAILY WITH MEALS
Qty: 30 TABLET | Refills: 0 | Status: ON HOLD | OUTPATIENT
Start: 2020-10-23 | End: 2021-01-13

## 2020-10-23 ASSESSMENT — MIFFLIN-ST. JEOR: SCORE: 2139.88

## 2020-10-23 ASSESSMENT — PAIN SCALES - GENERAL: PAINLEVEL: SEVERE PAIN (7)

## 2020-10-23 NOTE — PATIENT INSTRUCTIONS
Patient Education     Back Basics: A Healthy Spine    A healthy spine supports the body while letting it move freely. It does this with the help of three natural curves. Strong, flexible muscles help, too. They support the spine by keeping its curves properly aligned. The disks that cushion the bones of your spine also play a role in back fitness.  Three natural curves  The spine is made of bones (vertebrae) and pads of soft tissue (disks). These parts are arranged in three curves: cervical, thoracic, and lumbar. When properly aligned, these curves keep your body balanced. They also support your body when you move. By distributing your weight throughout your spine, the curves make back injuries less likely.  Strong, flexible muscles  Strong, flexible back muscles help support the three curves of the spine. They do so by holding the vertebrae and disks in proper alignment. Strong, flexible abdominal, hip, and leg muscles also reduce strain on the back.  The lumbar curve  The lumbar curve is the hardest-working part of the spine. It carries more weight and moves the most. Aligning this curve helps prevent damage to vertebrae, disks, and other parts of the spine.  Cushioning disks  Disks are the soft pads of tissue between the vertebrae. The disks absorb shock caused by movement. Each disk has a spongy center (nucleus) and a tougher outer ring (annulus). Movement within the nucleus allows the vertebrae to rock back and forth on the disks. This provides the flexibility needed to bend and move.    Date Last Reviewed: 5/1/2018 2000-2019 [employer]. [address] [place] [address], [place] [id number]. All rights reserved. This information is not intended as a substitute for professional medical care. Always follow your healthcare professional's instructions.

## 2020-10-23 NOTE — PROGRESS NOTES
"Subjective     Amol Peres is a 55 year old male who presents to clinic today for the following health issues:    HPI         Back Pain  Onset/Duration: 2 WEEKS   Description:   Location of pain: low back right  Character of pain: sharp and constant  Pain radiation: none  New numbness or weakness in legs, not attributed to pain: no   Intensity: Currently 6/10, At its worst 10/10  Progression of Symptoms: worsening and constant  History:   Specific cause: turning/bending  Pain interferes with job: YES  History of back problems: no prior back problems  Any previous MRI or X-rays: None  Sees a specialist for back pain: No  Alleviating factors:   Improved by: NSAIDs    Precipitating factors:  Worsened by: Lifting, Bending, Standing, Sitting and Walking  Therapies tried and outcome: IBUPROFIN    Accompanying Signs & Symptoms:  Risk of Fracture: None  Risk of Cauda Equina: None  Risk of Infection: None  Risk of Cancer: None  Risk of Ankylosing Spondylitis: Onset at age <35, male, AND morning back stiffness no      Review of Systems   Constitutional, HEENT, cardiovascular, pulmonary, gi and gu systems are negative, except as otherwise noted.      Objective    /82   Pulse 66   Temp 97.8  F (36.6  C)   Resp 12   Ht 1.854 m (6' 1\")   Wt 126.1 kg (278 lb)   SpO2 99%   BMI 36.68 kg/m    Body mass index is 36.68 kg/m .     Physical Exam   GENERAL: healthy, alert and no distress  RESP: lungs clear to auscultation - no rales, rhonchi or wheezes  CV: regular rate and rhythm, normal S1 S2, no S3 or S4, no murmur, click or rub, no peripheral edema and peripheral pulses strong  MS: no gross musculoskeletal defects noted, no edema  Comprehensive back pain exam:  Tenderness of Midline low back, Pain limits the following motions: Extension, Lower extremity strength functional and equal on both sides, Lower extremity reflexes within normal limits bilaterally, Lower extremity sensation normal and equal on both sides and " "Straight leg raise negative bilaterally  PSYCH: mentation appears normal, affect normal/bright  Pinpoint tenderness right abdominal oblique            Assessment & Plan     Right flank pain  55-year-old male with right anterior flank pain, started approximately 2 weeks ago when lifting and twisting.  On exam he does have pinpoint superficial tenderness, most likely consistent with a strain of the abdominal oblique.  Recommended ice, rest, naproxen, follow-up no improvement or any worsening.  - naproxen (NAPROSYN) 500 MG tablet; Take 1 tablet (500 mg) by mouth 2 times daily (with meals)    Acute midline low back pain without sciatica  When examining the back he did have some midline tenderness approximately L3, no history of trauma, no previous complaints of back pain, unrelated to chief complaint.  Given tenderness will evaluate with x-ray.  - XR Lumbar Spine 2/3 Views; Future     BMI:   Estimated body mass index is 36.68 kg/m  as calculated from the following:    Height as of this encounter: 1.854 m (6' 1\").    Weight as of this encounter: 126.1 kg (278 lb).              Return in about 2 weeks (around 11/6/2020) for Flu shot.    Macario Arango MD  [facility]    "

## 2020-11-04 ENCOUNTER — OFFICE VISIT (OUTPATIENT)
Dept: ORTHOPEDICS | Facility: CLINIC | Age: 55
End: 2020-11-04
Payer: COMMERCIAL

## 2020-11-04 ENCOUNTER — HOSPITAL ENCOUNTER (OUTPATIENT)
Dept: GENERAL RADIOLOGY | Facility: CLINIC | Age: 55
Discharge: HOME | End: 2020-11-04
Attending: FAMILY MEDICINE | Admitting: FAMILY MEDICINE
Payer: COMMERCIAL

## 2020-11-04 VITALS
DIASTOLIC BLOOD PRESSURE: 70 MMHG | BODY MASS INDEX: 36.91 KG/M2 | HEIGHT: 73 IN | WEIGHT: 278.5 LBS | SYSTOLIC BLOOD PRESSURE: 124 MMHG

## 2020-11-04 DIAGNOSIS — E78.5 HYPERLIPIDEMIA LDL GOAL <130: ICD-10-CM

## 2020-11-04 DIAGNOSIS — M54.50 ACUTE MIDLINE LOW BACK PAIN WITHOUT SCIATICA: ICD-10-CM

## 2020-11-04 DIAGNOSIS — M25.562 CHRONIC PAIN OF LEFT KNEE: ICD-10-CM

## 2020-11-04 DIAGNOSIS — M17.12 PRIMARY OSTEOARTHRITIS OF LEFT KNEE: Primary | ICD-10-CM

## 2020-11-04 DIAGNOSIS — G89.29 CHRONIC PAIN OF LEFT KNEE: ICD-10-CM

## 2020-11-04 LAB
CHOLEST SERPL-MCNC: 192 MG/DL
HDLC SERPL-MCNC: 43 MG/DL
LDLC SERPL CALC-MCNC: 122 MG/DL
NONHDLC SERPL-MCNC: 149 MG/DL
TRIGL SERPL-MCNC: 135 MG/DL

## 2020-11-04 PROCEDURE — 80061 LIPID PANEL: CPT | Performed by: PHYSICIAN ASSISTANT

## 2020-11-04 PROCEDURE — 72100 X-RAY EXAM L-S SPINE 2/3 VWS: CPT

## 2020-11-04 PROCEDURE — 36415 COLL VENOUS BLD VENIPUNCTURE: CPT | Performed by: PHYSICIAN ASSISTANT

## 2020-11-04 PROCEDURE — 99213 OFFICE O/P EST LOW 20 MIN: CPT | Performed by: ORTHOPAEDIC SURGERY

## 2020-11-04 ASSESSMENT — PAIN SCALES - GENERAL: PAINLEVEL: EXTREME PAIN (8)

## 2020-11-04 ASSESSMENT — MIFFLIN-ST. JEOR: SCORE: 2142.15

## 2020-11-04 NOTE — PROGRESS NOTES
"Office Visit-Follow up    Chief Complaint: Amol Peres is a 55 year old male who is being seen for   Chief Complaint   Patient presents with     RECHECK     Left knee degenerative joint disease, moderate medial       History of Present Illness:   Today's visit:  He was seen by Quincy Arzate August 2020 and received a left knee steroid injection. States no improvement after the last injection. The left knee continues to get progressively worse.  Constant moderate to severe pain. Currently 8/10. Waking him at night, and keeping him up, struggling with activity, walking, and work due to pain. Is a . Unable to kneel to pain anymore.   Treatments tried: multiple steroid injection, rest, NSAIDs, activity modification, time, physical therapy.  March 4, 2020 visit:  Returns for bilateral knees.  Left is much worse than the right.  The right \"hurts just a little bit\".  Left has constant pain.  Particular in the medial aspect.  Associated with swelling.  When the knee pain flares he will get pain down his lower shin.  On his last visit he received an intra-articular steroid injection which provided about 6 weeks relief.  Is been taken ibuprofen 800 mg once a day.  Resting modifying activities.  No formal therapy.   April 18, 2019 visit:  Patient previously saw Dr. Hill for his left knee - he had a steroid injection April 2018, which helped until recently.  His right knee has also started bothering him now for the past six months.  Right knee feels the same as his left knee.  Mechanism of Injury: No trauma or inciting event.  Location: Left knee medial, right knee global  Duration of Pain:  Left knee started bothering him again early this month.  Right knee pain for about six months  Rating of Pain:  moderate.    Pain Quality: sharp  Pain is better with: Rest  Pain is worse with:  twisting, stairs  Treatment so far consists of:  Steroid injection last year on left side, which helped until recently, physical therapy, " "Ibuprofen.   Associated Features: None  Prior history of related problems:   The pain is getting worse.    Social History     Occupational History     Not on file   Tobacco Use     Smoking status: Current Every Day Smoker     Packs/day: 1.00     Smokeless tobacco: Never Used   Substance and Sexual Activity     Alcohol use: Yes     Comment: occasionally     Drug use: No     Sexual activity: Yes     Partners: Female       REVIEW OF SYSTEMS  General: negative for, night sweats, dizziness, fatigue  Resp: No shortness of breath and no cough  CV: negative for chest pain, syncope or near-syncope  GI: negative for nausea, vomiting and diarrhea  : negative for dysuria and hematuria  Musculoskeletal: as above  Neurologic: negative for syncope   Hematologic: negative for bleeding disorder    Physical Exam:  Vitals: /70   Ht 1.854 m (6' 1\")   Wt 126.3 kg (278 lb 8 oz)   BMI 36.74 kg/m    BMI= Body mass index is 36.74 kg/m .  Constitutional: healthy, alert and no acute distress   Psychiatric: mentation appears normal and affect normal/bright  NEURO: no focal deficits  RESP: Normal with easy respirations and no use of accessory muscles to breathe, no audible wheezing or retractions  CV: LLE:  no edema         Regular rate and rhythm by palpation  SKIN: No erythema, rashes, excoriation, or breakdown. No evidence of infection.   JOINT/EXTREMITIES:left knee: mild effusion. Medial joint line tenderness. AROM 0-105. Extensor intact. No instability with valgus or varus.    GAIT: not tested             Diagnostic Modalities:  None today.  Previous imaging reviewed.  Independent visualization of the images was performed.      Impression: left knee chronic pain - medial primary osteoarthritis versus medial meniscus tear.    Plan:  All of the above pertinent physical exam and imaging modalities findings was reviewed with Amol and his spouse.  His symptoms continue to worsen and has failed extensive conservative therapy. Does " have moderate narrowing medially but otherwise the knee is well preserved.  Discussed options. Given his age and otherwise preserved joint spaces recommended MRI to ensure no medial meniscus tear as well full evaluation of cartilage to all three compartments.  Depending on results could discuss surgical options.     Return to clinic to discuss test results, or sooner as needed for changes.  Re-x-ray on return: No    Scribed by:  DARLEEN Hoyos, CNP  3:11 PM  11/4/2020  The information in this document, created by a scribe for me, accurately reflects the services I personally performed and the decisions made by me. I have reviewed and approved this document for accuracy    Joel Quezada, DO

## 2020-11-04 NOTE — LETTER
"    11/4/2020         RE: Amol Peres  [address]  [place] [address]        Dear Colleague,    Thank you for referring your patient, Amol Peres, to the [facility]. Please see a copy of my visit note below.    Office Visit-Follow up    Chief Complaint: Amol Peres is a 55 year old male who is being seen for   Chief Complaint   Patient presents with     RECHECK     Left knee degenerative joint disease, moderate medial       History of Present Illness:   Today's visit:  He was seen by Quincy Arzate August 2020 and received a left knee steroid injection. States no improvement after the last injection. The left knee continues to get progressively worse.  Constant moderate to severe pain. Currently 8/10. Waking him at night, and keeping him up, struggling with activity, walking, and work due to pain. Is a . Unable to kneel to pain anymore.   Treatments tried: multiple steroid injection, rest, NSAIDs, activity modification, time, physical therapy.  March 4, 2020 visit:  Returns for bilateral knees.  Left is much worse than the right.  The right \"hurts just a little bit\".  Left has constant pain.  Particular in the medial aspect.  Associated with swelling.  When the knee pain flares he will get pain down his lower shin.  On his last visit he received an intra-articular steroid injection which provided about 6 weeks relief.  Is been taken ibuprofen 800 mg once a day.  Resting modifying activities.  No formal therapy.   April 18, 2019 visit:  Patient previously saw Dr. Hill for his left knee - he had a steroid injection April 2018, which helped until recently.  His right knee has also started bothering him now for the past six months.  Right knee feels the same as his left knee.  Mechanism of Injury: No trauma or inciting event.  Location: Left knee medial, right knee global  Duration of Pain:  Left knee started bothering him again early this month.  Right knee pain for " "about six months  Rating of Pain:  moderate.    Pain Quality: sharp  Pain is better with: Rest  Pain is worse with:  twisting, stairs  Treatment so far consists of:  Steroid injection last year on left side, which helped until recently, physical therapy, Ibuprofen.   Associated Features: None  Prior history of related problems:   The pain is getting worse.    Social History     Occupational History     Not on file   Tobacco Use     Smoking status: Current Every Day Smoker     Packs/day: 1.00     Smokeless tobacco: Never Used   Substance and Sexual Activity     Alcohol use: Yes     Comment: occasionally     Drug use: No     Sexual activity: Yes     Partners: Female       REVIEW OF SYSTEMS  General: negative for, night sweats, dizziness, fatigue  Resp: No shortness of breath and no cough  CV: negative for chest pain, syncope or near-syncope  GI: negative for nausea, vomiting and diarrhea  : negative for dysuria and hematuria  Musculoskeletal: as above  Neurologic: negative for syncope   Hematologic: negative for bleeding disorder    Physical Exam:  Vitals: /70   Ht 1.854 m (6' 1\")   Wt 126.3 kg (278 lb 8 oz)   BMI 36.74 kg/m    BMI= Body mass index is 36.74 kg/m .  Constitutional: healthy, alert and no acute distress   Psychiatric: mentation appears normal and affect normal/bright  NEURO: no focal deficits  RESP: Normal with easy respirations and no use of accessory muscles to breathe, no audible wheezing or retractions  CV: LLE:  no edema         Regular rate and rhythm by palpation  SKIN: No erythema, rashes, excoriation, or breakdown. No evidence of infection.   JOINT/EXTREMITIES:left knee: mild effusion. Medial joint line tenderness. AROM 0-105. Extensor intact. No instability with valgus or varus.    GAIT: not tested             Diagnostic Modalities:  None today.  Previous imaging reviewed.  Independent visualization of the images was performed.      Impression: left knee chronic pain - medial primary " osteoarthritis versus medial meniscus tear.    Plan:  All of the above pertinent physical exam and imaging modalities findings was reviewed with Amol and his spouse.  His symptoms continue to worsen and has failed extensive conservative therapy. Does have moderate narrowing medially but otherwise the knee is well preserved.  Discussed options. Given his age and otherwise preserved joint spaces recommended MRI to ensure no medial meniscus tear as well full evaluation of cartilage to all three compartments.  Depending on results could discuss surgical options.     Return to clinic to discuss test results, or sooner as needed for changes.  Re-x-ray on return: No    Scribed by:  DARLEEN Hoyos, CNP  3:11 PM  11/4/2020  The information in this document, created by a scribe for me, accurately reflects the services I personally performed and the decisions made by me. I have reviewed and approved this document for accuracy    Joel Quezada DO             Again, thank you for allowing me to participate in the care of your patient.        Sincerely,        Joel Quezada DO

## 2020-11-05 ENCOUNTER — TELEPHONE (OUTPATIENT)
Dept: FAMILY MEDICINE | Facility: CLINIC | Age: 55
End: 2020-11-05

## 2020-11-05 DIAGNOSIS — M54.50 ACUTE MIDLINE LOW BACK PAIN WITHOUT SCIATICA: Primary | ICD-10-CM

## 2020-11-05 RX ORDER — NAPROXEN 500 MG/1
500 TABLET ORAL 2 TIMES DAILY WITH MEALS
Qty: 60 TABLET | Refills: 0 | Status: SHIPPED | OUTPATIENT
Start: 2020-11-05 | End: 2020-11-06

## 2020-11-05 RX ORDER — CYCLOBENZAPRINE HCL 10 MG
10 TABLET ORAL 3 TIMES DAILY PRN
Qty: 30 TABLET | Refills: 0 | Status: ON HOLD | OUTPATIENT
Start: 2020-11-05 | End: 2021-01-13

## 2020-11-05 NOTE — RESULT ENCOUNTER NOTE
Please advise patient that his back x-ray showed degenerative or arthritic changes.  Continue with previously discussed plan of follow-up as needed.    Macario Arango MD, FAAFP  Family Medicine Physician  [facility] [place]  [address] [place] [address]

## 2020-11-05 NOTE — TELEPHONE ENCOUNTER
Please advise patient that I have placed a consult for physical therapy and chiropractic care.  Please give him number to schedule that appointment.  I have also ordered an MRI, please schedule it for him.  In the meantime continue the naproxen, add acetaminophen scheduled daily and follow-up in clinic if no improvement or any worsening early next week.    Macario Arango MD, FAAFP  Family Medicine Physician  [facility] [place]  [address], [place] [address]

## 2020-11-05 NOTE — TELEPHONE ENCOUNTER
Patient informed of Dr. Arango's message below. He stated pain meds do not touch the pain. What is his next step for pain treatment?   Provider please advise.                   Please advise patient that his back x-ray showed degenerative or arthritic changes.  Continue with previously discussed plan of follow-up as needed.     Macario Arango MD, FAAFP   Family Medicine Physician   [facility] [place]   [address] [place] [address]

## 2020-11-06 ENCOUNTER — TELEPHONE (OUTPATIENT)
Dept: FAMILY MEDICINE | Facility: CLINIC | Age: 55
End: 2020-11-06

## 2020-11-06 ENCOUNTER — OFFICE VISIT (OUTPATIENT)
Dept: FAMILY MEDICINE | Facility: OTHER | Age: 55
End: 2020-11-06
Payer: COMMERCIAL

## 2020-11-06 VITALS
DIASTOLIC BLOOD PRESSURE: 62 MMHG | BODY MASS INDEX: 37.11 KG/M2 | TEMPERATURE: 98.1 F | HEART RATE: 80 BPM | OXYGEN SATURATION: 97 % | HEIGHT: 72 IN | SYSTOLIC BLOOD PRESSURE: 130 MMHG | RESPIRATION RATE: 18 BRPM | WEIGHT: 274 LBS

## 2020-11-06 DIAGNOSIS — M22.42 CHONDROMALACIA PATELLAE OF LEFT KNEE: ICD-10-CM

## 2020-11-06 DIAGNOSIS — J98.8 MILD AIRFLOW OBSTRUCTION ON PULMONARY FUNCTION TEST: ICD-10-CM

## 2020-11-06 DIAGNOSIS — I10 HYPERTENSION GOAL BP (BLOOD PRESSURE) < 140/90: ICD-10-CM

## 2020-11-06 DIAGNOSIS — R94.2 MILD AIRFLOW OBSTRUCTION ON PULMONARY FUNCTION TEST: ICD-10-CM

## 2020-11-06 DIAGNOSIS — M17.12 PRIMARY OSTEOARTHRITIS OF LEFT KNEE: ICD-10-CM

## 2020-11-06 DIAGNOSIS — M25.562 CHRONIC PAIN OF LEFT KNEE: ICD-10-CM

## 2020-11-06 DIAGNOSIS — G89.29 CHRONIC PAIN OF LEFT KNEE: ICD-10-CM

## 2020-11-06 DIAGNOSIS — Z01.818 PREOP GENERAL PHYSICAL EXAM: Primary | ICD-10-CM

## 2020-11-06 DIAGNOSIS — F17.200 TOBACCO USE DISORDER: ICD-10-CM

## 2020-11-06 PROCEDURE — 99214 OFFICE O/P EST MOD 30 MIN: CPT | Performed by: PHYSICIAN ASSISTANT

## 2020-11-06 ASSESSMENT — PAIN SCALES - GENERAL: PAINLEVEL: EXTREME PAIN (8)

## 2020-11-06 ASSESSMENT — MIFFLIN-ST. JEOR: SCORE: 2107.86

## 2020-11-06 NOTE — TELEPHONE ENCOUNTER
Please advise patient that his insurance company will not pay for the MRI until after he completes 6 weeks of physical therapy.  Please assist with scheduling that.    Macario Arango MD, FAAFP  Family Medicine Physician  [facility] [place]  [address] [place] [address]

## 2020-11-09 ENCOUNTER — TELEPHONE (OUTPATIENT)
Dept: ORTHOPEDICS | Facility: OTHER | Age: 55
End: 2020-11-09

## 2020-11-09 NOTE — TELEPHONE ENCOUNTER
Reason for Call:  Other call back    Detailed comments: Lizet called to tell us the Auth Number [id number] for the MRI     Phone Number Patient can be reached at: Home number on file [phone] (home)    Best Time: NA    Can we leave a detailed message on this number? YES    Call taken on 11/9/2020 at 2:11 PM by Kimberly Brody

## 2020-11-10 ENCOUNTER — HOSPITAL ENCOUNTER (OUTPATIENT)
Dept: MRI IMAGING | Facility: CLINIC | Age: 55
Discharge: HOME | End: 2020-11-10
Attending: NURSE PRACTITIONER | Admitting: NURSE PRACTITIONER
Payer: COMMERCIAL

## 2020-11-10 ENCOUNTER — HOSPITAL ENCOUNTER (OUTPATIENT)
Dept: PHYSICAL THERAPY | Facility: CLINIC | Age: 55
Setting detail: THERAPIES SERIES
End: 2020-11-10
Attending: FAMILY MEDICINE
Payer: COMMERCIAL

## 2020-11-10 DIAGNOSIS — M25.562 CHRONIC PAIN OF LEFT KNEE: ICD-10-CM

## 2020-11-10 DIAGNOSIS — M54.50 ACUTE MIDLINE LOW BACK PAIN WITHOUT SCIATICA: ICD-10-CM

## 2020-11-10 DIAGNOSIS — G89.29 CHRONIC PAIN OF LEFT KNEE: ICD-10-CM

## 2020-11-10 DIAGNOSIS — M17.12 PRIMARY OSTEOARTHRITIS OF LEFT KNEE: ICD-10-CM

## 2020-11-10 PROCEDURE — 97161 PT EVAL LOW COMPLEX 20 MIN: CPT | Mod: GP | Performed by: PHYSICAL THERAPIST

## 2020-11-10 PROCEDURE — 73721 MRI JNT OF LWR EXTRE W/O DYE: CPT | Mod: 26 | Performed by: RADIOLOGY

## 2020-11-10 PROCEDURE — 73721 MRI JNT OF LWR EXTRE W/O DYE: CPT | Mod: LT

## 2020-11-10 PROCEDURE — 97110 THERAPEUTIC EXERCISES: CPT | Mod: GP | Performed by: PHYSICAL THERAPIST

## 2020-11-10 PROCEDURE — 97140 MANUAL THERAPY 1/> REGIONS: CPT | Mod: GP | Performed by: PHYSICAL THERAPIST

## 2020-11-10 NOTE — PROGRESS NOTES
11/10/20 1400   General Information   Type of Visit Initial OP Ortho PT Evaluation   Start of Care Date 11/10/20   Referring Physician Macario Arango MD    Orders Evaluate and Treat   Date of Order 11/10/20   Certification Required? No   Medical Diagnosis Low back pain.   Surgical/Medical history reviewed Yes   Body Part(s)   Body Part(s) Lumbar Spine/SI   Presentation and Etiology   Pertinent history of current problem (include personal factors and/or comorbidities that impact the POC) About a month ago patient was bending forward and felt a sharp pain in his right side.  Pt notes pain got worse over the subsequent weeks.  Pt reports flair up yesterday while reaching up to do some painting.  Pt reports some relief today with initiating flexoril.  PMHx: CTS (carpal tunnel syndrome), Chondromalacia patellae of left knee, Hypertension, Hiatal hernia, GERD (gastroesophageal reflux disease).   Impairments A. Pain;D. Decreased ROM;E. Decreased flexibility;F. Decreased strength and endurance   Functional Limitations perform activities of daily living;perform desired leisure / sports activities   Symptom Location R side of low back and can spread to L side when worse.     How/Where did it occur While lifting   Onset date of current episode/exacerbation 10/10/20   Chronicity New   Pain rating (0-10 point scale) Best (/10);Worst (/10)   Best (/10) 2/10   Worst (/10) 8/10   Pain quality A. Sharp   Frequency of pain/symptoms C. With activity   Pain/symptoms are: Worse during the day   Pain/symptoms exacerbated by B. Walking;C. Lifting;D. Carrying;G. Certain positions   Pain/symptoms eased by A. Sitting;C. Rest;E. Changing positions   Progression of symptoms since onset: Unchanged   Current Level of Function   Current Community Support Family/friend caregiver   Patient role/employment history E. Unemployed   Living environment House/townhome   Home/community accessibility 3 steps into house.   Fall Risk Screen   Fall  screen completed by PT   Have you fallen 2 or more times in the past year? No   Have you fallen and had an injury in the past year? No   Abuse Screen (yes response referral indicated)   Feels Unsafe at Home or Work/School no   Feels Threatened by Someone no   Does Anyone Try to Keep You From Having Contact with Others or Doing Things Outside Your Home? no   Physical Signs of Abuse Present no   Functional Scales   Functional Scales Other   Other Scales  SALIMA and Jeffry   Lumbar Spine/SI Objective Findings   Observation Fabian's sign returning from lumbar flexion.   Flexion ROM 90%   Extension ROM 75%   Right Side Bending ROM 50%   Left Side Bending ROM 50%   Repeated Extension-Standing ROM No change   Repeated Flexion-Standing ROM No change   Hip Flexion (L2) Strength 5/5   Hip Abduction Strength 5/5   Hip Adduction Strength 5/5   Hip Extension Strength 5/5   Knee Flexion Strength 5/5   Knee Extension (L3) Strength 5/5   Ankle Dorsiflexion (L4) Strength 5/5   Great Toe Extension (L5) Strength 5/5   Ankle Plantar Flexion (S1) Strength 5/5   Crossover SLR Negative   Slump Test Negative   Spring Test Positive.   Planned Therapy Interventions   Planned Therapy Interventions joint mobilization;manual therapy;motor coordination training;neuromuscular re-education;ROM;strengthening;stretching   Planned Modality Interventions   Planned Modality Interventions Cryotherapy;Electrical stimulation;Hot packs   Clinical Impression   Criteria for Skilled Therapeutic Interventions Met yes, treatment indicated   PT Diagnosis Low back pain.   Influenced by the following impairments poor lumbar motor control, limited ROM, and pain.     Functional limitations due to impairments Lifting, walking, prolonged standing.   Clinical Presentation Stable/Uncomplicated   Clinical Presentation Rationale Clinical judgement.   Clinical Decision Making (Complexity) Low complexity   Therapy Frequency 1 time/week   Predicted Duration of Therapy  Intervention (days/wks) 6 weeks.   Risk & Benefits of therapy have been explained Yes   Patient, Family & other staff in agreement with plan of care Yes   Clinical Impression Comments Pt presented to PT with symptoms of low back pain that appear musculoskeletal in nature.  Pt will benefit from skilled PT to improve lumbar motor control and reduce symptoms with manual therapy as indicated.   Education Assessment   Preferred Learning Style Listening;Demonstration;Pictures/video   Barriers to Learning No barriers   ORTHO GOALS   PT Ortho Eval Goals 1;2   Ortho Goal 1   Goal Identifier SALIMA   Goal Description Pt will report 10% improvement per SALIMA in order to demonstrate functional improvement with low back.   Target Date 12/22/20   Ortho Goal 2   Goal Identifier Pain   Goal Description Pt will report no more than 3/10 pain in low back at the worst over the past week in order to improve management of low back pain.   Target Date 12/22/20   Total Evaluation Time   PT Eval, Low Complexity Minutes (55608) 15

## 2020-11-11 NOTE — TELEPHONE ENCOUNTER
Several attempts were made to contact patient no response   Closing encounter  Estrellita Mondragon RT (R)

## 2020-12-02 ENCOUNTER — OFFICE VISIT (OUTPATIENT)
Dept: ORTHOPEDICS | Facility: CLINIC | Age: 55
End: 2020-12-02
Payer: COMMERCIAL

## 2020-12-02 VITALS
BODY MASS INDEX: 37.86 KG/M2 | WEIGHT: 279.5 LBS | DIASTOLIC BLOOD PRESSURE: 70 MMHG | HEIGHT: 72 IN | SYSTOLIC BLOOD PRESSURE: 132 MMHG

## 2020-12-02 DIAGNOSIS — M23.204 DEGENERATIVE TEAR OF MEDIAL MENISCUS, LEFT: ICD-10-CM

## 2020-12-02 DIAGNOSIS — M17.12 PRIMARY OSTEOARTHRITIS OF LEFT KNEE: Primary | ICD-10-CM

## 2020-12-02 PROCEDURE — 99214 OFFICE O/P EST MOD 30 MIN: CPT | Performed by: ORTHOPAEDIC SURGERY

## 2020-12-02 ASSESSMENT — PAIN SCALES - GENERAL: PAINLEVEL: MODERATE PAIN (5)

## 2020-12-02 ASSESSMENT — MIFFLIN-ST. JEOR: SCORE: 2132.86

## 2020-12-02 NOTE — PROGRESS NOTES
"Office Visit-Follow up    Chief Complaint: Amol Peres is a 55 year old male who is being seen for   Chief Complaint   Patient presents with     RECHECK     mri results of left knee       History of Present Illness:   Today's visit:  Returns for his MRI results.  Same pain as before.  November 4, 2020 visit:  He was seen by Quincy Arzate August 2020 and received a left knee steroid injection. States no improvement after the last injection. The left knee continues to get progressively worse.  Constant moderate to severe pain. Currently 8/10. Waking him at night, and keeping him up, struggling with activity, walking, and work due to pain. Is a . Unable to kneel to pain anymore.   Treatments tried: multiple steroid injection, rest, NSAIDs, activity modification, time, physical therapy.  March 4, 2020 visit:  Returns for bilateral knees.  Left is much worse than the right.  The right \"hurts just a little bit\".  Left has constant pain.  Particular in the medial aspect.  Associated with swelling.  When the knee pain flares he will get pain down his lower shin.  On his last visit he received an intra-articular steroid injection which provided about 6 weeks relief.  Is been taken ibuprofen 800 mg once a day.  Resting modifying activities.  No formal therapy.   April 18, 2019 visit:  Patient previously saw Dr. Hill for his left knee - he had a steroid injection April 2018, which helped until recently.  His right knee has also started bothering him now for the past six months.  Right knee feels the same as his left knee.  Mechanism of Injury: No trauma or inciting event.  Location: Left knee medial, right knee global  Duration of Pain:  Left knee started bothering him again early this month.  Right knee pain for about six months  Rating of Pain:  moderate.    Pain Quality: sharp  Pain is better with: Rest  Pain is worse with:  twisting, stairs  Treatment so far consists of:  Steroid injection last year on left side, " "which helped until recently, physical therapy, Ibuprofen.   Associated Features: None  Prior history of related problems:   The pain is getting worse.      Social History     Occupational History     Not on file   Tobacco Use     Smoking status: Current Every Day Smoker     Packs/day: 1.00     Smokeless tobacco: Never Used   Substance and Sexual Activity     Alcohol use: Yes     Comment: occasionally     Drug use: No     Sexual activity: Yes     Partners: Female       REVIEW OF SYSTEMS  General: negative for, night sweats, dizziness, fatigue  Resp: No shortness of breath and no cough  CV: negative for chest pain, syncope or near-syncope  GI: negative for nausea, vomiting and diarrhea  : negative for dysuria and hematuria  Musculoskeletal: as above  Neurologic: negative for syncope   Hematologic: negative for bleeding disorder    Physical Exam:  Vitals: /70   Ht 1.832 m (6' 0.13\")   Wt 126.8 kg (279 lb 8 oz)   BMI 37.77 kg/m    BMI= Body mass index is 37.77 kg/m .  Constitutional: healthy, alert and no acute distress   Psychiatric: mentation appears normal and affect normal/bright  NEURO: no focal deficits  RESP: Normal with easy respirations and no use of accessory muscles to breathe, no audible wheezing or retractions  CV: LLE:  no edema         Regular rate and rhythm by palpation  SKIN: No erythema, rashes, excoriation, or breakdown. No evidence of infection.   JOINT/EXTREMITIES:left knee: Tenderness along medial joint line.  Varus deformity.  Knee does correct with neutral with valgus stressing.  Collateral ligaments are intact.  Patella tracks midline.  Small effusion no evidence infection  GAIT: antalgic            Diagnostic Modalities:  left knee MRI:    1. Complex degenerative tear medial meniscus with slight extrusion  medially of the body with the posterior root ligament intact.  2. Advanced degenerative changes medial femoral tibial joint  compartment with areas of total or near-total joint " cartilage loss  weightbearing surface medial femoral and tibial condyles.  3. Dissecting popliteal cyst coursing from the joint and pop area of  the popliteus tendon posterior to the tibia extending 6 cm distal to  the jointline.  4. Some edema in the subcutaneous fat anterior knee.        Independent visualization of the images was performed.      Impression: left knee medial meniscus tear degenerative  Left knee primary osteoarthritis    Plan:  All of the above pertinent physical exam and imaging modalities findings was reviewed with Amol.    The patient has attempted conservative treatments that include NSAIDs, Tylenol, focused self directed physical therapy, formal physical therapy, steroid injections, activity modifications, rest yet still continues to have significant issues. These issues include pain at rest, increased pain with activity, pain that wakes at night, gait disturbances. Secondary to these reasons I have offered surgery in the form of a left total knee arthroplasty.    We had a lengthy discussion regarding treatment options including knee arthroscopy, partial knee replacement, and total knee replacement.  I discussed his young age.  We discussed the limitations of the surgeries.  Once this was thoroughly reviewed with him he wanted to proceed solely with full knee replacement.    Risks, benefits, complications, alternatives, limitations, and post operative course were discussed. Risks including infection (requiring long-term antibiotics and repeat surgeries), implant loosening (requiring revision surgery), heart attacks, strokes, bleeding (possibly requiring blood transfusion), scars, instability, numbness around the knee, stiffness (requiring manipulations or repeat surgeries), instability and dislocations, fractures, blood clots the legs and lungs, nerve injury (possibly leading to foot drop).  Postoperative course was discussed as far as limitations and expected recovery times. It was also  discussed that after surgery there is a need for blood thinners to prevent blood clots, but even when being treated it is possible to develop a blood clot. Anticipate being hospitalized 1 days and subsequently either discharged home or to a rehabilitation facility. Determinations of this will be based on progress with physical therapy while in the hospital. The importance of post-operative physical therapy was discussed. If discharge home from the hospital expect [facility] home physical therapy for about 2 weeks and then transition to going to a physical therapy location for more aggressive therapy. Without physical therapy, outcomes may not be optimal. All questions were answered. The patient agrees to proceed with surgery.  Past medical and surgical history was reviewed. It is positive for abnormal pulmonary function test, chronic knee pain. Mr. Peres will need a pre-operative medical evaluation and clearance by a primary care provider. We will obtain a CBC as well as the appropriate radiographs prior to surgery. I will leave it to the discretion of the primary care provider for additional pre-operative testing.     Anticipate next day discharge.  Aspirin for DVT prophylaxis.    Due to Covid we are currently not scheduling.  I discussed most likely would not be scheduled until February/March.    We will need to see him 1 week prior to surgery.    Return to clinic 14 days post-operatively. , or sooner as needed for changes.  Re-x-ray on return: Yes.    Richard Quezada D.O.

## 2020-12-02 NOTE — LETTER
"    12/2/2020         RE: Amol Peres  [address]  [facility] [address]        Dear Colleague,    Thank you for referring your patient, Amol Peres, to the [facility]. Please see a copy of my visit note below.    Office Visit-Follow up    Chief Complaint: Amol Peres is a 55 year old male who is being seen for   Chief Complaint   Patient presents with     RECHECK     mri results of left knee       History of Present Illness:   Today's visit:  Returns for his MRI results.  Same pain as before.  November 4, 2020 visit:  He was seen by Quincy Arzate August 2020 and received a left knee steroid injection. States no improvement after the last injection. The left knee continues to get progressively worse.  Constant moderate to severe pain. Currently 8/10. Waking him at night, and keeping him up, struggling with activity, walking, and work due to pain. Is a . Unable to kneel to pain anymore.   Treatments tried: multiple steroid injection, rest, NSAIDs, activity modification, time, physical therapy.  March 4, 2020 visit:  Returns for bilateral knees.  Left is much worse than the right.  The right \"hurts just a little bit\".  Left has constant pain.  Particular in the medial aspect.  Associated with swelling.  When the knee pain flares he will get pain down his lower shin.  On his last visit he received an intra-articular steroid injection which provided about 6 weeks relief.  Is been taken ibuprofen 800 mg once a day.  Resting modifying activities.  No formal therapy.   April 18, 2019 visit:  Patient previously saw Dr. Hill for his left knee - he had a steroid injection April 2018, which helped until recently.  His right knee has also started bothering him now for the past six months.  Right knee feels the same as his left knee.  Mechanism of Injury: No trauma or inciting event.  Location: Left knee medial, right knee global  Duration of Pain:  Left knee started bothering " "him again early this month.  Right knee pain for about six months  Rating of Pain:  moderate.    Pain Quality: sharp  Pain is better with: Rest  Pain is worse with:  twisting, stairs  Treatment so far consists of:  Steroid injection last year on left side, which helped until recently, physical therapy, Ibuprofen.   Associated Features: None  Prior history of related problems:   The pain is getting worse.      Social History     Occupational History     Not on file   Tobacco Use     Smoking status: Current Every Day Smoker     Packs/day: 1.00     Smokeless tobacco: Never Used   Substance and Sexual Activity     Alcohol use: Yes     Comment: occasionally     Drug use: No     Sexual activity: Yes     Partners: Female       REVIEW OF SYSTEMS  General: negative for, night sweats, dizziness, fatigue  Resp: No shortness of breath and no cough  CV: negative for chest pain, syncope or near-syncope  GI: negative for nausea, vomiting and diarrhea  : negative for dysuria and hematuria  Musculoskeletal: as above  Neurologic: negative for syncope   Hematologic: negative for bleeding disorder    Physical Exam:  Vitals: /70   Ht 1.832 m (6' 0.13\")   Wt 126.8 kg (279 lb 8 oz)   BMI 37.77 kg/m    BMI= Body mass index is 37.77 kg/m .  Constitutional: healthy, alert and no acute distress   Psychiatric: mentation appears normal and affect normal/bright  NEURO: no focal deficits  RESP: Normal with easy respirations and no use of accessory muscles to breathe, no audible wheezing or retractions  CV: LLE:  no edema         Regular rate and rhythm by palpation  SKIN: No erythema, rashes, excoriation, or breakdown. No evidence of infection.   JOINT/EXTREMITIES:left knee: Tenderness along medial joint line.  Varus deformity.  Knee does correct with neutral with valgus stressing.  Collateral ligaments are intact.  Patella tracks midline.  Small effusion no evidence infection  GAIT: antalgic            Diagnostic Modalities:  left " knee MRI:    1. Complex degenerative tear medial meniscus with slight extrusion  medially of the body with the posterior root ligament intact.  2. Advanced degenerative changes medial femoral tibial joint  compartment with areas of total or near-total joint cartilage loss  weightbearing surface medial femoral and tibial condyles.  3. Dissecting popliteal cyst coursing from the joint and pop area of  the popliteus tendon posterior to the tibia extending 6 cm distal to  the jointline.  4. Some edema in the subcutaneous fat anterior knee.        Independent visualization of the images was performed.      Impression: left knee medial meniscus tear degenerative  Left knee primary osteoarthritis    Plan:  All of the above pertinent physical exam and imaging modalities findings was reviewed with Amol.    The patient has attempted conservative treatments that include NSAIDs, Tylenol, focused self directed physical therapy, formal physical therapy, steroid injections, activity modifications, rest yet still continues to have significant issues. These issues include pain at rest, increased pain with activity, pain that wakes at night, gait disturbances. Secondary to these reasons I have offered surgery in the form of a left total knee arthroplasty.    We had a lengthy discussion regarding treatment options including knee arthroscopy, partial knee replacement, and total knee replacement.  I discussed his young age.  We discussed the limitations of the surgeries.  Once this was thoroughly reviewed with him he wanted to proceed solely with full knee replacement.    Risks, benefits, complications, alternatives, limitations, and post operative course were discussed. Risks including infection (requiring long-term antibiotics and repeat surgeries), implant loosening (requiring revision surgery), heart attacks, strokes, bleeding (possibly requiring blood transfusion), scars, instability, numbness around the knee, stiffness (requiring  manipulations or repeat surgeries), instability and dislocations, fractures, blood clots the legs and lungs, nerve injury (possibly leading to foot drop).  Postoperative course was discussed as far as limitations and expected recovery times. It was also discussed that after surgery there is a need for blood thinners to prevent blood clots, but even when being treated it is possible to develop a blood clot. Anticipate being hospitalized 1 days and subsequently either discharged home or to a rehabilitation facility. Determinations of this will be based on progress with physical therapy while in the hospital. The importance of post-operative physical therapy was discussed. If discharge home from the hospital expect [facility] home physical therapy for about 2 weeks and then transition to going to a physical therapy location for more aggressive therapy. Without physical therapy, outcomes may not be optimal. All questions were answered. The patient agrees to proceed with surgery.  Past medical and surgical history was reviewed. It is positive for abnormal pulmonary function test, chronic knee pain. Mr. Peres will need a pre-operative medical evaluation and clearance by a primary care provider. We will obtain a CBC as well as the appropriate radiographs prior to surgery. I will leave it to the discretion of the primary care provider for additional pre-operative testing.     Anticipate next day discharge.  Aspirin for DVT prophylaxis.    Due to Covid we are currently not scheduling.  I discussed most likely would not be scheduled until February/March.    We will need to see him 1 week prior to surgery.    Return to clinic 14 days post-operatively. , or sooner as needed for changes.  Re-x-ray on return: Yes.    Richard Quezada D.O.            Again, thank you for allowing me to participate in the care of your patient.        Sincerely,        Joel Quezada, DO

## 2020-12-29 ENCOUNTER — TELEPHONE (OUTPATIENT)
Dept: ORTHOPEDICS | Facility: CLINIC | Age: 55
End: 2020-12-29

## 2020-12-29 DIAGNOSIS — Z01.818 PRE-OP EXAM: Primary | ICD-10-CM

## 2020-12-29 NOTE — LETTER
[address] [address]  [phone]        December 29, 2020    Amol Peres  [address]  [facility] [address]

## 2020-12-29 NOTE — TELEPHONE ENCOUNTER
Type of surgery: left total knee replacement  Location of surgery: [facility]   Date of surgery: 1/12/21  Surgeon: Dr. Quezada  Pre-Op Appt Date: 12/31  Post-Op Appt Date: 1/25   Packet sent out: Surgery packet mailed to patient's home address.   Pre-cert/Authorization completed: NA  Date: 12/29/2020    Nicole Aguilar  Surgery Scheduler

## 2020-12-30 NOTE — PROGRESS NOTES
Outpatient Physical Therapy Discharge Note     Patient: Amol Peres  : 1965    Beginning/End Dates of Reporting Period:  11/10/20     Referring Provider: Macario Arango MD     Therapy Diagnosis: Low back pain.     Client Self Report: See eval.    Objective measures:  SALIMA: 16.      Goals:  Goal Identifier SALIMA   Goal Description Pt will report 10% improvement per SALIMA in order to demonstrate functional improvement with low back.   Target Date 20   Date Met      Progress:     Goal Identifier Pain   Goal Description Pt will report no more than 3/10 pain in low back at the worst over the past week in order to improve management of low back pain.   Target Date 20   Date Met      Progress:       Progress Toward Goals:   Progress limited due to discontinued and opted for TKA to be performed in January.      Plan:  Discharge from therapy.    Discharge:    Reason for Discharge: Patient chooses to discontinue therapy.    Equipment Issued: None    Discharge Plan: Patient to continue home program.

## 2020-12-30 NOTE — TELEPHONE ENCOUNTER
Delicia calling from [facility] specialty to give auth # of:  [id number] 1-12-21 through 03-12-21

## 2020-12-31 ENCOUNTER — OFFICE VISIT (OUTPATIENT)
Dept: FAMILY MEDICINE | Facility: OTHER | Age: 55
End: 2020-12-31
Payer: COMMERCIAL

## 2020-12-31 VITALS
TEMPERATURE: 97.5 F | DIASTOLIC BLOOD PRESSURE: 76 MMHG | RESPIRATION RATE: 16 BRPM | HEART RATE: 74 BPM | SYSTOLIC BLOOD PRESSURE: 130 MMHG | WEIGHT: 285.5 LBS | BODY MASS INDEX: 38.67 KG/M2 | HEIGHT: 72 IN | OXYGEN SATURATION: 98 %

## 2020-12-31 DIAGNOSIS — Z11.4 SCREENING FOR HIV (HUMAN IMMUNODEFICIENCY VIRUS): ICD-10-CM

## 2020-12-31 DIAGNOSIS — J98.8 MILD AIRFLOW OBSTRUCTION ON PULMONARY FUNCTION TEST: ICD-10-CM

## 2020-12-31 DIAGNOSIS — R94.2 MILD AIRFLOW OBSTRUCTION ON PULMONARY FUNCTION TEST: ICD-10-CM

## 2020-12-31 DIAGNOSIS — M22.42 CHONDROMALACIA PATELLAE OF LEFT KNEE: ICD-10-CM

## 2020-12-31 DIAGNOSIS — G89.29 CHRONIC PAIN OF LEFT KNEE: ICD-10-CM

## 2020-12-31 DIAGNOSIS — M25.562 CHRONIC PAIN OF LEFT KNEE: ICD-10-CM

## 2020-12-31 DIAGNOSIS — Z01.818 PREOP GENERAL PHYSICAL EXAM: Primary | ICD-10-CM

## 2020-12-31 DIAGNOSIS — F17.200 TOBACCO USE DISORDER: ICD-10-CM

## 2020-12-31 DIAGNOSIS — Z01.818 PRE-OP EXAM: ICD-10-CM

## 2020-12-31 DIAGNOSIS — M17.12 PRIMARY OSTEOARTHRITIS OF LEFT KNEE: ICD-10-CM

## 2020-12-31 DIAGNOSIS — I10 HYPERTENSION GOAL BP (BLOOD PRESSURE) < 140/90: ICD-10-CM

## 2020-12-31 DIAGNOSIS — K21.9 GASTROESOPHAGEAL REFLUX DISEASE, UNSPECIFIED WHETHER ESOPHAGITIS PRESENT: ICD-10-CM

## 2020-12-31 LAB
ALBUMIN SERPL-MCNC: 3.8 G/DL (ref 3.4–5)
ALP SERPL-CCNC: 58 U/L (ref 40–150)
ALT SERPL W P-5'-P-CCNC: 32 U/L (ref 0–70)
ANION GAP SERPL CALCULATED.3IONS-SCNC: 4 MMOL/L (ref 3–14)
AST SERPL W P-5'-P-CCNC: 22 U/L (ref 0–45)
BILIRUB SERPL-MCNC: 1.3 MG/DL (ref 0.2–1.3)
BUN SERPL-MCNC: 16 MG/DL (ref 7–30)
CALCIUM SERPL-MCNC: 8 MG/DL (ref 8.5–10.1)
CHLORIDE SERPL-SCNC: 107 MMOL/L (ref 94–109)
CO2 SERPL-SCNC: 28 MMOL/L (ref 20–32)
CREAT SERPL-MCNC: 1.17 MG/DL (ref 0.66–1.25)
ERYTHROCYTE [DISTWIDTH] IN BLOOD BY AUTOMATED COUNT: 13.9 % (ref 10–15)
GFR SERPL CREATININE-BSD FRML MDRD: 68 ML/MIN/{1.73_M2}
GLUCOSE SERPL-MCNC: 96 MG/DL (ref 70–99)
HCT VFR BLD AUTO: 45.7 % (ref 40–53)
HGB BLD-MCNC: 15.6 G/DL (ref 13.3–17.7)
MCH RBC QN AUTO: 31.2 PG (ref 26.5–33)
MCHC RBC AUTO-ENTMCNC: 34.1 G/DL (ref 31.5–36.5)
MCV RBC AUTO: 91 FL (ref 78–100)
PLATELET # BLD AUTO: 187 10E9/L (ref 150–450)
POTASSIUM SERPL-SCNC: 4.1 MMOL/L (ref 3.4–5.3)
PROT SERPL-MCNC: 7.1 G/DL (ref 6.8–8.8)
RBC # BLD AUTO: 5 10E12/L (ref 4.4–5.9)
SODIUM SERPL-SCNC: 139 MMOL/L (ref 133–144)
WBC # BLD AUTO: 5.6 10E9/L (ref 4–11)

## 2020-12-31 PROCEDURE — 85027 COMPLETE CBC AUTOMATED: CPT | Performed by: ORTHOPAEDIC SURGERY

## 2020-12-31 PROCEDURE — 99214 OFFICE O/P EST MOD 30 MIN: CPT | Performed by: PHYSICIAN ASSISTANT

## 2020-12-31 PROCEDURE — 36415 COLL VENOUS BLD VENIPUNCTURE: CPT | Performed by: ORTHOPAEDIC SURGERY

## 2020-12-31 PROCEDURE — 80053 COMPREHEN METABOLIC PANEL: CPT | Performed by: ORTHOPAEDIC SURGERY

## 2020-12-31 ASSESSMENT — PAIN SCALES - GENERAL: PAINLEVEL: SEVERE PAIN (6)

## 2020-12-31 ASSESSMENT — MIFFLIN-ST. JEOR: SCORE: 2160.08

## 2020-12-31 NOTE — PATIENT INSTRUCTIONS

## 2020-12-31 NOTE — PROGRESS NOTES
[address]  [facility] [address]  [phone]  Dept: [phone]    PRE-OP EVALUATION:  Today's date: 2020    Amol Peres (: 1965) presents for pre-operative evaluation assessment as requested by Dr. Quezada.  He requires evaluation and anesthesia risk assessment prior to undergoing surgery/procedure for treatment of Left Knee .    Fax number for surgical facility: [place] [facility]  Primary Physician: Arnulfo Damico  Type of Anesthesia Anticipated: General    Preop Questionnnaire:  Pre-op Questionnaire 2020   1. Have you ever had a heart attack or stroke? No   2. Have you ever had surgery on your heart or blood vessels, such as a stent placement, a coronary artery bypass, or surgery on an artery in your head, neck, heart, or legs? No   3. Do you have chest pain with activity? No   4. Do you have a history of  heart failure? No   5. Do you currently have a cold, bronchitis or symptoms of other infection? No   6. Do you have a cough, shortness of breath, or wheezing? No   7. Do you or anyone in your family have previous history of blood clots? No   8. Do you or does anyone in your family have a serious bleeding problem such as prolonged bleeding following surgeries or cuts? No   9. Have you ever had problems with anemia or been told to take iron pills? No   10. Have you had any abnormal blood loss such as black, tarry or bloody stools? No   11. Have you ever had a blood transfusion? No   12. Are you willing to have a blood transfusion if it is medically needed before, during, or after your surgery? Yes   13. Have you or any of your relatives ever had problems with anesthesia? No   14. Do you have sleep apnea, excessive snoring or daytime drowsiness? No   15. Do you have any artifical heart valves or other implanted medical devices like a pacemaker, defibrillator, or continuous glucose monitor? No   16. Do you have artificial joints? No   17. Are  you allergic to latex? No         HPI:     HPI related to upcoming procedure: Left total knee replacement needed        MEDICAL HISTORY:     Patient Active Problem List    Diagnosis Date Noted     Chronic pain of left knee 11/04/2020     Priority: Medium     Morbid obesity (H) 02/25/2020     Priority: Medium     Other chest pain 02/25/2020     Priority: Medium     Primary osteoarthritis of left knee 04/18/2019     Priority: Medium     Lipoma of skin and subcutaneous tissue 12/31/2018     Priority: Medium     Mild airflow obstruction on pulmonary function test 04/06/2016     Priority: Medium     Tobacco use disorder 03/16/2016     Priority: Medium     CTS (carpal tunnel syndrome) 11/28/2014     Priority: Medium     right > left       Chondromalacia patellae of left knee 11/28/2014     Priority: Medium     Pain in thoracic spine 09/04/2014     Priority: Medium     Hypertension goal BP (blood pressure) < 140/90 06/25/2013     Priority: Medium     Hyperlipidemia LDL goal <130 10/31/2010     Priority: Medium      Past Medical History:   Diagnosis Date     Chondromalacia patellae of left knee 11/28/2014     CTS (carpal tunnel syndrome) 11/28/2014    right > left      GERD (gastroesophageal reflux disease)      Hiatal hernia 2008     Hypertension 3/26/2010     Mild airflow obstruction on pulmonary function test 4/6/2016     Morbid obesity with body mass index of 40.0-44.9 in adult (H) 3/16/2016     Tobacco use disorder 3/16/2016     Past Surgical History:   Procedure Laterality Date     APPENDECTOMY  04/04/10     COLONOSCOPY N/A 10/16/2015    Procedure: COMBINED COLONOSCOPY, SINGLE OR MULTIPLE BIOPSY/POLYPECTOMY BY BIOPSY;  Surgeon: Marcial Colvin MD;  Location:  GI     ESOPHAGOSCOPY, GASTROSCOPY, DUODENOSCOPY (EGD), COMBINED  12/27/2010    COMBINED ESOPHAGOSCOPY, GASTROSCOPY, DUODENOSCOPY (EGD), BIOPSY SINGLE OR MULTIPLE performed by JUSTINA MORGAN at  GI     ESOPHAGOSCOPY, GASTROSCOPY, DUODENOSCOPY (EGD),  COMBINED  12/26/2012    Procedure: COMBINED ESOPHAGOSCOPY, GASTROSCOPY, DUODENOSCOPY (EGD), BIOPSY SINGLE OR MULTIPLE;  ESOPHAGOSCOPY, GASTROSCOPY, DUODENOSCOPY WITH  SINGLE BIOPSY;  Surgeon: Patrice Car MD;  Location: [facility]     ESOPHAGOSCOPY, GASTROSCOPY, DUODENOSCOPY (EGD), COMBINED N/A 10/16/2015    Procedure: COMBINED ESOPHAGOSCOPY, GASTROSCOPY, DUODENOSCOPY (EGD), BIOPSY SINGLE OR MULTIPLE;  Surgeon: Marcial Colvin MD;  Location: [facility]     ESOPHAGOSCOPY, GASTROSCOPY, DUODENOSCOPY (EGD), COMBINED N/A 9/1/2017    Procedure: COMBINED ESOPHAGOSCOPY, GASTROSCOPY, DUODENOSCOPY (EGD), BIOPSY SINGLE OR MULTIPLE;  Esophagogastroduodenoscopy, Biopsy by Biopsy;  Surgeon: Kevin Fung MD;  Location: [facility]     HC UGI ENDOSCOPY DIAG W BIOPSY  8/28/2009     HC UGI ENDOSCOPY, SIMPLE EXAM  09/23/08     Current Outpatient Medications   Medication Sig Dispense Refill     atorvastatin (LIPITOR) 20 MG tablet Take 1 tablet (20 mg) by mouth daily 90 tablet 1     cyclobenzaprine (FLEXERIL) 10 MG tablet Take 1 tablet (10 mg) by mouth 3 times daily as needed for muscle spasms 30 tablet 0     naproxen (NAPROSYN) 500 MG tablet Take 1 tablet (500 mg) by mouth 2 times daily (with meals) 30 tablet 0     omeprazole (PRILOSEC) 40 MG DR capsule Take 1 capsule (40 mg) by mouth daily 90 capsule 1     sildenafil (VIAGRA) 100 MG tablet Take 0.5-1 tablets ( mg) by mouth daily as needed for erectile dysfunction Take 30 min to 4 hours before intercourse.  Never use with nitroglycerin, terazosin or doxazosin. 12 tablet 0     OTC products: no recent use of OTC ASA, NSAIDS or Steroids    Allergies   Allergen Reactions     No Known Drug Allergies       Latex Allergy: NO    Social History     Tobacco Use     Smoking status: Current Every Day Smoker     Packs/day: 1.00     Smokeless tobacco: Never Used   Substance Use Topics     Alcohol use: Yes     Comment: occasionally     History   Drug Use No       REVIEW OF SYSTEMS:  "  CONSTITUTIONAL: NEGATIVE for fever, chills, change in weight  INTEGUMENTARY/SKIN: NEGATIVE for worrisome rashes, moles or lesions  EYES: NEGATIVE for vision changes or irritation  ENT/MOUTH: NEGATIVE for ear, mouth and throat problems  RESP: NEGATIVE for significant cough or SOB  BREAST: NEGATIVE for masses, tenderness or discharge  CV: NEGATIVE for chest pain, palpitations or peripheral edema  GI: NEGATIVE for nausea, abdominal pain, heartburn, or change in bowel habits  : NEGATIVE for frequency, dysuria, or hematuria  MUSCULOSKELETAL: NEGATIVE for significant arthralgias or myalgia  NEURO: NEGATIVE for weakness, dizziness or paresthesias  ENDOCRINE: NEGATIVE for temperature intolerance, skin/hair changes  HEME: NEGATIVE for bleeding problems  PSYCHIATRIC: NEGATIVE for changes in mood or affect    EXAM:   Pulse 74   Resp 16   Ht 1.832 m (6' 0.13\")   Wt 129.5 kg (285 lb 8 oz)   SpO2 98%   BMI 38.58 kg/m      GENERAL APPEARANCE: healthy, alert and no distress     EYES: EOMI,  PERRL     HENT: ear canals and TM's normal and nose and mouth without ulcers or lesions     NECK: no adenopathy, no asymmetry, masses, or scars and thyroid normal to palpation     RESP: lungs clear to auscultation - no rales, rhonchi or wheezes     CV: regular rates and rhythm, normal S1 S2, no S3 or S4 and no murmur, click or rub     ABDOMEN:  soft, nontender, no HSM or masses and bowel sounds normal     MS: extremities normal- no gross deformities noted, no evidence of inflammation in joints, FROM in all extremities.     SKIN: no suspicious lesions or rashes     NEURO: Normal strength and tone, sensory exam grossly normal, mentation intact and speech normal     PSYCH: mentation appears normal. and affect normal/bright     LYMPHATICS: No cervical adenopathy    DIAGNOSTICS:   No labs or EKG required for low risk surgery (cataract, skin procedure, breast biopsy, etc)    Recent Labs   Lab Test 02/25/20  0927 12/31/18  1117 03/16/16  1012 "   HGB 15.4  --  16.6     --  195    140 138   POTASSIUM 4.7 4.2 4.3   CR 1.07 1.22 1.23        IMPRESSION:   Reason for surgery/procedure: left knee in need of surgical intervention  Diagnosis/reason for consult: anesthesia / surgical clearance    The proposed surgical procedure is considered INTERMEDIATE risk.    REVISED CARDIAC RISK INDEX  The patient has the following serious cardiovascular risks for perioperative complications such as (MI, PE, VFib and 3  AV Block):  No serious cardiac risks  INTERPRETATION: 1 risks: Class II (low risk - 0.9% complication rate)    The patient has the following additional risks for perioperative complications:  No identified additional risks      ICD-10-CM    1. Preop general physical exam  Z01.818 Comprehensive metabolic panel   2. Screening for HIV (human immunodeficiency virus)  Z11.4    3. Chronic pain of left knee  M25.562 Comprehensive metabolic panel    G89.29    4. Chondromalacia patellae of left knee  M22.42 Comprehensive metabolic panel   5. Primary osteoarthritis of left knee  M17.12 Comprehensive metabolic panel   6. Hypertension goal BP (blood pressure) < 140/90  I10 Comprehensive metabolic panel   7. Gastroesophageal reflux disease, unspecified whether esophagitis present  K21.9    8. Pre-op exam  Z01.818 **CBC with platelets FUTURE 14d   9. Mild airflow obstruction on pulmonary function test  J98.8 Comprehensive metabolic panel    R94.2    10. Tobacco use disorder  F17.200 Comprehensive metabolic panel       RECOMMENDATIONS:     --Consult hospital rounder / IM to assist post-op medical management    --Patient is to take all scheduled medications on the day of surgery EXCEPT for modifications listed below.    APPROVAL GIVEN to proceed with proposed procedure, without further diagnostic evaluation       Signed Electronically by: CURTIS Rodríguez PAEmilyC    Copy of this evaluation report is provided to requesting physician.    Miguel Preop  Guidelines    Revised Cardiac Risk Index

## 2020-12-31 NOTE — H&P (VIEW-ONLY)
[address]  [facility] [address]  [phone]  Dept: [phone]    PRE-OP EVALUATION:  Today's date: 2020    Amol Peres (: 1965) presents for pre-operative evaluation assessment as requested by Dr. Quezada.  He requires evaluation and anesthesia risk assessment prior to undergoing surgery/procedure for treatment of Left Knee .    Fax number for surgical facility: [place] [facility]  Primary Physician: Arnulfo Damico  Type of Anesthesia Anticipated: General    Preop Questionnnaire:  Pre-op Questionnaire 2020   1. Have you ever had a heart attack or stroke? No   2. Have you ever had surgery on your heart or blood vessels, such as a stent placement, a coronary artery bypass, or surgery on an artery in your head, neck, heart, or legs? No   3. Do you have chest pain with activity? No   4. Do you have a history of  heart failure? No   5. Do you currently have a cold, bronchitis or symptoms of other infection? No   6. Do you have a cough, shortness of breath, or wheezing? No   7. Do you or anyone in your family have previous history of blood clots? No   8. Do you or does anyone in your family have a serious bleeding problem such as prolonged bleeding following surgeries or cuts? No   9. Have you ever had problems with anemia or been told to take iron pills? No   10. Have you had any abnormal blood loss such as black, tarry or bloody stools? No   11. Have you ever had a blood transfusion? No   12. Are you willing to have a blood transfusion if it is medically needed before, during, or after your surgery? Yes   13. Have you or any of your relatives ever had problems with anesthesia? No   14. Do you have sleep apnea, excessive snoring or daytime drowsiness? No   15. Do you have any artifical heart valves or other implanted medical devices like a pacemaker, defibrillator, or continuous glucose monitor? No   16. Do you have artificial joints? No   17. Are  you allergic to latex? No         HPI:     HPI related to upcoming procedure: Left total knee replacement needed        MEDICAL HISTORY:     Patient Active Problem List    Diagnosis Date Noted     Chronic pain of left knee 11/04/2020     Priority: Medium     Morbid obesity (H) 02/25/2020     Priority: Medium     Other chest pain 02/25/2020     Priority: Medium     Primary osteoarthritis of left knee 04/18/2019     Priority: Medium     Lipoma of skin and subcutaneous tissue 12/31/2018     Priority: Medium     Mild airflow obstruction on pulmonary function test 04/06/2016     Priority: Medium     Tobacco use disorder 03/16/2016     Priority: Medium     CTS (carpal tunnel syndrome) 11/28/2014     Priority: Medium     right > left       Chondromalacia patellae of left knee 11/28/2014     Priority: Medium     Pain in thoracic spine 09/04/2014     Priority: Medium     Hypertension goal BP (blood pressure) < 140/90 06/25/2013     Priority: Medium     Hyperlipidemia LDL goal <130 10/31/2010     Priority: Medium      Past Medical History:   Diagnosis Date     Chondromalacia patellae of left knee 11/28/2014     CTS (carpal tunnel syndrome) 11/28/2014    right > left      GERD (gastroesophageal reflux disease)      Hiatal hernia 2008     Hypertension 3/26/2010     Mild airflow obstruction on pulmonary function test 4/6/2016     Morbid obesity with body mass index of 40.0-44.9 in adult (H) 3/16/2016     Tobacco use disorder 3/16/2016     Past Surgical History:   Procedure Laterality Date     APPENDECTOMY  04/04/10     COLONOSCOPY N/A 10/16/2015    Procedure: COMBINED COLONOSCOPY, SINGLE OR MULTIPLE BIOPSY/POLYPECTOMY BY BIOPSY;  Surgeon: Marcial Colvin MD;  Location:  GI     ESOPHAGOSCOPY, GASTROSCOPY, DUODENOSCOPY (EGD), COMBINED  12/27/2010    COMBINED ESOPHAGOSCOPY, GASTROSCOPY, DUODENOSCOPY (EGD), BIOPSY SINGLE OR MULTIPLE performed by JUSTINA MORGAN at  GI     ESOPHAGOSCOPY, GASTROSCOPY, DUODENOSCOPY (EGD),  COMBINED  12/26/2012    Procedure: COMBINED ESOPHAGOSCOPY, GASTROSCOPY, DUODENOSCOPY (EGD), BIOPSY SINGLE OR MULTIPLE;  ESOPHAGOSCOPY, GASTROSCOPY, DUODENOSCOPY WITH  SINGLE BIOPSY;  Surgeon: Patrice Car MD;  [facility]     ESOPHAGOSCOPY, GASTROSCOPY, DUODENOSCOPY (EGD), COMBINED N/A 10/16/2015    Procedure: COMBINED ESOPHAGOSCOPY, GASTROSCOPY, DUODENOSCOPY (EGD), BIOPSY SINGLE OR MULTIPLE;  Surgeon: Marcial Colvin MD;  [facility]     ESOPHAGOSCOPY, GASTROSCOPY, DUODENOSCOPY (EGD), COMBINED N/A 9/1/2017    Procedure: COMBINED ESOPHAGOSCOPY, GASTROSCOPY, DUODENOSCOPY (EGD), BIOPSY SINGLE OR MULTIPLE;  Esophagogastroduodenoscopy, Biopsy by Biopsy;  Surgeon: Kevin Fung MD;  Location: [facility]     HC UGI ENDOSCOPY DIAG W BIOPSY  8/28/2009     HC UGI ENDOSCOPY, SIMPLE EXAM  09/23/08     Current Outpatient Medications   Medication Sig Dispense Refill     atorvastatin (LIPITOR) 20 MG tablet Take 1 tablet (20 mg) by mouth daily 90 tablet 1     cyclobenzaprine (FLEXERIL) 10 MG tablet Take 1 tablet (10 mg) by mouth 3 times daily as needed for muscle spasms 30 tablet 0     naproxen (NAPROSYN) 500 MG tablet Take 1 tablet (500 mg) by mouth 2 times daily (with meals) 30 tablet 0     omeprazole (PRILOSEC) 40 MG DR capsule Take 1 capsule (40 mg) by mouth daily 90 capsule 1     sildenafil (VIAGRA) 100 MG tablet Take 0.5-1 tablets ( mg) by mouth daily as needed for erectile dysfunction Take 30 min to 4 hours before intercourse.  Never use with nitroglycerin, terazosin or doxazosin. 12 tablet 0     OTC products: no recent use of OTC ASA, NSAIDS or Steroids    Allergies   Allergen Reactions     No Known Drug Allergies       Latex Allergy: NO    Social History     Tobacco Use     Smoking status: Current Every Day Smoker     Packs/day: 1.00     Smokeless tobacco: Never Used   Substance Use Topics     Alcohol use: Yes     Comment: occasionally     History   Drug Use No       REVIEW OF SYSTEMS:  "  CONSTITUTIONAL: NEGATIVE for fever, chills, change in weight  INTEGUMENTARY/SKIN: NEGATIVE for worrisome rashes, moles or lesions  EYES: NEGATIVE for vision changes or irritation  ENT/MOUTH: NEGATIVE for ear, mouth and throat problems  RESP: NEGATIVE for significant cough or SOB  BREAST: NEGATIVE for masses, tenderness or discharge  CV: NEGATIVE for chest pain, palpitations or peripheral edema  GI: NEGATIVE for nausea, abdominal pain, heartburn, or change in bowel habits  : NEGATIVE for frequency, dysuria, or hematuria  MUSCULOSKELETAL: NEGATIVE for significant arthralgias or myalgia  NEURO: NEGATIVE for weakness, dizziness or paresthesias  ENDOCRINE: NEGATIVE for temperature intolerance, skin/hair changes  HEME: NEGATIVE for bleeding problems  PSYCHIATRIC: NEGATIVE for changes in mood or affect    EXAM:   Pulse 74   Resp 16   Ht 1.832 m (6' 0.13\")   Wt 129.5 kg (285 lb 8 oz)   SpO2 98%   BMI 38.58 kg/m      GENERAL APPEARANCE: healthy, alert and no distress     EYES: EOMI,  PERRL     HENT: ear canals and TM's normal and nose and mouth without ulcers or lesions     NECK: no adenopathy, no asymmetry, masses, or scars and thyroid normal to palpation     RESP: lungs clear to auscultation - no rales, rhonchi or wheezes     CV: regular rates and rhythm, normal S1 S2, no S3 or S4 and no murmur, click or rub     ABDOMEN:  soft, nontender, no HSM or masses and bowel sounds normal     MS: extremities normal- no gross deformities noted, no evidence of inflammation in joints, FROM in all extremities.     SKIN: no suspicious lesions or rashes     NEURO: Normal strength and tone, sensory exam grossly normal, mentation intact and speech normal     PSYCH: mentation appears normal. and affect normal/bright     LYMPHATICS: No cervical adenopathy    DIAGNOSTICS:   No labs or EKG required for low risk surgery (cataract, skin procedure, breast biopsy, etc)    Recent Labs   Lab Test 02/25/20  0927 12/31/18  1117 03/16/16  1012 "   HGB 15.4  --  16.6     --  195    140 138   POTASSIUM 4.7 4.2 4.3   CR 1.07 1.22 1.23        IMPRESSION:   Reason for surgery/procedure: left knee in need of surgical intervention  Diagnosis/reason for consult: anesthesia / surgical clearance    The proposed surgical procedure is considered INTERMEDIATE risk.    REVISED CARDIAC RISK INDEX  The patient has the following serious cardiovascular risks for perioperative complications such as (MI, PE, VFib and 3  AV Block):  No serious cardiac risks  INTERPRETATION: 1 risks: Class II (low risk - 0.9% complication rate)    The patient has the following additional risks for perioperative complications:  No identified additional risks      ICD-10-CM    1. Preop general physical exam  Z01.818 Comprehensive metabolic panel   2. Screening for HIV (human immunodeficiency virus)  Z11.4    3. Chronic pain of left knee  M25.562 Comprehensive metabolic panel    G89.29    4. Chondromalacia patellae of left knee  M22.42 Comprehensive metabolic panel   5. Primary osteoarthritis of left knee  M17.12 Comprehensive metabolic panel   6. Hypertension goal BP (blood pressure) < 140/90  I10 Comprehensive metabolic panel   7. Gastroesophageal reflux disease, unspecified whether esophagitis present  K21.9    8. Pre-op exam  Z01.818 **CBC with platelets FUTURE 14d   9. Mild airflow obstruction on pulmonary function test  J98.8 Comprehensive metabolic panel    R94.2    10. Tobacco use disorder  F17.200 Comprehensive metabolic panel       RECOMMENDATIONS:     --Consult hospital rounder / IM to assist post-op medical management    --Patient is to take all scheduled medications on the day of surgery EXCEPT for modifications listed below.    APPROVAL GIVEN to proceed with proposed procedure, without further diagnostic evaluation       Signed Electronically by: CURTIS Rodríguez PAEmilyC    Copy of this evaluation report is provided to requesting physician.    [facility]uel Preop  Guidelines    Revised Cardiac Risk Index

## 2021-01-04 ENCOUNTER — OFFICE VISIT (OUTPATIENT)
Dept: ORTHOPEDICS | Facility: CLINIC | Age: 56
End: 2021-01-04
Payer: COMMERCIAL

## 2021-01-04 VITALS
BODY MASS INDEX: 38.6 KG/M2 | HEIGHT: 72 IN | WEIGHT: 285 LBS | DIASTOLIC BLOOD PRESSURE: 88 MMHG | SYSTOLIC BLOOD PRESSURE: 138 MMHG

## 2021-01-04 DIAGNOSIS — M17.12 PRIMARY OSTEOARTHRITIS OF LEFT KNEE: Primary | ICD-10-CM

## 2021-01-04 PROCEDURE — 99207 PR PREOP VISIT IN GLOBAL PKG: CPT | Performed by: PHYSICIAN ASSISTANT

## 2021-01-04 ASSESSMENT — MIFFLIN-ST. JEOR: SCORE: 2157.81

## 2021-01-04 NOTE — PROGRESS NOTES
One Week Prior to Total Joint    1. Surgery: Left total knee arthroplasty on 1/12/2021 by Dr. Quezada  2. Labs: Within normal limits.  3. H&P: 12/31/2020 by Arnulfo Damico PA-C.  Cleared.  Hospital rounder requested.  Patient has GERD, hypertension, hyperlipidemia, morbid obesity.  4. Covid test: Patient does not need secondary to having Covid on 11/18/2020.  5. DVT prophylaxis: Asprin 325mg BID x 6 weeks   6. Dispo: Anticipate next day discharged to home, patient will have in-home physical therapy also the patient will be cared for by his wife.  Her [place] paperwork is being filled out and will be faxed for him.      This note was dictated with [employer].    Bryn Arzate PA-C

## 2021-01-04 NOTE — LETTER
1/4/2021         RE: Amol Peres  [address]  [place] [address]        Dear Colleague,    Thank you for referring your patient, Amol Peres, to the [facility]. Please see a copy of my visit note below.    One Week Prior to Total Joint    1. Surgery: Left total knee arthroplasty on 1/12/2021 by Dr. Quezada  2. Labs: Within normal limits.  3. H&P: 12/31/2020 by Arnulfo Damico PA-C.  Cleared.  Hospital rounder requested.  Patient has GERD, hypertension, hyperlipidemia, morbid obesity.  4. Covid test: Patient does not need secondary to having Covid on 11/18/2020.  5. DVT prophylaxis: Asprin 325mg BID x 6 weeks   6. Dispo: Anticipate next day discharged to home, patient will have in-home physical therapy also the patient will be cared for by his wife.  Her [facility] paperwork is being filled out and will be faxed for him.      This note was dictated with [email or web address].    Bryn Arzate PA-C          Again, thank you for allowing me to participate in the care of your patient.        Sincerely,        Bryn Arzate PA-C

## 2021-01-05 ENCOUNTER — TELEPHONE (OUTPATIENT)
Dept: ORTHOPEDICS | Facility: CLINIC | Age: 56
End: 2021-01-05

## 2021-01-05 NOTE — TELEPHONE ENCOUNTER
I called the patient.  Yesterday we did have a discussion about getting a steroid injection in the contralateral knee which would be the right knee at the time of surgery.  Try to contact Dr. Quezada was in surgery at the time, I was able to clarify this morning that he does not want to do it on the same day as the surgery but the patient could get it done prior to surgery or in the postoperative visit.  Like I talked to the patient yesterday about he would rather do it postoperatively and so we will plan on that when he comes in for his postop visit we will do a right knee steroid injection.  I called and talked to the patient about it this morning.

## 2021-01-05 NOTE — TELEPHONE ENCOUNTER
"Patient's form for his wife's FMLA was filled out, reviewed, and signed by provider.      Form was faxed to the designated fax # [phone]    A copy was sent to scanning.     A copy was placed in 2nd floor RN \"faxed\" file/drawer.      Patient was informed.       Asia ISABEL Lead RN, BSN. . .  1/5/2021, 12:54 PM    "

## 2021-01-12 ENCOUNTER — ANESTHESIA (OUTPATIENT)
Dept: SURGERY | Facility: CLINIC | Age: 56
End: 2021-01-12
Payer: COMMERCIAL

## 2021-01-12 ENCOUNTER — APPOINTMENT (OUTPATIENT)
Dept: GENERAL RADIOLOGY | Facility: CLINIC | Age: 56
End: 2021-01-12
Attending: NURSE PRACTITIONER
Payer: COMMERCIAL

## 2021-01-12 ENCOUNTER — HOSPITAL ENCOUNTER (OUTPATIENT)
Facility: CLINIC | Age: 56
Discharge: HOME | End: 2021-01-13
Attending: ORTHOPAEDIC SURGERY | Admitting: ORTHOPAEDIC SURGERY
Payer: COMMERCIAL

## 2021-01-12 ENCOUNTER — APPOINTMENT (OUTPATIENT)
Dept: PHYSICAL THERAPY | Facility: CLINIC | Age: 56
End: 2021-01-12
Attending: NURSE PRACTITIONER
Payer: COMMERCIAL

## 2021-01-12 ENCOUNTER — ANESTHESIA EVENT (OUTPATIENT)
Dept: SURGERY | Facility: CLINIC | Age: 56
End: 2021-01-12
Payer: COMMERCIAL

## 2021-01-12 DIAGNOSIS — Z96.651 S/P TOTAL KNEE REPLACEMENT USING CEMENT, RIGHT: Primary | ICD-10-CM

## 2021-01-12 DIAGNOSIS — R10.9 RIGHT FLANK PAIN: ICD-10-CM

## 2021-01-12 DIAGNOSIS — M17.12 PRIMARY OSTEOARTHRITIS OF LEFT KNEE: ICD-10-CM

## 2021-01-12 DIAGNOSIS — Z96.652 S/P TOTAL KNEE REPLACEMENT USING CEMENT, LEFT: ICD-10-CM

## 2021-01-12 PROCEDURE — 97162 PT EVAL MOD COMPLEX 30 MIN: CPT | Mod: GP | Performed by: PHYSICAL THERAPIST

## 2021-01-12 PROCEDURE — 258N000003 HC RX IP 258 OP 636: Performed by: NURSE PRACTITIONER

## 2021-01-12 PROCEDURE — 250N000011 HC RX IP 250 OP 636: Performed by: ORTHOPAEDIC SURGERY

## 2021-01-12 PROCEDURE — 258N000003 HC RX IP 258 OP 636: Performed by: NURSE ANESTHETIST, CERTIFIED REGISTERED

## 2021-01-12 PROCEDURE — 250N000013 HC RX MED GY IP 250 OP 250 PS 637: Performed by: NURSE PRACTITIONER

## 2021-01-12 PROCEDURE — 97110 THERAPEUTIC EXERCISES: CPT | Mod: GP | Performed by: PHYSICAL THERAPIST

## 2021-01-12 PROCEDURE — 258N000003 HC RX IP 258 OP 636: Performed by: ORTHOPAEDIC SURGERY

## 2021-01-12 PROCEDURE — C1776 JOINT DEVICE (IMPLANTABLE): HCPCS | Performed by: ORTHOPAEDIC SURGERY

## 2021-01-12 PROCEDURE — 360N000077 HC SURGERY LEVEL 4, PER MIN: Performed by: ORTHOPAEDIC SURGERY

## 2021-01-12 PROCEDURE — 250N000009 HC RX 250: Performed by: NURSE ANESTHETIST, CERTIFIED REGISTERED

## 2021-01-12 PROCEDURE — 250N000011 HC RX IP 250 OP 636: Performed by: NURSE PRACTITIONER

## 2021-01-12 PROCEDURE — 27447 TOTAL KNEE ARTHROPLASTY: CPT | Mod: AS | Performed by: NURSE PRACTITIONER

## 2021-01-12 PROCEDURE — 250N000009 HC RX 250: Performed by: NURSE PRACTITIONER

## 2021-01-12 PROCEDURE — 272N000001 HC OR GENERAL SUPPLY STERILE: Performed by: ORTHOPAEDIC SURGERY

## 2021-01-12 PROCEDURE — 27447 TOTAL KNEE ARTHROPLASTY: CPT | Mod: LT | Performed by: ORTHOPAEDIC SURGERY

## 2021-01-12 PROCEDURE — 250N000013 HC RX MED GY IP 250 OP 250 PS 637: Performed by: ORTHOPAEDIC SURGERY

## 2021-01-12 PROCEDURE — 272N000002 HC OR SUPPLY OTHER OPNP: Performed by: ORTHOPAEDIC SURGERY

## 2021-01-12 PROCEDURE — 250N000011 HC RX IP 250 OP 636: Performed by: NURSE ANESTHETIST, CERTIFIED REGISTERED

## 2021-01-12 PROCEDURE — 999N000063 XR KNEE PORT LT 1/2 VW: Mod: LT

## 2021-01-12 PROCEDURE — 97530 THERAPEUTIC ACTIVITIES: CPT | Mod: GP | Performed by: PHYSICAL THERAPIST

## 2021-01-12 PROCEDURE — 370N000017 HC ANESTHESIA TECHNICAL FEE, PER MIN: Performed by: ORTHOPAEDIC SURGERY

## 2021-01-12 PROCEDURE — 250N000013 HC RX MED GY IP 250 OP 250 PS 637: Performed by: ORTHOTIST

## 2021-01-12 PROCEDURE — 710N000010 HC RECOVERY PHASE 1, LEVEL 2, PER MIN: Performed by: ORTHOPAEDIC SURGERY

## 2021-01-12 PROCEDURE — 278N000051 HC OR IMPLANT GENERAL: Performed by: ORTHOPAEDIC SURGERY

## 2021-01-12 PROCEDURE — 999N000141 HC STATISTIC PRE-PROCEDURE NURSING ASSESSMENT: Performed by: ORTHOPAEDIC SURGERY

## 2021-01-12 DEVICE — IMPLANTABLE DEVICE: Type: IMPLANTABLE DEVICE | Site: KNEE | Status: FUNCTIONAL

## 2021-01-12 DEVICE — BONE CEMENT GENTAMYCIN SMART SET GHV 5450-35-500: Type: IMPLANTABLE DEVICE | Site: KNEE | Status: FUNCTIONAL

## 2021-01-12 RX ORDER — CYCLOBENZAPRINE HCL 10 MG
10 TABLET ORAL 3 TIMES DAILY PRN
Status: DISCONTINUED | OUTPATIENT
Start: 2021-01-12 | End: 2021-01-13 | Stop reason: HOSPADM

## 2021-01-12 RX ORDER — FENTANYL CITRATE 50 UG/ML
25-50 INJECTION, SOLUTION INTRAMUSCULAR; INTRAVENOUS
Status: DISCONTINUED | OUTPATIENT
Start: 2021-01-12 | End: 2021-01-12 | Stop reason: HOSPADM

## 2021-01-12 RX ORDER — TIZANIDINE 2 MG/1
2-4 TABLET ORAL EVERY 6 HOURS PRN
Status: DISCONTINUED | OUTPATIENT
Start: 2021-01-12 | End: 2021-01-12

## 2021-01-12 RX ORDER — METOPROLOL TARTRATE 1 MG/ML
1-2 INJECTION, SOLUTION INTRAVENOUS EVERY 5 MIN PRN
Status: DISCONTINUED | OUTPATIENT
Start: 2021-01-12 | End: 2021-01-12 | Stop reason: HOSPADM

## 2021-01-12 RX ORDER — HYDROMORPHONE HYDROCHLORIDE 1 MG/ML
0.4 INJECTION, SOLUTION INTRAMUSCULAR; INTRAVENOUS; SUBCUTANEOUS
Status: DISCONTINUED | OUTPATIENT
Start: 2021-01-12 | End: 2021-01-13 | Stop reason: HOSPADM

## 2021-01-12 RX ORDER — DEXAMETHASONE SODIUM PHOSPHATE 10 MG/ML
INJECTION, SOLUTION INTRAMUSCULAR; INTRAVENOUS PRN
Status: DISCONTINUED | OUTPATIENT
Start: 2021-01-12 | End: 2021-01-12

## 2021-01-12 RX ORDER — CEFAZOLIN SODIUM 2 G/100ML
2 INJECTION, SOLUTION INTRAVENOUS EVERY 8 HOURS
Status: COMPLETED | OUTPATIENT
Start: 2021-01-12 | End: 2021-01-13

## 2021-01-12 RX ORDER — HYDROXYZINE HYDROCHLORIDE 25 MG/1
25 TABLET, FILM COATED ORAL EVERY 6 HOURS PRN
Status: DISCONTINUED | OUTPATIENT
Start: 2021-01-12 | End: 2021-01-13 | Stop reason: HOSPADM

## 2021-01-12 RX ORDER — LIDOCAINE 40 MG/G
CREAM TOPICAL
Status: DISCONTINUED | OUTPATIENT
Start: 2021-01-12 | End: 2021-01-12 | Stop reason: HOSPADM

## 2021-01-12 RX ORDER — NALOXONE HYDROCHLORIDE 0.4 MG/ML
0.4 INJECTION, SOLUTION INTRAMUSCULAR; INTRAVENOUS; SUBCUTANEOUS
Status: DISCONTINUED | OUTPATIENT
Start: 2021-01-12 | End: 2021-01-13 | Stop reason: HOSPADM

## 2021-01-12 RX ORDER — TRANEXAMIC ACID 650 MG/1
1950 TABLET ORAL ONCE
Status: COMPLETED | OUTPATIENT
Start: 2021-01-12 | End: 2021-01-12

## 2021-01-12 RX ORDER — HYDROXYZINE HYDROCHLORIDE 25 MG/1
25 TABLET, FILM COATED ORAL EVERY 6 HOURS PRN
Qty: 30 TABLET | Refills: 0 | Status: SHIPPED | OUTPATIENT
Start: 2021-01-12 | End: 2021-01-25

## 2021-01-12 RX ORDER — ACETAMINOPHEN 325 MG/1
975 TABLET ORAL EVERY 8 HOURS
Status: DISCONTINUED | OUTPATIENT
Start: 2021-01-12 | End: 2021-01-13 | Stop reason: HOSPADM

## 2021-01-12 RX ORDER — SODIUM CHLORIDE, SODIUM LACTATE, POTASSIUM CHLORIDE, CALCIUM CHLORIDE 600; 310; 30; 20 MG/100ML; MG/100ML; MG/100ML; MG/100ML
INJECTION, SOLUTION INTRAVENOUS CONTINUOUS
Status: DISCONTINUED | OUTPATIENT
Start: 2021-01-12 | End: 2021-01-12 | Stop reason: HOSPADM

## 2021-01-12 RX ORDER — OXYCODONE HYDROCHLORIDE 5 MG/1
5-10 TABLET ORAL
Status: DISCONTINUED | OUTPATIENT
Start: 2021-01-12 | End: 2021-01-13 | Stop reason: HOSPADM

## 2021-01-12 RX ORDER — LIDOCAINE 40 MG/G
CREAM TOPICAL
Status: DISCONTINUED | OUTPATIENT
Start: 2021-01-12 | End: 2021-01-13 | Stop reason: HOSPADM

## 2021-01-12 RX ORDER — NALOXONE HYDROCHLORIDE 0.4 MG/ML
0.2 INJECTION, SOLUTION INTRAMUSCULAR; INTRAVENOUS; SUBCUTANEOUS
Status: DISCONTINUED | OUTPATIENT
Start: 2021-01-12 | End: 2021-01-13 | Stop reason: HOSPADM

## 2021-01-12 RX ORDER — LIDOCAINE HYDROCHLORIDE 20 MG/ML
INJECTION, SOLUTION INFILTRATION; PERINEURAL PRN
Status: DISCONTINUED | OUTPATIENT
Start: 2021-01-12 | End: 2021-01-12

## 2021-01-12 RX ORDER — OXYCODONE HYDROCHLORIDE 5 MG/1
5-10 TABLET ORAL
Qty: 50 TABLET | Refills: 0 | Status: SHIPPED | OUTPATIENT
Start: 2021-01-12 | End: 2021-01-25

## 2021-01-12 RX ORDER — HYDROMORPHONE HCL IN WATER/PF 6 MG/30 ML
0.2 PATIENT CONTROLLED ANALGESIA SYRINGE INTRAVENOUS
Status: DISCONTINUED | OUTPATIENT
Start: 2021-01-12 | End: 2021-01-13 | Stop reason: HOSPADM

## 2021-01-12 RX ORDER — ONDANSETRON 2 MG/ML
4 INJECTION INTRAMUSCULAR; INTRAVENOUS EVERY 6 HOURS PRN
Status: DISCONTINUED | OUTPATIENT
Start: 2021-01-12 | End: 2021-01-13 | Stop reason: HOSPADM

## 2021-01-12 RX ORDER — CEFAZOLIN SODIUM 1 G/3ML
1 INJECTION, POWDER, FOR SOLUTION INTRAMUSCULAR; INTRAVENOUS SEE ADMIN INSTRUCTIONS
Status: DISCONTINUED | OUTPATIENT
Start: 2021-01-12 | End: 2021-01-12 | Stop reason: HOSPADM

## 2021-01-12 RX ORDER — FAMOTIDINE 20 MG/1
20 TABLET, FILM COATED ORAL 2 TIMES DAILY
Status: DISCONTINUED | OUTPATIENT
Start: 2021-01-12 | End: 2021-01-13 | Stop reason: HOSPADM

## 2021-01-12 RX ORDER — ONDANSETRON 2 MG/ML
INJECTION INTRAMUSCULAR; INTRAVENOUS PRN
Status: DISCONTINUED | OUTPATIENT
Start: 2021-01-12 | End: 2021-01-12

## 2021-01-12 RX ORDER — ONDANSETRON 4 MG/1
4 TABLET, ORALLY DISINTEGRATING ORAL EVERY 6 HOURS PRN
Status: DISCONTINUED | OUTPATIENT
Start: 2021-01-12 | End: 2021-01-13 | Stop reason: HOSPADM

## 2021-01-12 RX ORDER — BUPIVACAINE HYDROCHLORIDE AND EPINEPHRINE 5; 5 MG/ML; UG/ML
INJECTION, SOLUTION PERINEURAL PRN
Status: DISCONTINUED | OUTPATIENT
Start: 2021-01-12 | End: 2021-01-12

## 2021-01-12 RX ORDER — AMOXICILLIN 250 MG
1-2 CAPSULE ORAL 2 TIMES DAILY PRN
Qty: 30 TABLET | Refills: 0 | Status: SHIPPED | OUTPATIENT
Start: 2021-01-12 | End: 2021-02-22

## 2021-01-12 RX ORDER — CEFAZOLIN SODIUM IN 0.9 % NACL 3 G/100 ML
3 INTRAVENOUS SOLUTION, PIGGYBACK (ML) INTRAVENOUS
Status: COMPLETED | OUTPATIENT
Start: 2021-01-12 | End: 2021-01-12

## 2021-01-12 RX ORDER — BUPIVACAINE HYDROCHLORIDE 7.5 MG/ML
INJECTION, SOLUTION INTRASPINAL PRN
Status: DISCONTINUED | OUTPATIENT
Start: 2021-01-12 | End: 2021-01-12

## 2021-01-12 RX ORDER — ONDANSETRON 4 MG/1
4 TABLET, ORALLY DISINTEGRATING ORAL EVERY 30 MIN PRN
Status: DISCONTINUED | OUTPATIENT
Start: 2021-01-12 | End: 2021-01-12 | Stop reason: HOSPADM

## 2021-01-12 RX ORDER — FENTANYL CITRATE-0.9 % NACL/PF 10 MCG/ML
PLASTIC BAG, INJECTION (ML) INTRAVENOUS CONTINUOUS PRN
Status: DISCONTINUED | OUTPATIENT
Start: 2021-01-12 | End: 2021-01-12

## 2021-01-12 RX ORDER — DIMENHYDRINATE 50 MG/ML
25 INJECTION, SOLUTION INTRAMUSCULAR; INTRAVENOUS
Status: DISCONTINUED | OUTPATIENT
Start: 2021-01-12 | End: 2021-01-12 | Stop reason: HOSPADM

## 2021-01-12 RX ORDER — AMOXICILLIN 250 MG
1-2 CAPSULE ORAL 2 TIMES DAILY
Status: DISCONTINUED | OUTPATIENT
Start: 2021-01-12 | End: 2021-01-13 | Stop reason: HOSPADM

## 2021-01-12 RX ORDER — POLYETHYLENE GLYCOL 3350 17 G/17G
17 POWDER, FOR SOLUTION ORAL DAILY
Status: DISCONTINUED | OUTPATIENT
Start: 2021-01-13 | End: 2021-01-13 | Stop reason: HOSPADM

## 2021-01-12 RX ORDER — ACETAMINOPHEN 325 MG/1
975 TABLET ORAL EVERY 8 HOURS PRN
Qty: 100 TABLET | Refills: 1 | Status: SHIPPED | OUTPATIENT
Start: 2021-01-12 | End: 2021-06-24

## 2021-01-12 RX ORDER — PROCHLORPERAZINE MALEATE 5 MG
10 TABLET ORAL EVERY 6 HOURS PRN
Status: DISCONTINUED | OUTPATIENT
Start: 2021-01-12 | End: 2021-01-13 | Stop reason: HOSPADM

## 2021-01-12 RX ORDER — ONDANSETRON 2 MG/ML
4 INJECTION INTRAMUSCULAR; INTRAVENOUS EVERY 30 MIN PRN
Status: DISCONTINUED | OUTPATIENT
Start: 2021-01-12 | End: 2021-01-12 | Stop reason: HOSPADM

## 2021-01-12 RX ORDER — PROPOFOL 10 MG/ML
INJECTION, EMULSION INTRAVENOUS PRN
Status: DISCONTINUED | OUTPATIENT
Start: 2021-01-12 | End: 2021-01-12

## 2021-01-12 RX ORDER — HYDROMORPHONE HYDROCHLORIDE 1 MG/ML
.3-.5 INJECTION, SOLUTION INTRAMUSCULAR; INTRAVENOUS; SUBCUTANEOUS EVERY 5 MIN PRN
Status: DISCONTINUED | OUTPATIENT
Start: 2021-01-12 | End: 2021-01-12 | Stop reason: HOSPADM

## 2021-01-12 RX ORDER — SODIUM CHLORIDE, SODIUM LACTATE, POTASSIUM CHLORIDE, CALCIUM CHLORIDE 600; 310; 30; 20 MG/100ML; MG/100ML; MG/100ML; MG/100ML
INJECTION, SOLUTION INTRAVENOUS CONTINUOUS
Status: DISCONTINUED | OUTPATIENT
Start: 2021-01-12 | End: 2021-01-13 | Stop reason: HOSPADM

## 2021-01-12 RX ORDER — FENTANYL CITRATE 50 UG/ML
INJECTION, SOLUTION INTRAMUSCULAR; INTRAVENOUS PRN
Status: DISCONTINUED | OUTPATIENT
Start: 2021-01-12 | End: 2021-01-12

## 2021-01-12 RX ORDER — CALCIUM CARBONATE 500 MG/1
1000 TABLET, CHEWABLE ORAL 3 TIMES DAILY PRN
Status: DISCONTINUED | OUTPATIENT
Start: 2021-01-12 | End: 2021-01-13 | Stop reason: HOSPADM

## 2021-01-12 RX ORDER — PROPOFOL 10 MG/ML
INJECTION, EMULSION INTRAVENOUS CONTINUOUS PRN
Status: DISCONTINUED | OUTPATIENT
Start: 2021-01-12 | End: 2021-01-12

## 2021-01-12 RX ADMIN — HYDROMORPHONE HYDROCHLORIDE 0.5 MG: 1 INJECTION, SOLUTION INTRAMUSCULAR; INTRAVENOUS; SUBCUTANEOUS at 13:14

## 2021-01-12 RX ADMIN — SODIUM CHLORIDE, POTASSIUM CHLORIDE, SODIUM LACTATE AND CALCIUM CHLORIDE: 600; 310; 30; 20 INJECTION, SOLUTION INTRAVENOUS at 17:44

## 2021-01-12 RX ADMIN — PHENYLEPHRINE HYDROCHLORIDE 100 MCG: 10 INJECTION INTRAVENOUS at 10:55

## 2021-01-12 RX ADMIN — OXYCODONE HYDROCHLORIDE 10 MG: 5 TABLET ORAL at 21:05

## 2021-01-12 RX ADMIN — Medication 10 ML: at 12:46

## 2021-01-12 RX ADMIN — FENTANYL CITRATE 50 MCG: 50 INJECTION INTRAMUSCULAR; INTRAVENOUS at 13:23

## 2021-01-12 RX ADMIN — PROPOFOL 50 MG: 10 INJECTION, EMULSION INTRAVENOUS at 10:38

## 2021-01-12 RX ADMIN — ONDANSETRON 4 MG: 2 INJECTION INTRAMUSCULAR; INTRAVENOUS at 10:43

## 2021-01-12 RX ADMIN — DEXAMETHASONE SODIUM PHOSPHATE 5 MG: 10 INJECTION, SOLUTION INTRAMUSCULAR; INTRAVENOUS at 12:46

## 2021-01-12 RX ADMIN — FENTANYL CITRATE 15 MCG: 50 INJECTION, SOLUTION INTRAMUSCULAR; INTRAVENOUS at 10:35

## 2021-01-12 RX ADMIN — CYCLOBENZAPRINE 10 MG: 10 TABLET, FILM COATED ORAL at 18:01

## 2021-01-12 RX ADMIN — CALCIUM CARBONATE (ANTACID) CHEW TAB 500 MG 1000 MG: 500 CHEW TAB at 22:16

## 2021-01-12 RX ADMIN — Medication 20 MCG/MIN: at 11:06

## 2021-01-12 RX ADMIN — CEFAZOLIN 3 G: 10 INJECTION, POWDER, FOR SOLUTION INTRAVENOUS; PARENTERAL at 10:37

## 2021-01-12 RX ADMIN — FENTANYL CITRATE 50 MCG: 50 INJECTION INTRAMUSCULAR; INTRAVENOUS at 13:10

## 2021-01-12 RX ADMIN — LIDOCAINE HYDROCHLORIDE 60 MG: 20 INJECTION, SOLUTION INFILTRATION; PERINEURAL at 10:37

## 2021-01-12 RX ADMIN — CEFAZOLIN SODIUM 2 G: 2 INJECTION, SOLUTION INTRAVENOUS at 17:44

## 2021-01-12 RX ADMIN — FAMOTIDINE 20 MG: 20 TABLET, FILM COATED ORAL at 21:04

## 2021-01-12 RX ADMIN — PROPOFOL 150 MCG/KG/MIN: 10 INJECTION, EMULSION INTRAVENOUS at 10:37

## 2021-01-12 RX ADMIN — MIDAZOLAM 1 MG: 1 INJECTION INTRAMUSCULAR; INTRAVENOUS at 10:28

## 2021-01-12 RX ADMIN — ACETAMINOPHEN 975 MG: 325 TABLET, FILM COATED ORAL at 16:14

## 2021-01-12 RX ADMIN — BUPIVACAINE HYDROCHLORIDE IN DEXTROSE 1.8 ML: 7.5 INJECTION, SOLUTION SUBARACHNOID at 10:34

## 2021-01-12 RX ADMIN — SODIUM CHLORIDE, POTASSIUM CHLORIDE, SODIUM LACTATE AND CALCIUM CHLORIDE: 600; 310; 30; 20 INJECTION, SOLUTION INTRAVENOUS at 09:23

## 2021-01-12 RX ADMIN — MIDAZOLAM 2 MG: 1 INJECTION INTRAMUSCULAR; INTRAVENOUS at 10:25

## 2021-01-12 RX ADMIN — FENTANYL CITRATE 35 MCG: 50 INJECTION, SOLUTION INTRAMUSCULAR; INTRAVENOUS at 10:29

## 2021-01-12 RX ADMIN — ASPIRIN 325 MG: 325 TABLET, DELAYED RELEASE ORAL at 21:06

## 2021-01-12 RX ADMIN — Medication 10 ML: at 12:45

## 2021-01-12 RX ADMIN — DEXAMETHASONE SODIUM PHOSPHATE 5 MG: 10 INJECTION, SOLUTION INTRAMUSCULAR; INTRAVENOUS at 12:45

## 2021-01-12 RX ADMIN — ACETAMINOPHEN 975 MG: 325 TABLET, FILM COATED ORAL at 22:16

## 2021-01-12 RX ADMIN — PHENYLEPHRINE HYDROCHLORIDE 100 MCG: 10 INJECTION INTRAVENOUS at 11:04

## 2021-01-12 RX ADMIN — SODIUM CHLORIDE, POTASSIUM CHLORIDE, SODIUM LACTATE AND CALCIUM CHLORIDE: 600; 310; 30; 20 INJECTION, SOLUTION INTRAVENOUS at 13:12

## 2021-01-12 RX ADMIN — TRANEXAMIC ACID 1950 MG: 650 TABLET ORAL at 09:08

## 2021-01-12 RX ADMIN — OXYCODONE HYDROCHLORIDE 10 MG: 5 TABLET ORAL at 18:01

## 2021-01-12 SDOH — HEALTH STABILITY: MENTAL HEALTH: CURRENT SMOKER: 1

## 2021-01-12 ASSESSMENT — LIFESTYLE VARIABLES: TOBACCO_USE: 1

## 2021-01-12 NOTE — PROGRESS NOTES
S- Transfered to room 272 from PACU.    B- Left total knee arthroplasy    A- Brief systems assessment: C/o left knee pressure. Dressing clean, dry, intact. Immobilizer in place. CMS intact. VSS, afebrile.  Lungs clear. Alert and oriented.      R- Transfered to room 272 from PACU per physician orders. Continue to monitor pt and update physician as needed.

## 2021-01-12 NOTE — ANESTHESIA PROCEDURE NOTES
Procedure note : intrathecal      Staff -   CRNA: Irene Reid APRN CRNA  Other Anesthesia Staff: Gini Fernandez  Performed By: CRNA and HUNG  Pre-Procedure    Location: OR    Procedure Times:1/12/2021 10:30 AM and 1/12/2021 10:35 AM  Pre-Anesthestic Checklist: patient identified, IV checked, risks and benefits discussed, informed consent, monitors and equipment checked and pre-op evaluation    Timeout  Correct Patient: Yes   Correct Procedure: Yes   Correct Site: Yes   Correct Laterality: N/A   Correct Position: Yes   Site Marked: N/A   .   Procedure Documentation  ASA 3  Diagnosis:total knee replacement.    Procedure: intrathecal, .   Patient Position:sitting Insertion Site:L3-4  (midline approach)     Patient Prep/Sterile Barriers; mask, sterile gloves, povidone-iodine 7.5% surgical scrub, patient draped.  .  Needle:  Spinal Needle (gauge): 25  Spinal/LP Needle Length (inches): 3.5 # of attempts: 1 and # of redirects:  Introducer used Introducer: 20 G .        Assessment/Narrative  Paresthesias: No.  .  .  clear CSF fluid removed . Time Injected:while sitting   Comments:  Spinal block placed by HUNG Etienne under the direct supervision of Irene Reid CRNA. No apparent complications noted.

## 2021-01-12 NOTE — ANESTHESIA CARE TRANSFER NOTE
Patient: Amol Peres    Procedure(s):  left total knee replacement    Diagnosis: Primary osteoarthritis of left knee [M17.12]  Diagnosis Additional Information: No value filed.    Anesthesia Type:   MAC, Peripheral Nerve Block, Spinal     Note:  Airway :Face Mask  Patient transferred to:PACU  Handoff Report: Identifed the Patient, Identified the Reponsible Provider, Reviewed the pertinent medical history, Discussed the surgical course, Reviewed Intra-OP anesthesia mangement and issues during anesthesia, Set expectations for post-procedure period and Allowed opportunity for questions and acknowledgement of understanding      Vitals: (Last set prior to Anesthesia Care Transfer)    CRNA VITALS  1/12/2021 1203 - 1/12/2021 1303      1/12/2021             Pulse:  79    SpO2:  96 %                Electronically Signed By: DARLEEN Heart CRNA  January 12, 2021  1:24 PM

## 2021-01-12 NOTE — ANESTHESIA PREPROCEDURE EVALUATION
Anesthesia Pre-Procedure Evaluation    Patient: Amol Peres   MRN: [id number] : 1965          Preoperative Diagnosis: Primary osteoarthritis of left knee [M17.12]    Procedure(s):  left total knee replacement    Past Medical History:   Diagnosis Date     Chondromalacia patellae of left knee 2014     CTS (carpal tunnel syndrome) 2014    right > left      GERD (gastroesophageal reflux disease)      Hiatal hernia      Hypertension 3/26/2010     Mild airflow obstruction on pulmonary function test 2016     Morbid obesity with body mass index of 40.0-44.9 in adult (H) 3/16/2016     Tobacco use disorder 3/16/2016     Past Surgical History:   Procedure Laterality Date     APPENDECTOMY  04/04/10     COLONOSCOPY N/A 10/16/2015    Procedure: COMBINED COLONOSCOPY, SINGLE OR MULTIPLE BIOPSY/POLYPECTOMY BY BIOPSY;  Surgeon: Marcial Colvin MD;  Location: [facility]     ESOPHAGOSCOPY, GASTROSCOPY, DUODENOSCOPY (EGD), COMBINED  2010    COMBINED ESOPHAGOSCOPY, GASTROSCOPY, DUODENOSCOPY (EGD), BIOPSY SINGLE OR MULTIPLE performed by PATRICE MORGAN at  GI     ESOPHAGOSCOPY, GASTROSCOPY, DUODENOSCOPY (EGD), COMBINED  2012    Procedure: COMBINED ESOPHAGOSCOPY, GASTROSCOPY, DUODENOSCOPY (EGD), BIOPSY SINGLE OR MULTIPLE;  ESOPHAGOSCOPY, GASTROSCOPY, DUODENOSCOPY WITH  SINGLE BIOPSY;  Surgeon: Patrice Morgan MD;  Location:  GI     ESOPHAGOSCOPY, GASTROSCOPY, DUODENOSCOPY (EGD), COMBINED N/A 10/16/2015    Procedure: COMBINED ESOPHAGOSCOPY, GASTROSCOPY, DUODENOSCOPY (EGD), BIOPSY SINGLE OR MULTIPLE;  Surgeon: Marcial Colvin MD;  Location: [facility]     ESOPHAGOSCOPY, GASTROSCOPY, DUODENOSCOPY (EGD), COMBINED N/A 2017    Procedure: COMBINED ESOPHAGOSCOPY, GASTROSCOPY, DUODENOSCOPY (EGD), BIOPSY SINGLE OR MULTIPLE;  Esophagogastroduodenoscopy, Biopsy by Biopsy;  Surgeon: Kevin Fung MD;  Location: [facility]     [facility] UGI ENDOSCOPY DIAG W BIOPSY  2009     [facility] UGI ENDOSCOPY,  SIMPLE EXAM  09/23/08       Anesthesia Evaluation     . Pt has had prior anesthetic. Type: General and MAC    No history of anesthetic complications          ROS/MED HX    ENT/Pulmonary:     (+)tobacco use, Current use 1ppd  packs/day  , . .    Neurologic:  - neg neurologic ROS     Cardiovascular:     (+) Dyslipidemia, hypertension----. : . . . :. . Previous cardiac testing Echodate:2/26/20results:Procedure  Stress Echo Complete. Optison (NDC #2713-3231) given intravenously.  _____________________________________________________________________________  __        Interpretation Summary  1. The patient exercised 9:00. Normal functional capacity for age.  2. The patient exhibited no chest pain during exercise.  3. This was a normal stress EKG with no evidence of stress-induced ischemia.  4. Normal left ventricular function and wall motion at rest and post-stress.  _____________________________________________________________________________  __     Stress  Exercise was stopped due to dyspnea.  There was a hypertensive BP response to exercise.  The patient exhibited no chest pain during exercise.  The patient exercised 9:00.  Target Heart Rate was achieved.  This was a normal stress EKG with no evidence of stress-induced ischemia.  Normal left ventricular function and wall motion at rest and post-stress.     Baseline  Resting ECG is normal.  Normal left ventricular function and wall motion at rest.     Stress Results      Protocol:  MN BELEM        Maximum Predicted HR:   164 bpm            Target HR: 139 bpm         % Maximum Predicted HR: 90 %           Stage  DurationHeart Rate  BP                 Comment                (mm:ss)   (bpm)       Stage 1   3:00      101   180/88       Stage 2   3:00      129   194/88       Stage 3   3:00      148   210/88RPP 64832, Orozco Score 9, FAC Average      RECOVERYR  6:00      84    168/88               Stress Duration:   9:00 mm:ss *        Recovery Time: 6:00 mm:ss          Maximum Stress HR: 148 bpm             METS:          10     Mitral Valve  The mitral valve is normal in structure and function.        Tricuspid Valve  The tricuspid valve is normal in structure and function. There is trace  tricuspid regurgitation.     Aortic Valve  The aortic valve is trileaflet with aortic valve sclerosis.     Pericardium  There is no pericardial effusion.date: results:ECG reviewed date:2/25/20 results:Sinus  Bradycardia   WITHIN NORMAL LIMITS date: results:          METS/Exercise Tolerance:  >4 METS   Hematologic:  - neg hematologic  ROS       Musculoskeletal:   (+) arthritis,  -       GI/Hepatic:     (+) GERD Asymptomatic on medication, hiatal hernia,       Renal/Genitourinary:  - ROS Renal section negative       Endo:     (+) Obesity, .      Psychiatric:  - neg psychiatric ROS       Infectious Disease:  - neg infectious disease ROS       Malignancy:      - no malignancy   Other:    - neg other ROS                      Physical Exam  Normal systems: cardiovascular, pulmonary and dental    Airway   Mallampati: II  TM distance: >3 FB  Neck ROM: full    Dental     Cardiovascular   Rhythm and rate: regular and normal      Pulmonary    breath sounds clear to auscultation            Lab Results   Component Value Date    WBC 5.6 12/31/2020    HGB 15.6 12/31/2020    HCT 45.7 12/31/2020     12/31/2020    SED 4 08/13/2009     12/31/2020    POTASSIUM 4.1 12/31/2020    CHLORIDE 107 12/31/2020    CO2 28 12/31/2020    BUN 16 12/31/2020    CR 1.17 12/31/2020    GLC 96 12/31/2020    TYLOR 8.0 (L) 12/31/2020    ALBUMIN 3.8 12/31/2020    PROTTOTAL 7.1 12/31/2020    ALT 32 12/31/2020    AST 22 12/31/2020    ALKPHOS 58 12/31/2020    BILITOTAL 1.3 12/31/2020    LIPASE 73 04/22/2011    AMYLASE <30 (L) 04/22/2011    TSH 1.84 03/16/2016       Preop Vitals  BP Readings from Last 3 Encounters:   01/04/21 138/88   12/31/20 130/76   12/02/20 132/70    Pulse Readings from Last 3 Encounters:   12/31/20 74  "  11/06/20 80   10/23/20 66      Resp Readings from Last 3 Encounters:   12/31/20 16   11/06/20 18   10/23/20 12    SpO2 Readings from Last 3 Encounters:   12/31/20 98%   11/06/20 97%   10/23/20 99%      Temp Readings from Last 1 Encounters:   12/31/20 97.5  F (36.4  C) (Temporal)    Ht Readings from Last 1 Encounters:   01/04/21 1.832 m (6' 0.13\")      Wt Readings from Last 1 Encounters:   01/04/21 129.3 kg (285 lb)    Estimated body mass index is 38.51 kg/m  as calculated from the following:    Height as of 1/4/21: 1.832 m (6' 0.13\").    Weight as of 1/4/21: 129.3 kg (285 lb).       Anesthesia Plan      History & Physical Review  History and physical reviewed and following examination; no interval change.    ASA Status:  2 .    NPO Status:  > 8 hours    Plan for MAC, Peripheral Nerve Block and Spinal with Intravenous and Propofol induction. Maintenance will be TIVA.  Reason for MAC:  Deep or markedly invasive procedure (G8)  PONV prophylaxis:  Ondansetron (or other 5HT-3) and Dexamethasone or Solumedrol  Plan for adductor canal nerve block for postoperative pain management. All risks and benefits were discussed, and patient verbalized understanding and wishes to proceed with nerve block. Paper consent obtained.     The patient is a current Smoker and patient smoked on day of surgery     Postoperative Care  Postoperative pain management:  IV analgesics, Oral pain medications, Neuraxial analgesia, Multi-modal analgesia and Peripheral nerve block (Single Shot).      Consents  Anesthetic plan, risks, benefits and alternatives discussed with:  Patient.  Use of blood products discussed: No .   .                 DARLEEN Heart CRNA  "

## 2021-01-12 NOTE — ANESTHESIA PROCEDURE NOTES
Procedure note : Adductor canal      Staff -   CRNA: Irene Reid APRN CRNA  Performed By: CRNA  Pre-Procedure    Location: post-op    Procedure Times:1/12/2021 12:39 PM and 1/12/2021 12:46 PM  Pre-Anesthestic Checklist: patient identified, IV checked, site marked, risks and benefits discussed, informed consent, monitors and equipment checked, pre-op evaluation, at physician/surgeon's request and post-op pain management    Timeout  Correct Patient: Yes   Correct Procedure: Yes   Correct Site: Yes   Correct Laterality: Yes   Correct Position: Yes   Site Marked: Yes   .   Procedure Documentation    Diagnosis:TOTAL KNEE REPLACEMENT.    Procedure: Adductor canal, left.   Patient Position:supine Local skin infiltrated with 3 mL of 2% lidocaine.    Ultrasound used to identify targeted nerve, plexus, or vascular marker and placed a needle adjacent to it., Ultrasound was used to visualize the spread of the anesthetic in close proximity to the above stated nerve. A permanent image is entered into the patient's record.  Patient Prep/Sterile Barriers; mask, sterile gloves, chlorhexidine gluconate and isopropyl alcohol.  .  Needle: insulated   Needle Gauge: 21.  Needle Length (millimeters) 100  Insertion Method: Single Shot.        Assessment/Narrative  Paresthesias: No.  Injection made incrementally with aspirations every 5 mL..  The placement was negative for: blood aspirated, painful injection and site bleeding.  Bolus given via needle..   Secured via.   Complications: none. Test dose of mL at. Test dose negative for signs of intravascular, subdural or intrathecal injection. Comments:  Adductor canal block placed by HUNG Etienne under the direct supervision of Irene Reid CRNA. Good visualization of the femoral artery and the local spread lateral to the artery.  No HR increase with injections and no other complications noted.  I will follow up with the pt if needed.

## 2021-01-12 NOTE — INTERVAL H&P NOTE
This H&P has been reviewed and there are no clinically significant changes in the patient s condition.  The patient was evaluated by myself as well as Arnulfo Reno prior to surgery. Will involve IM if medically indicated. The Patient is approved for the surgery and the stated surgical procedure is still clinically indicated.

## 2021-01-12 NOTE — OP NOTE
Procedure Date: 01/12/2021      PREOPERATIVE DIAGNOSIS:  Left knee primary osteoarthritis.      POSTOPERATIVE DIAGNOSIS:  Left knee primary osteoarthritis.      PROCEDURE PERFORMED:  Left total knee arthroplasty.      SURGEON:  Joel Quezada DO      FIRST ASSISTANT:  Sorin Bob NP (utilized throughout the procedure, assisting with soft tissue retraction, assisting with trial and final implant placement, and he provided skin closure.      ANESTHESIA:  Spinal with IV sedation.      COMPLICATIONS:  None.      ESTIMATED BLOOD LOSS:  Less than 10 mL      FLUIDS:  800 mL crystalloid.      TOURNIQUET TIME PRESSURE:  86 minutes at 300 mmHg.      COUNTS:  Correct.      DISPOSITION:  PACU in stable condition.      GROSS FINDINGS:  There was complete loss of articular cartilage throughout the medial compartment, especially the medial femoral condyle.  There was some full-thickness fissuring throughout the lateral compartment.      IMPLANTS:  Includes an Attune size 10 femur, size 9 tibial baseplate, a 5 mm polyethylene insert and a 41 mm patellar button.      INDICATIONS FOR PROCEDURE:  This is a pleasant 55-year-old male with complaints of knee pain.  He has had pain dating back now a couple of years, progressively worsening.  Pain impacts the quality of his life.  It causes him a limp.  He is having difficulty with work due to pain.  Unable to kneel.  He has had pain at rest, pain that wakes him at night.  He has attempted multiple steroid injections, rest, activity modification, anti-inflammatories.  He presented with radiographs, which showed some preserved articular space, but he also had an MRI, which confirmed the setting of the arthritis.  We discussed his options.  We discussed arthroscopy, partial knee replacement and total knee replacement.  We reviewed the limitations of the pros and cons of all the procedures.  Once this was thoroughly reviewed with him, he opted to proceed with total knee replacement.  Risks  and benefits were discussed.  Timeframe for recovery reviewed.  Once discussed, he opted to proceed.      DETAILS OF PROCEDURE:  He was met preoperatively.  Again informed consent was verified, appropriate extremity was marked, and he was wheeled to operative suite #1.  Transferred to the OR table without any issues.  When deemed appropriate by Anesthesia, left lower extremity was sterilely prepped and draped in normal manner.  Prior to incision, a timeout was performed.  Again, the appropriate patient, surgery and extremity were verified and antibiotics had been administered.  Esmarch utilized to exsanguinate the extremity, tourniquet was inflated on his upper thigh.  A midline skin incision was followed with a medial parapatellar arthrotomy.  The anterior fat pad was excised.  Rimming osteophytes were removed.  The knee was flexed up.  Collateral ligament retractors were placed and maintained through the key components.  A drill was utilized to enter the distal intramedullary canal of the femur with no issues.  The contents were very gradually and easily aspirated.  The distal guide set to take 10 mm off distal in 5 degrees in valgus was placed.  The distal cut on the femur, both medial and lateral aspect was performed with no problems.  Attention was turned to the tibia.  The ACL was excised.  Posterior retractor was placed.  The tibia was presented.  Using the extramedullary device set to take 4 mm off the low side, with care to recreate the appropriate varus and valgus alignment and slope, the jig was pinned into position.  With this confirmed, the cut on the proximal tibia was performed with no issues.  The knee was brought out to extension.  A spacer block was placed and showed a balanced resection in full extension.  A drop shaji was placed, which showed acceptable alignment.  The knee was flexed up to 90 degrees.  A tibial baseplate was placed on the tibia.  Again, a drop shaji was placed showing acceptable  resection.  Attention was turned to the femur.  The sizing guide was placed.  Rotation was based on the Rome line and the epicondylar axis.  Pins were placed and measured a size 10.  The size 10, 4-in-1 block was placed.  The 90-degree flexion space under the block was consistent with his extension space.  The 4-in-1 cuts were then performed with no issues.  This was followed with a box cut.  Excess bone and meniscal remnants were removed.  Trial components were placed.  He had 0-125 degrees of motion.  Patella tracked with no thumbs technique through the full arc of motion.  The knee was balanced through the full arc of motion.  The patella was then resurfaced back down to anatomic dimensions using a freehand technique and again it was taken through full range of motion, showing normal tracking.  The tibia and femur were then prepared.  The final components as listed above were cemented in position, held in approximately 20 degrees of flexion until the cement cured.  During this process, a dilute Betadine lavage was performed for 3 minutes followed with pulse lavage.  The arthrotomy was then closed with 0 Vicryl, followed with 2-0 Vicryl subcutaneous, followed with running Monocryl suture and skin glue.  Sterile bandage was applied.  He was subsequently transferred to PACU in stable condition.  He will be brought to the hospital for orthopedic care, DVT prophylaxis, pain management, and physical therapy.         TOMY SAUCEDO DO             D: 2021   T: 2021   MT: JACKSON      Name:     MARÍA KEARNEY   MRN:      [id number]        Account:        [id number]   :      1965           Procedure Date: 2021      Document: [id number]

## 2021-01-12 NOTE — PROGRESS NOTES
01/12/21 1500   Quick Adds   Type of Visit Initial PT Evaluation       Present no   Living Environment   People in home spouse;child(tc), adult   Current Living Arrangements mobile home   Home Accessibility stairs to enter home   Number of Stairs, Main Entrance 3   Stair Railings, Main Entrance railings on both sides of stairs;railings safe and in good condition   Transportation Anticipated family or friend will provide  (jeep)   Living Environment Comments wife able to assist. step over tub. flat bed. plans to rest in the recliner.   Self-Care   Usual Activity Tolerance good   Current Activity Tolerance fair   Regular Exercise No   Equipment Currently Used at Home none   Disability/Function   Hearing Difficulty or Deaf no   Wear Glasses or Blind yes   Vision Management reading glasses   Concentrating, Remembering or Making Decisions Difficulty no   Difficulty Communicating no   Difficulty Eating/Swallowing no   Walking or Climbing Stairs Difficulty yes   Dressing/Bathing Difficulty no   Toileting issues no   Doing Errands Independently Difficulty (such as shopping) yes   Fall history within last six months no   General Information   Onset of Illness/Injury or Date of Surgery 01/12/21   Referring Physician Dr. Quezada   Patient/Family Therapy Goals Statement (PT) home with wife's assistance   Pertinent History of Current Problem (include personal factors and/or comorbidities that impact the POC) Patient is a 55 year old male, day 0 status post left TKA by Dr. Quezada with spinal nerve block and adductor nerve block. Patient with a previous medical history of obesity, lipoma of skin, HTN, hyperlipidemia and capal tunnel syndrome.    Existing Precautions/Restrictions weight bearing;fall   Weight-Bearing Status - LUE full weight-bearing   Weight-Bearing Status - RUE full weight-bearing   Weight-Bearing Status - LLE weight-bearing as tolerated   Weight-Bearing Status - RLE full weight-bearing    General Observations PT orders: eval and treat post operative knee. Activity orders: ambulate, up in chair, ice, elevate, knee immobilizer   Cognition   Orientation Status (Cognition) oriented x 4   Affect/Mental Status (Cognition) WNL   Follows Commands (Cognition) WNL   Pain Assessment   Patient Currently in Pain Yes, see Vital Sign flowsheet  (8/10 pressure pain throughout the knee)   Integumentary/Edema   Integumentary/Edema Comments post operative dressing CDI   Posture    Posture Forward head position   Range of Motion (ROM)   ROM Comment right knee poor tolerance to ROM, 0-10 degrees with severe pain PROM and unable to tolerate more   Strength   Strength Comments poor quad engagement, able to ankle pump however overall poor motor function   Bed Mobility   Bed Mobility scooting/bridging;sit-supine;supine-sit   Scooting/Bridging [place] (Bed Mobility) modified independence   Supine-Sit [place] (Bed Mobility) modified independence   Sit-Supine [place] (Bed Mobility) modified independence   Bed Mobility Limitations decreased ability to use legs for bridging/pushing   Impairments Contributing to Impaired Bed Mobility decreased strength;decreased ROM;pain   Assistive Device (Bed Mobility)   (knee immobilizer)   Comment (Bed Mobility) able to sit EOB, unable to control LLE thus did not progress mobility   Balance   Balance Comments IND short sitting balance. unable to assess standing balance   Sensory Examination   Sensory Perception Comments LLE impaired throughout limb to light touch and moderate touch   Clinical Impression   Criteria for Skilled Therapeutic Intervention yes, treatment indicated   PT Diagnosis (PT) muscle weakness, muscle tension, joint stiffness, impaired gait, impaired balance   Influenced by the following impairments day 0 status post left TKA   Functional limitations due to impairments pain, increased assistance for low level mobility, knee immobilizer for knee support    Clinical Presentation Evolving/Changing   Clinical Presentation Rationale severe pain with activity, poor motor control, post operative status   Clinical Decision Making (Complexity) moderate complexity   Therapy Frequency (PT) 2x/day   Predicted Duration of Therapy Intervention (days/wks) 2 days   Planned Therapy Interventions (PT) balance training;home exercise program;gait training;ROM (range of motion);strengthening;stair training   Anticipated Equipment Needs at Discharge (PT) walker, rolling   Risk & Benefits of therapy have been explained evaluation/treatment results reviewed;care plan/treatment goals reviewed;patient   Clinical Impression Comments Patient presents with signs and symptoms consistent with post operative TKA and intact spinal nerve block. Anticipate progression to discharge home with skilled PT intervention, medical management and improved pain control. Patient would benefit from skilled PT intervention to address the above impairments and progress him towards greater functional mobility and safety in accodance to her surgeon's protocol.   PT Discharge Planning    PT Discharge Recommendation (DC Rec) home with assist;home with home care physical therapy   PT Rationale for DC Rec Patient s/p TKA with impaired mobility, pain and motor dysfunction. Anticipate patient to make good progress towards discharge home with improved pain control and increased mobilization with nursing staff. Patient with adequate assistance at home for safe discharge. Patient would benefit from skilled physical therapy intervention to progress patient to greater functional mobility and safety in accordance with his surgeon's protocol.   PT Brief overview of current status  **DME order for 2WW** IND bed mobility, knee PROM 0-5 with severe pain. Min assist sit to supine, IND short sitting balance. MOD IND bridge and boost up in bed. Severe knee pain and press, poor quad control unable to SLR or quad set.    Total  Evaluation Time   Total Evaluation Time (Minutes) 15     Thank you for your referral.  Shi Early, PT, DPT, ATC    [facility]  O: [phone]  E: [email or web address]

## 2021-01-12 NOTE — BRIEF OP NOTE
[facility] Brief Operative Note    Pre-operative diagnosis: Primary osteoarthritis of left knee [M17.12]   Post-operative diagnosis: Same   Procedure: Procedure(s):  left total knee replacement   Surgeon:  Anesthesia: Joel Quezada DO  Spinal   Assistant(s): Sorin Bob APRN, CNP, DNP (A advanced practice provider was necessary for his expertise, exposure and surgical assistance throughout the case.)   Estimated blood loss:  Tourniquet time/pressure:  Urine output:  Fluids: Less than 10 ml  86 minutes at 300 mmHg  na  800 ml Crystalloids   Specimens: None   Findings: left knee primary osteoarthritis [id number]     Richard Quezada D.O.      Implants:   Implant Name Type Inv. Item Serial No.  Lot No. LRB No. Used Action   BONE CEMENT GENTAMYCIN SMART SET V 5450- Cement, Bone BONE CEMENT GENTAMYCIN SMART SET GHV [id number]  [id number] Left 2 Implanted   Attune pinning system    Depuy [id number] Left 1 Used as a Supply   Attune Patella Medialized    Depuy [id number] Left 1 Implanted   Attune Tibail base rotating platform    Depuy [id number] Left 1 Implanted   Attune Femoral Posterioir stablized     Depuy [id number] Left 1 Implanted   Attune Tibial Insert Rotating platform posterioir stablized     Depuy [id number] Left 1 Implanted

## 2021-01-12 NOTE — DISCHARGE INSTRUCTIONS
Total Knee Replacement Discharge Instructions                                     [phone]  Bone and Joint Service Line for issues or concerns    General Care:  After surgery you may feel tired/sleepy. This is normal. If you have any question along the way please contact the office. If you feel it is an issue cannot wait for normal office hours, contact the 24 hour bone and joint line at [phone]. You should not drive or operate machines/equipment until released by your physician to do so.     Wound Care:  Keep incision covered with hospital dressing (Aquacel) for one week. It is okay to shower with this dressing on. However, do not submerge your dressing and incision in water for roughly 2 weeks. After one week remove this dressing and then keep it clean, dry, and covered. If this dressing become looses or falls off it is ok to cover with gauze and tape.  Wash the incision gently with warm soapy water after removing your hospital dressing and lightly pat dry.       Diet:  Start with non-alcoholic liquids at first, particularly water or sports drinks after surgery. Progress to bland foods such as crackers and bread and finally to your normal diet if you have no problems. Avoid alcohol when taking narcotic pain medications.      Pain control:  Take your pain medications as prescribed. These medications may make you sleepy. Do not drive, operate equipment, or drink alcohol when taking these.  You may take Tylenol (Generic name is acetaminophen) as directed on the bottle for additional relief or in place of the prescribed pain medications as your pain gets better. Do not take any other NSAIDs (Motrin, Ibuprofen, Aleve, Naproxen) while taking the blood thinner. If the medications cause a reaction such as nausea or skin rash, stop taking them and contact your doctor. Please plan accordingly, pain medications will not be re-filled on the weekends or at night. Call the office during the day if you need more  medications.    Blood thinner:  It is very important to take it as prescribed. It is a medication to help prevent blood clots in your legs or lungs. No medication is perfect, so if you notice a sudden onset of pain/swelling in your calf area call your doctor. If you notice a sudden onset of troubles breathing and/or chest pain call 911.     Swelling (edema) control:  Preventing swelling (edema) in your legs after surgery is very important. It is helpful for achieving optimal range of motion as well as preventing blood clots.  It starts with simple things such as elevation of your legs and icing. Elevate your legs above your heart.    Do this for 20 minutes every couple hours the first few days after surgery. We also recommend YAZ hose (compression hose) to be worn. Wear on both legs during the day. You may remove at night. Wear these until directed to stop.      Icing:  It is common for some swelling, aching and stiffness to occur for up to 6-9 months after a knee replacement. If you knee swells, get off your feet, elevate your leg and apply some ice packs. Apply for 20 minutes at a time. For the first 1-2 weeks apply ice 2-3 x day or more after therapy.    Walker/crutches:  Use a walker/crutches when you go home. You will transition to the use of a cane and finally to no additional support.     Braces:  You will go home with a knee immobilizer. You can take it off when you are lying or sitting down. Wear it when you are walking. This immobilizer can come off after you are doing a straight leg raise. Your physician and or physical therapist will help to determine when to stop wearing it.     Physical Therapy:  The success of your knee replacement is based on doing physical therapy. You will have some pain and discomfort along the way. If you feel your pain is limiting your progress make sure to take some pain medication prior to your therapy session. If you pain medications are not working talk to your surgeon.    For the first 2 weeks after going home you will have in-home physical therapy. The goal is to work on range of motion. While it is important to working on bending your knee, it is equally important to make sure you knee comes out all the way straight. To assist in this do not place any bumps, pillows and or blankets under your knee when you are lying down. You should have an outpatient physical therapy appointment scheduled for about 2 weeks after surgery.     Activity:  Unless otherwise instructed, you can weight bear as tolerated. While laying or sitting down you should straighten your knee all the way out and then gently work on bending the knee back. Do not worry at first if your knee feels stiff and will not bend like normal, this will get better. Never put a pillow or bump under you knee, instead put a pillow under your ankle so your knee will straighten out all the way.     Normal findings after surgery:  Numbness and tenderness around the incisions and to the outside of the incision is normal.  You may have bruising around the incisions and down the lower leg.   Your knee will be swollen for months after surgery. It will feel  tight  to move.   Low grade fevers less than 100.5 degrees Fahrenheit are normal.   You may have some minor swelling in the leg/calf area.  You will have some increased pain after your therapy sessions.     When to call the Office:  Temperature greater than 101.5 degrees Fahreheit.  Fever, chills, and increasing pain in the knee.  Excessive drainage from the incisions that include bright red blood.  Drainage from the incisions sites that appear yellow, pus-like, or foul smelling.  Increasing pain the knee not relieved by the prescribed pain medications or ice.  Persistent nausea or vomiting not helped by the Zofran.  Increased pain or swelling in your calf area (in back above your ankle) that wasn t there when in the hospital.  Any other effects you feel are significant.  Call 911 if  you experience any chest pain and/or shortness or breath.

## 2021-01-12 NOTE — PROGRESS NOTES
Saw and examined patient.  POD0 s/p total knee replacement, left  Per patient doing very well. Moderate pain, working on pain control.  No current nausea toleraing oral intact, will be advancing diet as tolerated.  Has not voided yet, DTV. Has not stood at bedside yet, spinal not worn off yet.  Patient has 24/7 help available upon discharge.  No current concerns.    Answered all questions.     /70 (BP Location: Right arm)   Pulse 69   Temp 97.6  F (36.4  C) (Oral)   Resp 14   SpO2 96%     Alert and orient x 4, sitting at side of bed in NAD, working with physical therapy.   Dressing CDI.  Immobilizer in place.   Sensation, motor intact to foot.  Toes well perfused. D.p. Pulse 2+    Plan: will see again tomorrow Am.   Plan for discharge tomorrow afternoon pending progress.    PCP recommended IM rounder for patient.  However, patient has past hx is notable for GERD and hyperlipidemia. Does have Pepcid BID ordered, medical conditions stable at this time. Did not order for hospitalist consult at this time. If there are concerns will place order.     DARLEEN Esparza, CNP  Orthopedic Surgery

## 2021-01-12 NOTE — OR NURSING
Transfer from  PACU to Room 272  Transferred to bed via glyder mat    S: 56 y/o male  S/P left total knee replacement       Anesthesia Type:  Spinal with adductor canal block       Surgeon:  Dr. Quezada       Allergies:  See Medication Reconciliation Record       DNR: no    B:  Pertinent Medical History:   Past Medical History:   Diagnosis Date     Chondromalacia patellae of left knee 11/28/2014     CTS (carpal tunnel syndrome) 11/28/2014    right > left      GERD (gastroesophageal reflux disease)      Hiatal hernia 2008     Hypertension 3/26/2010     Mild airflow obstruction on pulmonary function test 4/6/2016     Morbid obesity with body mass index of 40.0-44.9 in adult (H) 3/16/2016     Tobacco use disorder 3/16/2016              Surgical History:    Past Surgical History:   Procedure Laterality Date     APPENDECTOMY  04/04/10     COLONOSCOPY N/A 10/16/2015    Procedure: COMBINED COLONOSCOPY, SINGLE OR MULTIPLE BIOPSY/POLYPECTOMY BY BIOPSY;  Surgeon: Marcial Colvin MD;  Location: [facility]     ESOPHAGOSCOPY, GASTROSCOPY, DUODENOSCOPY (EGD), COMBINED  12/27/2010    COMBINED ESOPHAGOSCOPY, GASTROSCOPY, DUODENOSCOPY (EGD), BIOPSY SINGLE OR MULTIPLE performed by JUSTINA CAR at [facility]     ESOPHAGOSCOPY, GASTROSCOPY, DUODENOSCOPY (EGD), COMBINED  12/26/2012    Procedure: COMBINED ESOPHAGOSCOPY, GASTROSCOPY, DUODENOSCOPY (EGD), BIOPSY SINGLE OR MULTIPLE;  ESOPHAGOSCOPY, GASTROSCOPY, DUODENOSCOPY WITH  SINGLE BIOPSY;  Surgeon: Justina Car MD;  Location: [facility]     ESOPHAGOSCOPY, GASTROSCOPY, DUODENOSCOPY (EGD), COMBINED N/A 10/16/2015    Procedure: COMBINED ESOPHAGOSCOPY, GASTROSCOPY, DUODENOSCOPY (EGD), BIOPSY SINGLE OR MULTIPLE;  Surgeon: Marcial Colvin MD;  Location: [facility]     ESOPHAGOSCOPY, GASTROSCOPY, DUODENOSCOPY (EGD), COMBINED N/A 9/1/2017    Procedure: COMBINED ESOPHAGOSCOPY, GASTROSCOPY, DUODENOSCOPY (EGD), BIOPSY SINGLE OR MULTIPLE;  Esophagogastroduodenoscopy, Biopsy by Biopsy;  Surgeon:  Kevin Fung MD;  Location: [facility]      UGI ENDOSCOPY DIAG W BIOPSY  8/28/2009      UGI ENDOSCOPY, SIMPLE EXAM  09/23/08       A:  EBL: 10ml         IVF:  1100ml        UOP:  No urge        NPO:  ___Yes _x__No         Vomiting:  ___Yes _x__No         Drainage: none noted        Skin Integrity: WDL except incision        RFO: ___Yes_x__No         SSI Patient?  ___Yes_x__No        Brace/sling/equipment:  __x_Yes -immobilizer, (not currently placed on pt)         See PACU record for ongoing assessment, vital signs and pain assessment.    R: Post-Op vitals and assessments as ordered/indicated per patient's condition.       Follow Post-Op orders and notify Physician prn.       Continue to involve patient/family in plan of care and discharge planning.       Reinforce Pre-Operative education.       Implement skin safety interventions as appropriate.    Telephone report given to ERNIE Beltran med-surge

## 2021-01-13 ENCOUNTER — APPOINTMENT (OUTPATIENT)
Dept: PHYSICAL THERAPY | Facility: CLINIC | Age: 56
End: 2021-01-13
Attending: ORTHOPAEDIC SURGERY
Payer: COMMERCIAL

## 2021-01-13 VITALS
DIASTOLIC BLOOD PRESSURE: 67 MMHG | RESPIRATION RATE: 20 BRPM | OXYGEN SATURATION: 95 % | HEART RATE: 67 BPM | SYSTOLIC BLOOD PRESSURE: 141 MMHG | TEMPERATURE: 98 F

## 2021-01-13 LAB
GLUCOSE SERPL-MCNC: 130 MG/DL (ref 70–99)
HGB BLD-MCNC: 14.6 G/DL (ref 13.3–17.7)

## 2021-01-13 PROCEDURE — 97530 THERAPEUTIC ACTIVITIES: CPT | Mod: GP | Performed by: PHYSICAL THERAPIST

## 2021-01-13 PROCEDURE — 250N000013 HC RX MED GY IP 250 OP 250 PS 637: Performed by: NURSE PRACTITIONER

## 2021-01-13 PROCEDURE — 82947 ASSAY GLUCOSE BLOOD QUANT: CPT | Performed by: NURSE PRACTITIONER

## 2021-01-13 PROCEDURE — 36415 COLL VENOUS BLD VENIPUNCTURE: CPT | Performed by: NURSE PRACTITIONER

## 2021-01-13 PROCEDURE — 97110 THERAPEUTIC EXERCISES: CPT | Mod: GP | Performed by: PHYSICAL THERAPIST

## 2021-01-13 PROCEDURE — 85018 HEMOGLOBIN: CPT | Performed by: NURSE PRACTITIONER

## 2021-01-13 PROCEDURE — 97116 GAIT TRAINING THERAPY: CPT | Mod: GP | Performed by: PHYSICAL THERAPIST

## 2021-01-13 PROCEDURE — 250N000011 HC RX IP 250 OP 636: Performed by: NURSE PRACTITIONER

## 2021-01-13 RX ORDER — NAPROXEN 500 MG/1
500 TABLET ORAL 2 TIMES DAILY WITH MEALS
Qty: 30 TABLET | Refills: 0 | Status: SHIPPED | OUTPATIENT
Start: 2021-02-24 | End: 2021-01-25

## 2021-01-13 RX ADMIN — OXYCODONE HYDROCHLORIDE 10 MG: 5 TABLET ORAL at 08:32

## 2021-01-13 RX ADMIN — ASPIRIN 325 MG: 325 TABLET, DELAYED RELEASE ORAL at 08:33

## 2021-01-13 RX ADMIN — ACETAMINOPHEN 975 MG: 325 TABLET, FILM COATED ORAL at 06:44

## 2021-01-13 RX ADMIN — POLYETHYLENE GLYCOL 3350 17 G: 17 POWDER, FOR SOLUTION ORAL at 08:32

## 2021-01-13 RX ADMIN — OXYCODONE HYDROCHLORIDE 10 MG: 5 TABLET ORAL at 04:42

## 2021-01-13 RX ADMIN — FAMOTIDINE 20 MG: 20 TABLET, FILM COATED ORAL at 08:32

## 2021-01-13 RX ADMIN — CEFAZOLIN SODIUM 2 G: 2 INJECTION, SOLUTION INTRAVENOUS at 02:40

## 2021-01-13 RX ADMIN — DOCUSATE SODIUM AND SENNOSIDES 2 TABLET: 8.6; 5 TABLET ORAL at 08:32

## 2021-01-13 NOTE — PROGRESS NOTES
[facility]  Orthopedics Progress Note           Assessment and Plan:    Assessment:   Post-operative day #1 Procedure(s):  left total knee replacement by Dr. Quezada        Plan:   Encourage IS  Start or continue physical therapy  Activity as tolerated  Weight-bear as tolerated L LE  Discharge from hospital today.  Pain control measures, on oxycodone Tylenol.  No muscle relaxant, except flexeril one dose. Will send with atarax if he needs some at home.   Advance diet as tolerated  Routine wound care, RN to change dressing to Aquacel prior to discharge  DVT Prophylaxis: Asprin 325mg BID x 6 weeks   PT:Recommend discharge to home with HC PT  Case Management:No note seen yet, but looking for HC PT set up if possible.   No acute medical issues.  Discharge to Home with HC PT Today            Interval History:   Doing well.  Continues to improve.  Pain is well-controlled.  No fevers.  Pain well-controlled.  Patient states he is ready for discharge.  Discussed wound care and Aquacel bandage as well as surgery and follow-up and closure of wound and in-home physical therapy.           Review of Systems:    The patient denies any chest pain, shortness of breath, excessive pain, fever, chills, purulent drainage from the wound, nausea or vomiting.               Physical Exam:   General: awake, alert, appropriate and in no acute distress  Blood pressure (!) 150/78, pulse 76, temperature 97.8  F (36.6  C), temperature source Oral, resp. rate 20, SpO2 96 %.  Hemoglobin   Date Value Ref Range Status   01/13/2021 14.6 13.3 - 17.7 g/dL Final     No results found for: INR  Left knee:  Dressing clean, dry and intact.  Patient in Ace wrap from foot to upper thigh.  Surrounding skin intact, no breakdown.  Calves are soft, nontender with no significant swelling. Distal neurovascular grossly intact.  Compartments soft and non-tender.  2+ distal pulse, sensation intact to foot, able to df/pf against resistance. Brisk cap  refill in all toes  5 out of 5 plantar flexion dorsiflexion FHL and EHL  Able to do a straight leg raise           Data:     Results for orders placed or performed during the hospital encounter of 01/12/21 (from the past 24 hour(s))   XR Knee Port Left 1/2 Views    Narrative    LEFT KNEE PORTABLE ONE TO TWO VIEWS  1/12/2021 1:08 PM     HISTORY: Postop total knee. Status post total knee replacement using  cement, right.    COMPARISON: MRI left knee dated 11/10/2020, plain films of the knees  dated 3/4/2020.    FINDINGS:  There has been a left knee arthroplasty.  Alignment is  anatomic.  Hardware components are well seated and demonstrate no  evidence for periprosthetic fracture or hardware failure. Tiny osseous  fragments along the femoral prosthesis near the medial and lateral  femoral condyles are likely postoperative in etiology. Mild edema is  seen in the subcutaneous adipose tissues. Gas and fluid are seen in  the joint space and in the soft tissues.       Impression    IMPRESSION:  Expected findings status post left knee arthroplasty.     JIMY MERCADO MD   Hemoglobin   Result Value Ref Range    Hemoglobin 14.6 13.3 - 17.7 g/dL   Glucose   Result Value Ref Range    Glucose 130 (H) 70 - 99 mg/dL

## 2021-01-13 NOTE — ANESTHESIA POSTPROCEDURE EVALUATION
Patient: Amol Peres    Procedure(s):  left total knee replacement    Diagnosis:Primary osteoarthritis of left knee [M17.12]  Diagnosis Additional Information: No value filed.    Anesthesia Type:  MAC, Peripheral Nerve Block, Spinal    Note:  Anesthesia Post Evaluation    Patient location during evaluation: Bedside  Patient participation: Able to fully participate in evaluation  Level of consciousness: awake and alert  Pain management: adequate  Airway patency: patent  Cardiovascular status: acceptable and stable  Respiratory status: acceptable and room air  Hydration status: acceptable  PONV: none     Anesthetic complications: None    Comments:  Patient was happy with the anesthesia care received and no anesthesia related complications were noted. Patient reported minimal pain overnight and stated that he has been able to participate in physical therapy and ADLs as expected. I will follow up with the patient again if it is needed.        Last vitals:  Vitals:    01/12/21 1615 01/12/21 1800 01/13/21 0728   BP: 138/80 (!) 150/78 (!) 141/67   Pulse: 71 76 67   Resp: 20 20 20   Temp: 97.8  F (36.6  C) 97.8  F (36.6  C) 98  F (36.7  C)   SpO2: 96% 96% 95%         Electronically Signed By: DARLEEN Heart CRNA  January 13, 2021  9:20 AM

## 2021-01-13 NOTE — PLAN OF CARE
Physical Therapy Discharge Summary    Reason for therapy discharge:    Discharged to home with home therapy.    Progress towards therapy goal(s). See goals on Care Plan in [facility] electronic health record for goal details.  Goals met    Therapy recommendation(s):    Continued therapy is recommended.  Rationale/Recommendations:   . Patient would benefit from continued skilled therapeutic intervention in order to progress them towards a higher level of function in accordance with their surgeon's protocol.    Thank you for your referral.  Shi Early, PT, DPT, ATC    [facility]  O: [phone]  E: [email or web address]

## 2021-01-13 NOTE — PROGRESS NOTES
S-(situation): 4 hour shift note    B-(background): Left total knee arthroplasty    A-(assessment): Pt alert and oriented x4, capnography with in normal limits, CMS in tact throughout, Pain well controled with Prn oxycodone and scheduled tylenol. Pt tolerating adequate oral in take, voids well with no complication. Up to the bathroom with very limited assist of one and a walker, No signs of bleeding at this time.     R-(recommendations): Continue to monitor CMS , output, control pain, and follow plan of care.

## 2021-01-13 NOTE — PROGRESS NOTES
S-(situation): shift note    B-(background): Left total knee arthroplasty    A-(assessment): Pt was able to get some restful sleep this shift, pain has been well controlled with scheduled tylenol and oxycodone, CMS remains intact throughout, Pt has been able to ambulate to the bathroom with minimal assist, Pain is worse with movement, Knee immobilizer on when out of bed.       R-(recommendations): Continue to monitor CMS, control pain, and follow plan of care.

## 2021-01-13 NOTE — CONSULTS
Care Management Initial Consult    General Information  Assessment completed with: Patient,    Type of CM/SW Visit: Initial Assessment    Primary Care Provider verified and updated as needed:     Readmission within the last 30 days:        Reason for Consult: discharge planning  Advance Care Planning:          Communication Assessment  Patient's communication style: spoken language (English or Bilingual)    Hearing Difficulty or Deaf: no   Wear Glasses or Blind: yes    Cognitive  Cognitive/Neuro/Behavioral: WDL  Level of Consciousness: alert  Arousal Level: opens eyes spontaneously                Living Environment:   People in home: spouse     Current living Arrangements: mobile home      Able to return to prior arrangements: yes     Family/Social Support:  Care provided by: self  Provides care for:    Marital Status:   Wife, Children  [place]       Description of Support System:         Current Resources:   Skilled Home Care Services:    Community Resources: None  Equipment currently used at home: none  Supplies currently used at home:      Employment/Financial:  Employment Status:          Financial Concerns:         Lifestyle & Psychosocial Needs:        Socioeconomic History     Marital status:      Spouse name: Not on file     Number of children: Not on file     Years of education: Not on file     Highest education level: Not on file     Tobacco Use     Smoking status: Current Every Day Smoker     Packs/day: 1.00     Smokeless tobacco: Never Used   Substance and Sexual Activity     Alcohol use: Yes     Comment: occasionally     Drug use: No     Sexual activity: Yes     Partners: Female     Functional Status:  Prior to admission patient needed assistance:          Mental Health Status:          Chemical Dependency Status:             Values/Beliefs:  Spiritual, Cultural Beliefs, Congregational Practices, Values that affect care: no               Additional Information:  Writer met with patient to discuss  discharge needs.  Patient states he is planning to discharge home today.  He will have assistance at home. Discussed home P/T and provided list of choices.  Patient is in agreement with home P/T and chooses [facility] (Phone: [phone]).  No other identified needs.    TIP [place]  [facility] [phone]/ [facility] [phone]  Care Management

## 2021-01-13 NOTE — PROGRESS NOTES
S: Patient discharged to home via w/c with wife    B: Observation goals met     A: Pain controlled with oxycodone and tylenol. Left knee aquacell dressing on, TEDS on CMS intact. VSS, afebrile. Eatin and drinking well. Good urine output.     R: Discharge instructions reviewed with patient. Listed belongings gathered and returned to patient.  Patient Education resolved: Yes  New medications-Pt. Has been educated about reason of use and side effects Yes  Home and hospital acquired medications returned to patient NA  Medication Bin checked and emptied on discharge Yes

## 2021-01-14 ENCOUNTER — HEALTH MAINTENANCE LETTER (OUTPATIENT)
Age: 56
End: 2021-01-14

## 2021-01-14 NOTE — PROGRESS NOTES
[facility]  Discharge Call Back  Amol Peres  1965  MRN: [id number]  Home: [phone] (home)   PCP: Arnulfo Damico    We are calling to see how you're doing since your surgery/procedure with us?   Comments good  Clinical Questions  1. Have you had time to look at your discharge instructions? Do you have any questions in regards to the instructions?   Comment: none  2. Do you feel your pain is being controlled with the regimen the surgeon sent you home on? (ie: prescription medications, over the counter pain medications, ice packs)   Comments:yes  3. Have you noticed any drainage on your dressing? Do you know what to do if you have bleeding as a result of your procedure?   Comments: no  4. Have you had any nausea/vomiting? Do you know how to treat this?   Comment: none  5. Have you had any signs/symptoms of infection? (ie: fever, swelling, heat, drainage or redness) Do you know what to do if you have?   Comment: none  6. Do you have a follow up appointment made with your surgeon? Do you have a number to contact them at if you need it?   Comment: yes        You may be randomly selected to fill out a [facility] survey. We would appreciate you taking the time to fill this out. It is important to us if you would answer all of the questions on the survey.

## 2021-01-15 ENCOUNTER — PATIENT OUTREACH (OUTPATIENT)
Dept: FAMILY MEDICINE | Facility: CLINIC | Age: 56
End: 2021-01-15
Payer: COMMERCIAL

## 2021-01-15 NOTE — PROGRESS NOTES
Clinic Care Coordination Contact  Community Health Worker Initial Outreach            Patient accepts CC: No, the patient declined care coordination at this time. Patient will be sent Care Coordination introduction letter for future reference.     The Clinic Community Health Worker spoke with the patient today at the request of the PCP to discuss possible Clinic Care Coordination enrollment. The service was described to the patient and immediate needs were discussed. The patient declined enrollment at this time. The PCP is encouraged to refer in the future if the patient's needs change.    The patient is doing really well since coming home. He has had barriers or concerns. His wife is helping him with the healing process.     Arelis Vera  Community Health Worker   [facility]  Care Coordination  [facility]  [email or web address]  [email or web address]   Office: [phone]  Fax: [phone]

## 2021-01-25 ENCOUNTER — OFFICE VISIT (OUTPATIENT)
Dept: ORTHOPEDICS | Facility: CLINIC | Age: 56
End: 2021-01-25
Payer: COMMERCIAL

## 2021-01-25 ENCOUNTER — ANCILLARY PROCEDURE (OUTPATIENT)
Dept: GENERAL RADIOLOGY | Facility: CLINIC | Age: 56
End: 2021-01-25
Attending: NURSE PRACTITIONER
Payer: COMMERCIAL

## 2021-01-25 ENCOUNTER — HOSPITAL ENCOUNTER (OUTPATIENT)
Dept: ULTRASOUND IMAGING | Facility: CLINIC | Age: 56
Discharge: HOME | End: 2021-01-25
Attending: NURSE PRACTITIONER | Admitting: NURSE PRACTITIONER
Payer: COMMERCIAL

## 2021-01-25 VITALS
SYSTOLIC BLOOD PRESSURE: 120 MMHG | WEIGHT: 285 LBS | BODY MASS INDEX: 38.6 KG/M2 | TEMPERATURE: 97.2 F | DIASTOLIC BLOOD PRESSURE: 70 MMHG | HEIGHT: 72 IN

## 2021-01-25 DIAGNOSIS — Z96.652 S/P TOTAL KNEE REPLACEMENT USING CEMENT, LEFT: ICD-10-CM

## 2021-01-25 DIAGNOSIS — Z96.652 S/P TOTAL KNEE REPLACEMENT USING CEMENT, LEFT: Primary | ICD-10-CM

## 2021-01-25 DIAGNOSIS — M79.89 CALF SWELLING: ICD-10-CM

## 2021-01-25 PROCEDURE — 73562 X-RAY EXAM OF KNEE 3: CPT | Mod: TC | Performed by: RADIOLOGY

## 2021-01-25 PROCEDURE — 99024 POSTOP FOLLOW-UP VISIT: CPT | Performed by: NURSE PRACTITIONER

## 2021-01-25 PROCEDURE — 93971 EXTREMITY STUDY: CPT | Mod: LT

## 2021-01-25 RX ORDER — OXYCODONE HYDROCHLORIDE 5 MG/1
5-10 TABLET ORAL EVERY 4 HOURS PRN
Qty: 35 TABLET | Refills: 0 | Status: SHIPPED | OUTPATIENT
Start: 2021-01-25 | End: 2021-02-22

## 2021-01-25 RX ORDER — HYDROXYZINE HYDROCHLORIDE 25 MG/1
25 TABLET, FILM COATED ORAL EVERY 6 HOURS PRN
Qty: 30 TABLET | Refills: 0 | Status: SHIPPED | OUTPATIENT
Start: 2021-01-25 | End: 2021-04-05

## 2021-01-25 RX ORDER — CYCLOBENZAPRINE HCL 10 MG
10 TABLET ORAL 3 TIMES DAILY PRN
Qty: 50 TABLET | Refills: 1 | Status: SHIPPED | OUTPATIENT
Start: 2021-01-25 | End: 2021-04-05

## 2021-01-25 ASSESSMENT — PAIN SCALES - GENERAL: PAINLEVEL: NO PAIN (0)

## 2021-01-25 ASSESSMENT — MIFFLIN-ST. JEOR: SCORE: 2157.81

## 2021-01-25 NOTE — PROGRESS NOTES
Orthopedic Clinic Post-Operative Note    CHIEF COMPLAINT:   Chief Complaint   Patient presents with     Surgical Followup     DOS: 1/12/2021~ Left total knee arthroplasty~13 days postop       HISTORY OF PRESENT ILLNESS  14 days post-op total knee replacement.  States overall the knee is doing well. Having some quad pain. Also noting some global pain and swelling as well. Has not been wearing fanta stockings. Also mentions he stopped the aspirin so he could take aleve a few days ago.  Denies specific focal area of pain or swelling to the calf, but does have global calf.  Denies numbness/tingling except lateral incision.  No reports of nausea/vomiting, SOB, c/p etc.  Is ambulatory. Working with home therapy. Transition to outpatient physical therapy next week.  Also notes he ran out of oxycodone a day or two ago and did not know he could get a refill so has been more painful last couple days.  Using atarax, oxycodone, tylenol for pain. Did not get discharged with a muscle relaxer.     Patient's past medical, surgical, social and family histories reviewed.     Past Medical History:   Diagnosis Date     Chondromalacia patellae of left knee 11/28/2014     CTS (carpal tunnel syndrome) 11/28/2014    right > left      GERD (gastroesophageal reflux disease)      Hiatal hernia 2008     Hypertension 3/26/2010     Mild airflow obstruction on pulmonary function test 4/6/2016     Morbid obesity with body mass index of 40.0-44.9 in adult (H) 3/16/2016     Tobacco use disorder 3/16/2016       Past Surgical History:   Procedure Laterality Date     APPENDECTOMY  04/04/10     ARTHROPLASTY KNEE Left 1/12/2021    Procedure: left total knee replacement;  Surgeon: Joel Quezada DO;  Location: [facility]     COLONOSCOPY N/A 10/16/2015    Procedure: COMBINED COLONOSCOPY, SINGLE OR MULTIPLE BIOPSY/POLYPECTOMY BY BIOPSY;  Surgeon: Marcial Colvin MD;  Location: [facility]     ESOPHAGOSCOPY, GASTROSCOPY, DUODENOSCOPY (EGD), COMBINED   12/27/2010    COMBINED ESOPHAGOSCOPY, GASTROSCOPY, DUODENOSCOPY (EGD), BIOPSY SINGLE OR MULTIPLE performed by JUSTINA CAR at [facility]     ESOPHAGOSCOPY, GASTROSCOPY, DUODENOSCOPY (EGD), COMBINED  12/26/2012    Procedure: COMBINED ESOPHAGOSCOPY, GASTROSCOPY, DUODENOSCOPY (EGD), BIOPSY SINGLE OR MULTIPLE;  ESOPHAGOSCOPY, GASTROSCOPY, DUODENOSCOPY WITH  SINGLE BIOPSY;  Surgeon: Justina Car MD;  Location:  GI     ESOPHAGOSCOPY, GASTROSCOPY, DUODENOSCOPY (EGD), COMBINED N/A 10/16/2015    Procedure: COMBINED ESOPHAGOSCOPY, GASTROSCOPY, DUODENOSCOPY (EGD), BIOPSY SINGLE OR MULTIPLE;  Surgeon: Marcial Colvin MD;  Location:  GI     ESOPHAGOSCOPY, GASTROSCOPY, DUODENOSCOPY (EGD), COMBINED N/A 9/1/2017    Procedure: COMBINED ESOPHAGOSCOPY, GASTROSCOPY, DUODENOSCOPY (EGD), BIOPSY SINGLE OR MULTIPLE;  Esophagogastroduodenoscopy, Biopsy by Biopsy;  Surgeon: Kevin Fung MD;  Location: [facility] UGI ENDOSCOPY DIAG W BIOPSY  8/28/2009     [facility] UGI ENDOSCOPY, SIMPLE EXAM  09/23/08       Medications:       acetaminophen (TYLENOL) 325 MG tablet, Take 3 tablets (975 mg) by mouth every 8 hours as needed for other (mild pain)       aspirin (ASA) 325 MG EC tablet, Take 1 tablet (325 mg) by mouth 2 times daily       omeprazole (PRILOSEC) 40 MG DR capsule, Take 1 capsule (40 mg) by mouth daily       senna-docusate (SENOKOT-S/PERICOLACE) 8.6-50 MG tablet, Take 1-2 tablets by mouth 2 times daily as needed for constipation (while taking narcotics) Take while on oral narcotics to prevent or treat constipation. (Patient not taking: Reported on 1/25/2021)    No current facility-administered medications on file prior to visit.       Allergies   Allergen Reactions     No Known Drug Allergies        Social History     Occupational History     Not on file   Tobacco Use     Smoking status: Current Every Day Smoker     Packs/day: 1.00     Smokeless tobacco: Never Used   Substance and Sexual Activity     Alcohol use:  "Yes     Comment: occasionally     Drug use: No     Sexual activity: Yes     Partners: Female       Family History   Problem Relation Age of Onset     Cancer Mother         Cervical     Cancer Maternal Grandfather      Cancer Paternal Grandmother        REVIEW OF SYSTEMS  General: negative for, night sweats, dizziness, fatigue  Resp: No shortness of breath and no cough  CV: negative for chest pain, syncope or near-syncope  GI: negative for nausea, vomiting and diarrhea  : negative for dysuria and hematuria  Musculoskeletal: as above  Neurologic: negative for syncope   Hematologic: negative for bleeding disorder    Physical Exam:  Vitals: /70   Temp 97.2  F (36.2  C) (Temporal)   Ht 1.832 m (6' 0.13\")   Wt 129.3 kg (285 lb)   BMI 38.51 kg/m    BMI= Body mass index is 38.51 kg/m .  Constitutional: healthy, alert and no acute distress   Psychiatric: mentation appears normal and affect normal/bright  NEURO: no focal deficits  SKIN: .healing well, well approximated skin edges, without signs of infection including no erythema, incision breakdown or purlent drainage  JOINT/EXTREMITIES:left knee: expected post op swelling to the knee. Non-tender.  Decreased sensation lateral of incision focal. Mild to moderate global calf swelling. No focal areas of tenderness, no focal areas of warmth, swelling.  AROM 15-85, PROM 5-90.  Extensor intact.  Distal neurovascular grossly intact. No instability with valgus or varus.   GAIT: not tested     Diagnostic Modalities:  left knee X-ray: The prosthesis has acceptable alignment. No fractures or dislocations. Prosthesis is well seated with no evidence of loosening  Independent visualization of the images was performed.      Impression:   Chief Complaint   Patient presents with     Surgical Followup     DOS: 1/12/2021~ Left total knee arthroplasty~13 days postop   As expected     Plan:   Discussed the calf pain. Although global pain is common after total knee replacement given " the non-adherence to aspirin and YAZ stockings along with some swelling and pain to the calf recommended an ultra-sound today to rule out DVT.  If negative, resume YAZ stocking, resume aspirin, continue therapy.   Activity: slowly increase activity as tolerated.    Aspirin for another 4 weeks.   YAZ stockings for another 4 weeks  Discussed work Not [employer] working. Works as a . No letter required.   Staples/Sutures out: Not applicable. Monocryl and dermal glue closure.   Ok to leave open to air. Do not soak or submerge for another 2 weeks.   Pain controlled: Yes. Continue to use: oxycodone, tylenol, aspirin, atarax, flexeril.  Rx sent for refill and also sent a new Rx for flexeril.   Immobilzation: No  Physical Therapy:  Transition outpatient physical therapy.   Rest, Ice, Elevation, Compression  Return to clinic 4 weeks, or sooner as needed for changes.    Re-x-ray on return: No    DARLEEN Esparza, CNP  Orthopedic Surgery    Addendum: 1100 am. Radiology called.  Negative for DVT.  Discharge to home with refills to pain medication and recommendations for icing, elevation, YAZ stockings, aspirin.     DARLEEN Esparza, CNP  Orthopedic Surgery

## 2021-01-25 NOTE — LETTER
1/25/2021         RE: Amol Peres  [address]  [place] [address]        Dear Colleague,    Thank you for referring your patient, Amol Peres, to the [facility]. Please see a copy of my visit note below.    Orthopedic Clinic Post-Operative Note    CHIEF COMPLAINT:   Chief Complaint   Patient presents with     Surgical Followup     DOS: 1/12/2021~ Left total knee arthroplasty~13 days postop       HISTORY OF PRESENT ILLNESS  14 days post-op total knee replacement.  States overall the knee is doing well. Having some quad pain. Also noting some global pain and swelling as well. Has not been wearing fanta stockings. Also mentions he stopped the aspirin so he could take aleve a few days ago.  Denies specific focal area of pain or swelling to the calf, but does have global calf.  Denies numbness/tingling except lateral incision.  No reports of nausea/vomiting, SOB, c/p etc.  Is ambulatory. Working with home therapy. Transition to outpatient physical therapy next week.  Also notes he ran out of oxycodone a day or two ago and did not know he could get a refill so has been more painful last couple days.  Using atarax, oxycodone, tylenol for pain. Did not get discharged with a muscle relaxer.     Patient's past medical, surgical, social and family histories reviewed.     Past Medical History:   Diagnosis Date     Chondromalacia patellae of left knee 11/28/2014     CTS (carpal tunnel syndrome) 11/28/2014    right > left      GERD (gastroesophageal reflux disease)      Hiatal hernia 2008     Hypertension 3/26/2010     Mild airflow obstruction on pulmonary function test 4/6/2016     Morbid obesity with body mass index of 40.0-44.9 in adult (H) 3/16/2016     Tobacco use disorder 3/16/2016       Past Surgical History:   Procedure Laterality Date     APPENDECTOMY  04/04/10     ARTHROPLASTY KNEE Left 1/12/2021    Procedure: left total knee replacement;  Surgeon: Joel Quezada,  DO;  Location: [facility]     COLONOSCOPY N/A 10/16/2015    Procedure: COMBINED COLONOSCOPY, SINGLE OR MULTIPLE BIOPSY/POLYPECTOMY BY BIOPSY;  Surgeon: Marcial Colvin MD;  Location:  GI     ESOPHAGOSCOPY, GASTROSCOPY, DUODENOSCOPY (EGD), COMBINED  12/27/2010    COMBINED ESOPHAGOSCOPY, GASTROSCOPY, DUODENOSCOPY (EGD), BIOPSY SINGLE OR MULTIPLE performed by JUSTINA CAR at [facility]     ESOPHAGOSCOPY, GASTROSCOPY, DUODENOSCOPY (EGD), COMBINED  12/26/2012    Procedure: COMBINED ESOPHAGOSCOPY, GASTROSCOPY, DUODENOSCOPY (EGD), BIOPSY SINGLE OR MULTIPLE;  ESOPHAGOSCOPY, GASTROSCOPY, DUODENOSCOPY WITH  SINGLE BIOPSY;  Surgeon: Justina Car MD;  Location:  GI     ESOPHAGOSCOPY, GASTROSCOPY, DUODENOSCOPY (EGD), COMBINED N/A 10/16/2015    Procedure: COMBINED ESOPHAGOSCOPY, GASTROSCOPY, DUODENOSCOPY (EGD), BIOPSY SINGLE OR MULTIPLE;  Surgeon: Marcial Colvin MD;  Location:  GI     ESOPHAGOSCOPY, GASTROSCOPY, DUODENOSCOPY (EGD), COMBINED N/A 9/1/2017    Procedure: COMBINED ESOPHAGOSCOPY, GASTROSCOPY, DUODENOSCOPY (EGD), BIOPSY SINGLE OR MULTIPLE;  Esophagogastroduodenoscopy, Biopsy by Biopsy;  Surgeon: Kevin Fung MD;  Location: [facility] UGI ENDOSCOPY DIAG W BIOPSY  8/28/2009     [facility] UGI ENDOSCOPY, SIMPLE EXAM  09/23/08       Medications:       acetaminophen (TYLENOL) 325 MG tablet, Take 3 tablets (975 mg) by mouth every 8 hours as needed for other (mild pain)       aspirin (ASA) 325 MG EC tablet, Take 1 tablet (325 mg) by mouth 2 times daily       omeprazole (PRILOSEC) 40 MG DR capsule, Take 1 capsule (40 mg) by mouth daily       senna-docusate (SENOKOT-S/PERICOLACE) 8.6-50 MG tablet, Take 1-2 tablets by mouth 2 times daily as needed for constipation (while taking narcotics) Take while on oral narcotics to prevent or treat constipation. (Patient not taking: Reported on 1/25/2021)    No current facility-administered medications on file prior to visit.       Allergies   Allergen Reactions      "No Known Drug Allergies        Social History     Occupational History     Not on file   Tobacco Use     Smoking status: Current Every Day Smoker     Packs/day: 1.00     Smokeless tobacco: Never Used   Substance and Sexual Activity     Alcohol use: Yes     Comment: occasionally     Drug use: No     Sexual activity: Yes     Partners: Female       Family History   Problem Relation Age of Onset     Cancer Mother         Cervical     Cancer Maternal Grandfather      Cancer Paternal Grandmother        REVIEW OF SYSTEMS  General: negative for, night sweats, dizziness, fatigue  Resp: No shortness of breath and no cough  CV: negative for chest pain, syncope or near-syncope  GI: negative for nausea, vomiting and diarrhea  : negative for dysuria and hematuria  Musculoskeletal: as above  Neurologic: negative for syncope   Hematologic: negative for bleeding disorder    Physical Exam:  Vitals: /70   Temp 97.2  F (36.2  C) (Temporal)   Ht 1.832 m (6' 0.13\")   Wt 129.3 kg (285 lb)   BMI 38.51 kg/m    BMI= Body mass index is 38.51 kg/m .  Constitutional: healthy, alert and no acute distress   Psychiatric: mentation appears normal and affect normal/bright  NEURO: no focal deficits  SKIN: .healing well, well approximated skin edges, without signs of infection including no erythema, incision breakdown or purlent drainage  JOINT/EXTREMITIES:left knee: expected post op swelling to the knee. Non-tender.  Decreased sensation lateral of incision focal. Mild to moderate global calf swelling. No focal areas of tenderness, no focal areas of warmth, swelling.  AROM 15-85, PROM 5-90.  Extensor intact.  Distal neurovascular grossly intact. No instability with valgus or varus.   GAIT: not tested     Diagnostic Modalities:  left knee X-ray: The prosthesis has acceptable alignment. No fractures or dislocations. Prosthesis is well seated with no evidence of loosening  Independent visualization of the images was " performed.      Impression:   Chief Complaint   Patient presents with     Surgical Followup     DOS: 1/12/2021~ Left total knee arthroplasty~13 days postop   As expected     Plan:   Discussed the calf pain. Although global pain is common after total knee replacement given the non-adherence to aspirin and YAZ stockings along with some swelling and pain to the calf recommended an ultra-sound today to rule out DVT.  If negative, resume YAZ stocking, resume aspirin, continue therapy.   Activity: slowly increase activity as tolerated.    Aspirin for another 4 weeks.   YAZ stockings for another 4 weeks  Discussed work Not [employer] working. Works as a . No letter required.   Staples/Sutures out: Not applicable. Monocryl and dermal glue closure.   Ok to leave open to air. Do not soak or submerge for another 2 weeks.   Pain controlled: Yes. Continue to use: oxycodone, tylenol, aspirin, atarax, flexeril.  Rx sent for refill and also sent a new Rx for flexeril.   Immobilzation: No  Physical Therapy:  Transition outpatient physical therapy.   Rest, Ice, Elevation, Compression  Return to clinic 4 weeks, or sooner as needed for changes.    Re-x-ray on return: No    DARLEEN Esparza, CNP  Orthopedic Surgery    Addendum: 1100 am. Radiology called.  Negative for DVT.  Discharge to home with refills to pain medication and recommendations for icing, elevation, YAZ stockings, aspirin.     DARLEEN Esparza, CNP  Orthopedic Surgery            Again, thank you for allowing me to participate in the care of your patient.        Sincerely,        DARLEEN Gil CNP

## 2021-01-27 ENCOUNTER — TELEPHONE (OUTPATIENT)
Dept: ORTHOPEDICS | Facility: OTHER | Age: 56
End: 2021-01-27

## 2021-01-27 NOTE — TELEPHONE ENCOUNTER
Form completed, copy for scanning and also faxed to [phone]. origional in MA desk  [employer]

## 2021-01-27 NOTE — TELEPHONE ENCOUNTER
Forms were completed to the best of my ability and placed in the provider's basket to review and sign if appropriate.     Fax to [phone] when complete.     Asia ISABEL, Lead RN, BSN. . .  1/27/2021, 2:36 PM

## 2021-01-27 NOTE — TELEPHONE ENCOUNTER
Reason for Call:  Other form    Detailed comments: [employer] form received via fax and placed in providers box.  [phone]  Reference claim #[id number]     Phone Number Patient can be reached at: Other phone number:  [phone]*    Best Time: any    Can we leave a detailed message on this number? Not Applicable    Call taken on 1/27/2021 at 1:28 PM by Tierra Gruber

## 2021-02-01 ENCOUNTER — HOSPITAL ENCOUNTER (OUTPATIENT)
Dept: PHYSICAL THERAPY | Facility: CLINIC | Age: 56
Setting detail: THERAPIES SERIES
End: 2021-02-01
Attending: NURSE PRACTITIONER
Payer: COMMERCIAL

## 2021-02-01 DIAGNOSIS — Z96.651 S/P TOTAL KNEE REPLACEMENT USING CEMENT, RIGHT: ICD-10-CM

## 2021-02-01 PROCEDURE — 97110 THERAPEUTIC EXERCISES: CPT | Mod: GP | Performed by: PHYSICAL THERAPIST

## 2021-02-01 PROCEDURE — 97161 PT EVAL LOW COMPLEX 20 MIN: CPT | Mod: GP | Performed by: PHYSICAL THERAPIST

## 2021-02-02 NOTE — PROGRESS NOTES
02/01/21 0700   General Information   Type of Visit Initial OP Ortho PT Evaluation   Start of Care Date 02/01/21   Referring Physician Jessee Bob NP   Patient/Family Goals Statement be ready to golf as soon as can, ride ATV, get rid of cane, sleep better   Orders Evaluate and Treat   Orders Comment refer to pt activity order, s/p knee surgery   Date of Order 01/12/21   Certification Required? No   Medical Diagnosis S/P total knee replacement using cement, right Z96.651  - Primary    Surgical/Medical history reviewed Yes  (obesity, HTN, smoker, see EPIC)   Precautions/Limitations no known precautions/limitations   Body Part(s)   Body Part(s) Knee   Presentation and Etiology   Pertinent history of current problem (include personal factors and/or comorbidities that impact the POC) Underwent L TKA on 1/12/21. Just finished homecare last Thursday.  Recalls having 93 degrees of bending. Hx of L knee pain 2-3+ years prior to the TKA, steroids helped for a long time but stopped helping so opted for TKA.  Reports a hx of R knee issues as well - may need a TKA on that side, too.  Can be really painful during the noc.  More of the pain is in the leg muscles themselves vs the knee itself.  Got rid of walker after the first week.  Reports his house is wide open as they are in the middle of redoing it.  Taking 1 oxy about 30 min before bed and then takes a Tylenol in the middle of the noc.  Borrowed a cryocuff over the weekend that helps.  Tried an ACE bandage at noc to protect incision from rubbing but didn't like that.   Impairments A. Pain;B. Decreased WB tolerance;C. Swelling;D. Decreased ROM;E. Decreased flexibility;F. Decreased strength and endurance;H. Impaired gait;I. Impaired skin integrity   Functional Limitations perform activities of daily living;perform required work activities;perform desired leisure / sports activities   Symptom Location post calf, ant/lat thigh on the L   How/Where did it occur From  Degenerative Joint Disease   Onset date of current episode/exacerbation 01/12/21   Chronicity New   Pain rating (0-10 point scale) Other   Pain rating comment best: 0-1/10, now: 5-6/10, worst: 8-10/10   Pain quality H. Other   Pain quality comment soreness, stiffness   Frequency of pain/symptoms A. Constant   Pain/symptoms are: Worse during the night   Pain/symptoms exacerbated by M. Other   Pain exacerbation comment noc-time, rolling in bed, unless he tweaks it wrong, car transfers   Pain/symptoms eased by K. Other   Pain eased by comment ice packs, pain meds, Tylenol   Progression of symptoms since onset: Improved   Prior Level of Function   Functional Level Prior Comment completed 2 weeks home PT   Current Level of Function   Current Community Support Family/friend caregiver   Patient role/employment history A. Employed   Employment Comments works as a  and a    Living environment [facility]   Home/community accessibility mobile home, 3 steps to enter, has started driving again   Fall Risk Screen   Fall screen completed by PT   Have you fallen 2 or more times in the past year? No   Have you fallen and had an injury in the past year? No   Is patient a fall risk? No   Abuse Screen (yes response referral indicated)   Feels Unsafe at Home or Work/School no   Feels Threatened by Someone no   Does Anyone Try to Keep You From Having Contact with Others or Doing Things Outside Your Home? no   Physical Signs of Abuse Present no   Functional Scales   Functional Scales Other   Other Scales  LEFS: 36/80   Knee Objective Findings   Side (if bilateral, select both right and left) Left   Observation no acute distress   Integumentary  incision healing well, still some scabbed areas distal half of incision, lat L ankle edema, mid-patellar circumference in cm B: R 45.1/ L 46.6, bruising remains post knee, medial thigh   Gait/Locomotion ambulates with antalgic pattern with swing thru pattern with SEC on R  side, decreased L knee push-off, decreased stride length   Knee ROM Comment AROM B knees in supine in degrees: flex R 133/ L 80, ext R 19/ L 9   Knee/Hip Strength Comments 25 deg L extensor lag   Palpation L ITB is very tight to palpation   Left Quad Set Strength fair quad contraction   Planned Therapy Interventions   Planned Therapy Interventions balance training;gait training;joint mobilization;manual therapy;neuromuscular re-education;ROM;strengthening;stretching   Planned Therapy Interventions Comment possible taping techniques   Planned Modality Interventions   Planned Modality Interventions Comments modalities as needed   Clinical Impression   Criteria for Skilled Therapeutic Interventions Met yes, treatment indicated   PT Diagnosis L knee pain and decreased function s/p L TKA   Influenced by the following impairments pain, edema, weakness, impaired skin integrity, decreased ROM   Functional limitations due to impairments walking, sleeping, sporting activities, work duties   Clinical Presentation Stable/Uncomplicated   Clinical Presentation Rationale History: post surgical, smoker; Examination: LEFS score, activity/participation restrictions   Clinical Decision Making (Complexity) Low complexity   Therapy Frequency 2 times/Week   Predicted Duration of Therapy Intervention (days/wks) 90 days as needed   Risk & Benefits of therapy have been explained Yes   Patient, Family & other staff in agreement with plan of care Yes   Clinical Impression Comments Pt is 2 weeks s/p L TKA with expected ongoing pain, edema, and ROM restrictions.  He would benefit from skilled PT services to address these limitations to promote overall improved functional mobility and QOL.   Education Assessment   Preferred Learning Style Listening;Reading;Demonstration;Pictures/video   Barriers to Learning No barriers   ORTHO GOALS   PT Ortho Eval Goals 1;2;3;4   Ortho Goal 1   Goal Identifier 1 Ambulation   Goal Description Pt will safely  ambulate 500 ft or greater for community ambulation without the use of a SEC.   Target Date 03/02/21   Ortho Goal 2   Goal Identifier 2 Sleep   Goal Description Pt will report he can sleep for 5-6 hrs or more per noc due to reduced L knee pain.   Target Date 03/02/21   Ortho Goal 3   Goal Identifier 3 LEFS   Goal Description Pt will increase his score on the LEFS (LE Functional Scale) to 60/80 or greater indicating a clinically meaningful improvement in LE pain and function over time.   Target Date 05/01/21   Ortho Goal 4   Goal Identifier 4 Sports   Goal Description Pt will be able to return to playing golf due to improve L knee mobility, pain and strength.   Target Date 05/01/21   Total Evaluation Time   PT Eval, Low Complexity Minutes (12731) 30

## 2021-02-03 ENCOUNTER — HOSPITAL ENCOUNTER (OUTPATIENT)
Dept: PHYSICAL THERAPY | Facility: CLINIC | Age: 56
Setting detail: THERAPIES SERIES
End: 2021-02-03
Attending: NURSE PRACTITIONER
Payer: COMMERCIAL

## 2021-02-03 PROCEDURE — 97110 THERAPEUTIC EXERCISES: CPT | Mod: GP | Performed by: PHYSICAL THERAPIST

## 2021-02-03 PROCEDURE — 97140 MANUAL THERAPY 1/> REGIONS: CPT | Mod: GP | Performed by: PHYSICAL THERAPIST

## 2021-02-08 ENCOUNTER — HOSPITAL ENCOUNTER (OUTPATIENT)
Dept: PHYSICAL THERAPY | Facility: CLINIC | Age: 56
Setting detail: THERAPIES SERIES
End: 2021-02-08
Attending: NURSE PRACTITIONER
Payer: COMMERCIAL

## 2021-02-08 PROCEDURE — 97110 THERAPEUTIC EXERCISES: CPT | Mod: GP | Performed by: PHYSICAL THERAPIST

## 2021-02-08 PROCEDURE — 97140 MANUAL THERAPY 1/> REGIONS: CPT | Mod: GP | Performed by: PHYSICAL THERAPIST

## 2021-02-15 ENCOUNTER — HOSPITAL ENCOUNTER (OUTPATIENT)
Dept: PHYSICAL THERAPY | Facility: CLINIC | Age: 56
Setting detail: THERAPIES SERIES
End: 2021-02-15
Attending: NURSE PRACTITIONER
Payer: COMMERCIAL

## 2021-02-15 PROCEDURE — 97140 MANUAL THERAPY 1/> REGIONS: CPT | Mod: GP | Performed by: PHYSICAL THERAPIST

## 2021-02-15 PROCEDURE — 97110 THERAPEUTIC EXERCISES: CPT | Mod: GP | Performed by: PHYSICAL THERAPIST

## 2021-02-18 ENCOUNTER — HOSPITAL ENCOUNTER (OUTPATIENT)
Dept: PHYSICAL THERAPY | Facility: CLINIC | Age: 56
Setting detail: THERAPIES SERIES
End: 2021-02-18
Attending: NURSE PRACTITIONER
Payer: COMMERCIAL

## 2021-02-18 PROCEDURE — 97110 THERAPEUTIC EXERCISES: CPT | Mod: GP

## 2021-02-18 PROCEDURE — 97140 MANUAL THERAPY 1/> REGIONS: CPT | Mod: GP

## 2021-02-22 ENCOUNTER — HOSPITAL ENCOUNTER (OUTPATIENT)
Dept: PHYSICAL THERAPY | Facility: CLINIC | Age: 56
Setting detail: THERAPIES SERIES
End: 2021-02-22
Attending: NURSE PRACTITIONER
Payer: COMMERCIAL

## 2021-02-22 ENCOUNTER — OFFICE VISIT (OUTPATIENT)
Dept: ORTHOPEDICS | Facility: CLINIC | Age: 56
End: 2021-02-22
Payer: COMMERCIAL

## 2021-02-22 VITALS
SYSTOLIC BLOOD PRESSURE: 138 MMHG | HEIGHT: 72 IN | BODY MASS INDEX: 37.65 KG/M2 | WEIGHT: 278 LBS | DIASTOLIC BLOOD PRESSURE: 80 MMHG

## 2021-02-22 DIAGNOSIS — Z96.652 S/P TOTAL KNEE REPLACEMENT USING CEMENT, LEFT: Primary | ICD-10-CM

## 2021-02-22 PROCEDURE — 99024 POSTOP FOLLOW-UP VISIT: CPT | Performed by: NURSE PRACTITIONER

## 2021-02-22 PROCEDURE — 97110 THERAPEUTIC EXERCISES: CPT | Mod: GP | Performed by: PHYSICAL THERAPIST

## 2021-02-22 ASSESSMENT — MIFFLIN-ST. JEOR: SCORE: 2126.06

## 2021-02-22 ASSESSMENT — PAIN SCALES - GENERAL: PAINLEVEL: NO PAIN (1)

## 2021-02-22 NOTE — LETTER
2/22/2021         RE: Amol Peres  [address]  [place] [address]        Dear Colleague,    Thank you for referring your patient, Amol Peres, to the [facility]. Please see a copy of my visit note below.    Orthopedic Clinic Post-Operative Note    CHIEF COMPLAINT:   Chief Complaint   Patient presents with     RECHECK     left knee follow up     Surgical Followup     DOS: 1/12/2021~ Left total knee arthroplasty~5 weeks postop       HISTORY OF PRESENT ILLNESS  5 weeks post-op.  overall continues to improve. Since the last visit pain, swelling, motion, and ability to ambulate have all improved. Still struggles at night on some days. Reports muscle spasm and some pain along the left knee and lower extremity.   No shooting pains. No numbness/tingling. Working with physical therapy. No new injuries. Some residual numbness to lateral knee. Does not radiate.  Takes occasional flexeril and tylenol. Has had some days/night when does not require any medication.  Taking aspirin.     Patient's past medical, surgical, social and family histories reviewed.     Past Medical History:   Diagnosis Date     Chondromalacia patellae of left knee 11/28/2014     CTS (carpal tunnel syndrome) 11/28/2014    right > left      GERD (gastroesophageal reflux disease)      Hiatal hernia 2008     Hypertension 3/26/2010     Mild airflow obstruction on pulmonary function test 4/6/2016     Morbid obesity with body mass index of 40.0-44.9 in adult (H) 3/16/2016     Tobacco use disorder 3/16/2016       Past Surgical History:   Procedure Laterality Date     APPENDECTOMY  04/04/10     ARTHROPLASTY KNEE Left 1/12/2021    Procedure: left total knee replacement;  Surgeon: Joel Quezada DO;  Location: [facility]     COLONOSCOPY N/A 10/16/2015    Procedure: COMBINED COLONOSCOPY, SINGLE OR MULTIPLE BIOPSY/POLYPECTOMY BY BIOPSY;  Surgeon: Marcial Colvin MD;  Location:  GI     ESOPHAGOSCOPY,  GASTROSCOPY, DUODENOSCOPY (EGD), COMBINED  12/27/2010    COMBINED ESOPHAGOSCOPY, GASTROSCOPY, DUODENOSCOPY (EGD), BIOPSY SINGLE OR MULTIPLE performed by JUSTINA CAR at  GI     ESOPHAGOSCOPY, GASTROSCOPY, DUODENOSCOPY (EGD), COMBINED  12/26/2012    Procedure: COMBINED ESOPHAGOSCOPY, GASTROSCOPY, DUODENOSCOPY (EGD), BIOPSY SINGLE OR MULTIPLE;  ESOPHAGOSCOPY, GASTROSCOPY, DUODENOSCOPY WITH  SINGLE BIOPSY;  Surgeon: Justina Car MD;  Location:  GI     ESOPHAGOSCOPY, GASTROSCOPY, DUODENOSCOPY (EGD), COMBINED N/A 10/16/2015    Procedure: COMBINED ESOPHAGOSCOPY, GASTROSCOPY, DUODENOSCOPY (EGD), BIOPSY SINGLE OR MULTIPLE;  Surgeon: Marcial Colvin MD;  Location:  GI     ESOPHAGOSCOPY, GASTROSCOPY, DUODENOSCOPY (EGD), COMBINED N/A 9/1/2017    Procedure: COMBINED ESOPHAGOSCOPY, GASTROSCOPY, DUODENOSCOPY (EGD), BIOPSY SINGLE OR MULTIPLE;  Esophagogastroduodenoscopy, Biopsy by Biopsy;  Surgeon: Kevin Fung MD;  Location: [facility] UGI ENDOSCOPY DIAG W BIOPSY  8/28/2009     [facility] UGI ENDOSCOPY, SIMPLE EXAM  09/23/08       Medications:  acetaminophen (TYLENOL) 325 MG tablet, Take 3 tablets (975 mg) by mouth every 8 hours as needed for other (mild pain)  aspirin (ASA) 325 MG EC tablet, Take 1 tablet (325 mg) by mouth 2 times daily  cyclobenzaprine (FLEXERIL) 10 MG tablet, Take 1 tablet (10 mg) by mouth 3 times daily as needed for muscle spasms (pain)  hydrOXYzine (ATARAX) 25 MG tablet, Take 1 tablet (25 mg) by mouth every 6 hours as needed for itching or anxiety (with pain, moderate pain)  omeprazole (PRILOSEC) 40 MG DR capsule, Take 1 capsule (40 mg) by mouth daily (Patient not taking: Reported on 2/22/2021)    No current facility-administered medications on file prior to visit.       Allergies   Allergen Reactions     No Known Drug Allergies        Social History     Occupational History     Not on file   Tobacco Use     Smoking status: Current Every Day Smoker     Packs/day: 1.00      "Smokeless tobacco: Never Used   Substance and Sexual Activity     Alcohol use: Yes     Comment: occasionally     Drug use: No     Sexual activity: Yes     Partners: Female       Family History   Problem Relation Age of Onset     Cancer Mother         Cervical     Cancer Maternal Grandfather      Cancer Paternal Grandmother        REVIEW OF SYSTEMS  General: negative for, night sweats, dizziness, fatigue  Resp: No shortness of breath and no cough  CV: negative for chest pain, syncope or near-syncope  GI: negative for nausea, vomiting and diarrhea  : negative for dysuria and hematuria  Musculoskeletal: as above  Neurologic: negative for syncope   Hematologic: negative for bleeding disorder    Physical Exam:  Vitals: /80   Ht 1.832 m (6' 0.13\")   Wt 126.1 kg (278 lb)   BMI 37.57 kg/m    BMI= Body mass index is 37.57 kg/m .  Constitutional: healthy, alert and no acute distress   Psychiatric: mentation appears normal and affect normal/bright  NEURO: no focal deficits  SKIN: .well healed, no erythema, no incision breakdown and no drainage.  JOINT/EXTREMITIES: left knee: AROM: approximately 3-5 degrees short of full extension, flexion: 105. PROM 0-105. No instability with valgus or varus. Straight leg raise intact. Expected post-op swelling to the knee, improved from last visit. No swelling to distal of knee. Calf soft non-tender. Distal neurovascular grossly intact.   GAIT: not tested     Diagnostic Modalities:  None today.  Independent visualization of the images was performed.      Impression:   Chief Complaint   Patient presents with     RECHECK     left knee follow up     Surgical Followup     DOS: 1/12/2021~ Left total knee arthroplasty~5 weeks postop   As expected     Plan:   Activity: continue with therapy.  Swelling, stiffness, pain still normal at 5 week dena, may have some that continues up to 3-4 months. Anticipate continue to improve.  Continue to increase activity as tolerated.      Continue with " rest, icing, elevation. Continue aspirin until Rx ends, can then return to any previous aspirin or NSAIDs.      Return to clinic 6 weeks, or sooner as needed for changes.    Re-x-ray on return: Yes.    DARLEEN Esparza, CNP  Orthopedic Surgery            Again, thank you for allowing me to participate in the care of your patient.        Sincerely,        Joel Quezada, DO

## 2021-02-22 NOTE — PROGRESS NOTES
Orthopedic Clinic Post-Operative Note    CHIEF COMPLAINT:   Chief Complaint   Patient presents with     RECHECK     left knee follow up     Surgical Followup     DOS: 1/12/2021~ Left total knee arthroplasty~5 weeks postop       HISTORY OF PRESENT ILLNESS  5 weeks post-op.  overall continues to improve. Since the last visit pain, swelling, motion, and ability to ambulate have all improved. Still struggles at night on some days. Reports muscle spasm and some pain along the left knee and lower extremity.   No shooting pains. No numbness/tingling. Working with physical therapy. No new injuries. Some residual numbness to lateral knee. Does not radiate.  Takes occasional flexeril and tylenol. Has had some days/night when does not require any medication.  Taking aspirin.     Patient's past medical, surgical, social and family histories reviewed.     Past Medical History:   Diagnosis Date     Chondromalacia patellae of left knee 11/28/2014     CTS (carpal tunnel syndrome) 11/28/2014    right > left      GERD (gastroesophageal reflux disease)      Hiatal hernia 2008     Hypertension 3/26/2010     Mild airflow obstruction on pulmonary function test 4/6/2016     Morbid obesity with body mass index of 40.0-44.9 in adult (H) 3/16/2016     Tobacco use disorder 3/16/2016       Past Surgical History:   Procedure Laterality Date     APPENDECTOMY  04/04/10     ARTHROPLASTY KNEE Left 1/12/2021    Procedure: left total knee replacement;  Surgeon: Joel Quezada DO;  Location:  OR     COLONOSCOPY N/A 10/16/2015    Procedure: COMBINED COLONOSCOPY, SINGLE OR MULTIPLE BIOPSY/POLYPECTOMY BY BIOPSY;  Surgeon: Marcial Colvin MD;  Location:  GI     ESOPHAGOSCOPY, GASTROSCOPY, DUODENOSCOPY (EGD), COMBINED  12/27/2010    COMBINED ESOPHAGOSCOPY, GASTROSCOPY, DUODENOSCOPY (EGD), BIOPSY SINGLE OR MULTIPLE performed by JUSTINA MORGAN at  GI     ESOPHAGOSCOPY, GASTROSCOPY, DUODENOSCOPY (EGD), COMBINED  12/26/2012     Procedure: COMBINED ESOPHAGOSCOPY, GASTROSCOPY, DUODENOSCOPY (EGD), BIOPSY SINGLE OR MULTIPLE;  ESOPHAGOSCOPY, GASTROSCOPY, DUODENOSCOPY WITH  SINGLE BIOPSY;  Surgeon: Patrice Car MD;  Location: [facility]     ESOPHAGOSCOPY, GASTROSCOPY, DUODENOSCOPY (EGD), COMBINED N/A 10/16/2015    Procedure: COMBINED ESOPHAGOSCOPY, GASTROSCOPY, DUODENOSCOPY (EGD), BIOPSY SINGLE OR MULTIPLE;  Surgeon: Marcial Colvin MD;  Location: [facility]     ESOPHAGOSCOPY, GASTROSCOPY, DUODENOSCOPY (EGD), COMBINED N/A 9/1/2017    Procedure: COMBINED ESOPHAGOSCOPY, GASTROSCOPY, DUODENOSCOPY (EGD), BIOPSY SINGLE OR MULTIPLE;  Esophagogastroduodenoscopy, Biopsy by Biopsy;  Surgeon: Kevin Fung MD;  Location: [facility] GI      UGI ENDOSCOPY DIAG W BIOPSY  8/28/2009     [facility] UGI ENDOSCOPY, SIMPLE EXAM  09/23/08       Medications:  acetaminophen (TYLENOL) 325 MG tablet, Take 3 tablets (975 mg) by mouth every 8 hours as needed for other (mild pain)  aspirin (ASA) 325 MG EC tablet, Take 1 tablet (325 mg) by mouth 2 times daily  cyclobenzaprine (FLEXERIL) 10 MG tablet, Take 1 tablet (10 mg) by mouth 3 times daily as needed for muscle spasms (pain)  hydrOXYzine (ATARAX) 25 MG tablet, Take 1 tablet (25 mg) by mouth every 6 hours as needed for itching or anxiety (with pain, moderate pain)  omeprazole (PRILOSEC) 40 MG DR capsule, Take 1 capsule (40 mg) by mouth daily (Patient not taking: Reported on 2/22/2021)    No current facility-administered medications on file prior to visit.       Allergies   Allergen Reactions     No Known Drug Allergies        Social History     Occupational History     Not on file   Tobacco Use     Smoking status: Current Every Day Smoker     Packs/day: 1.00     Smokeless tobacco: Never Used   Substance and Sexual Activity     Alcohol use: Yes     Comment: occasionally     Drug use: No     Sexual activity: Yes     Partners: Female       Family History   Problem Relation Age of Onset     Cancer Mother         Cervical      "Cancer Maternal Grandfather      Cancer Paternal Grandmother        REVIEW OF SYSTEMS  General: negative for, night sweats, dizziness, fatigue  Resp: No shortness of breath and no cough  CV: negative for chest pain, syncope or near-syncope  GI: negative for nausea, vomiting and diarrhea  : negative for dysuria and hematuria  Musculoskeletal: as above  Neurologic: negative for syncope   Hematologic: negative for bleeding disorder    Physical Exam:  Vitals: /80   Ht 1.832 m (6' 0.13\")   Wt 126.1 kg (278 lb)   BMI 37.57 kg/m    BMI= Body mass index is 37.57 kg/m .  Constitutional: healthy, alert and no acute distress   Psychiatric: mentation appears normal and affect normal/bright  NEURO: no focal deficits  SKIN: .well healed, no erythema, no incision breakdown and no drainage.  JOINT/EXTREMITIES: left knee: AROM: approximately 3-5 degrees short of full extension, flexion: 105. PROM 0-105. No instability with valgus or varus. Straight leg raise intact. Expected post-op swelling to the knee, improved from last visit. No swelling to distal of knee. Calf soft non-tender. Distal neurovascular grossly intact.   GAIT: not tested     Diagnostic Modalities:  None today.  Independent visualization of the images was performed.      Impression:   Chief Complaint   Patient presents with     RECHECK     left knee follow up     Surgical Followup     DOS: 1/12/2021~ Left total knee arthroplasty~5 weeks postop   As expected     Plan:   Activity: continue with therapy.  Swelling, stiffness, pain still normal at 5 week dena, may have some that continues up to 3-4 months. Anticipate continue to improve.  Continue to increase activity as tolerated.      Continue with rest, icing, elevation. Continue aspirin until Rx ends, can then return to any previous aspirin or NSAIDs.      Return to clinic 6 weeks, or sooner as needed for changes.    Re-x-ray on return: Yes.    Jessee Bob APRN, CNP  Orthopedic Surgery        "

## 2021-02-25 ENCOUNTER — HOSPITAL ENCOUNTER (OUTPATIENT)
Dept: PHYSICAL THERAPY | Facility: CLINIC | Age: 56
Setting detail: THERAPIES SERIES
End: 2021-02-25
Attending: NURSE PRACTITIONER
Payer: COMMERCIAL

## 2021-02-25 PROCEDURE — 97110 THERAPEUTIC EXERCISES: CPT | Mod: GP | Performed by: PHYSICAL THERAPIST

## 2021-02-25 PROCEDURE — 97140 MANUAL THERAPY 1/> REGIONS: CPT | Mod: GP | Performed by: PHYSICAL THERAPIST

## 2021-03-01 ENCOUNTER — HOSPITAL ENCOUNTER (OUTPATIENT)
Dept: PHYSICAL THERAPY | Facility: CLINIC | Age: 56
Setting detail: THERAPIES SERIES
End: 2021-03-01
Attending: NURSE PRACTITIONER
Payer: COMMERCIAL

## 2021-03-01 PROCEDURE — 97110 THERAPEUTIC EXERCISES: CPT | Mod: GP | Performed by: PHYSICAL THERAPIST

## 2021-03-01 NOTE — PROGRESS NOTES
Outpatient Physical Therapy Progress Note     Patient: Amol Peres    : 1965    Beginning/End Dates of Reporting Period: 21 to 3/1/21    Referring Provider: Jessee Bob NP    Therapy Diagnosis: L knee pain and decreased function s/p L TKA     Client Self Report: Sleeping about 4 hours now so that is somewhat better.  Still getting a sharp pain when he goes to stand up, feels it when moving from bent to fully straightened position.  Can only do 1 step at a time on steps, tried last week to go step over step but was too painful and didn't trust it.    Objective Measurements:  Objective Measure: pain  Details: 4/10    Objective Measure: AROM L knee in supine in degrees  Details: ext: 8, flex: 122 (initially was 19-80)    Objective Measure: LEFS  Details: 3780 (improved from 36/80)    Objective Measure: observation/edema  Details: L ankle/lower leg edema resolved, patellar circumference: L 45.1 cm/ R 46.6 cm    Objective Measure: gait/stairs  Details: no use of AD, decreased knee extension, slight antalgic pattern; step-to pattern on stairs for both ascending/descending with one railing     Goals:  Goal Identifier 1 Ambulation   Goal Description Pt will safely ambulate 500 ft or greater for community ambulation without the use of a SEC.   Target Date 21   Date Met  21   Progress: Walking without cane at least 500 ft now so goal is met.     Goal Identifier 2 Sleep   Goal Description Pt will report he can sleep for 5-6 hrs or more per noc due to reduced L knee pain.  Pt reports he has been sleeping about 4 hours now so he is progressing to this goal.   Target Date 21   Date Met      Progress:     Goal Identifier 3 LEFS   Goal Description Pt will increase his score on the LEFS (LE Functional Scale) to 60/80 or greater indicating a clinically meaningful improvement in LE pain and function over time.    Target Date 21   Date Met      Progress: Pt's score improved to 37/80  today so goal remains in progress.     Goal Identifier 4 Sports   Goal Description Pt will be able to return to playing golf due to improve L knee mobility, pain and strength.   Target Date 05/01/21   Date Met      Progress: Has not attempted golf yet per surgical restrictions as well as weather, goal remains in progress.       New Goals:  Goal Identifier 5 Stairs   Goal Description Pt will ascend and descend a full flight of steps (at least 10) with a reciprocal pattern with one railing to safely negotiate steps he encounters in the community.    Target Date 05/01/21   Date Met      Progress:     Goal Identifier 6 Rising   Goal Description Pt will achieve L knee 2 degrees to 125 degress of knee ext to flex in order to rise from a chair with greater ease.   Target Date 05/01/21   Date Met      Progress:     Progress Toward Goals: Pt reports improved sleeping tolerance but still can get a lot of pain during the noc.  However, no longer using prescription pain meds at noc.  No longer using an single end cane.  Walking at least 500 ft.  Not yet able to regularly use a reciprocal pattern on the stairs.  Knee AROM continues to improve but still limited for extension.  L lower leg and ankle edema resolved, still mild knee edema however.  Skilled PT services continue to be recommended to cont to address the above goals in order to improve pain, mobility, strength and overall function.    Plan: Continue therapy per current plan of care 2x/wk addressing the above goals.    Discharge: No

## 2021-03-04 ENCOUNTER — HOSPITAL ENCOUNTER (OUTPATIENT)
Dept: PHYSICAL THERAPY | Facility: CLINIC | Age: 56
Setting detail: THERAPIES SERIES
End: 2021-03-04
Attending: NURSE PRACTITIONER
Payer: COMMERCIAL

## 2021-03-04 PROCEDURE — 97110 THERAPEUTIC EXERCISES: CPT | Mod: GP | Performed by: PHYSICAL THERAPIST

## 2021-03-04 PROCEDURE — 97116 GAIT TRAINING THERAPY: CPT | Mod: GP | Performed by: PHYSICAL THERAPIST

## 2021-03-08 ENCOUNTER — HOSPITAL ENCOUNTER (OUTPATIENT)
Dept: PHYSICAL THERAPY | Facility: CLINIC | Age: 56
Setting detail: THERAPIES SERIES
End: 2021-03-08
Attending: NURSE PRACTITIONER
Payer: COMMERCIAL

## 2021-03-08 PROCEDURE — 97110 THERAPEUTIC EXERCISES: CPT | Mod: GP | Performed by: PHYSICAL THERAPIST

## 2021-03-08 PROCEDURE — 97116 GAIT TRAINING THERAPY: CPT | Mod: GP | Performed by: PHYSICAL THERAPIST

## 2021-03-11 ENCOUNTER — HOSPITAL ENCOUNTER (OUTPATIENT)
Dept: PHYSICAL THERAPY | Facility: CLINIC | Age: 56
Setting detail: THERAPIES SERIES
End: 2021-03-11
Attending: NURSE PRACTITIONER
Payer: COMMERCIAL

## 2021-03-11 PROCEDURE — 97140 MANUAL THERAPY 1/> REGIONS: CPT | Mod: GP

## 2021-03-11 PROCEDURE — 97110 THERAPEUTIC EXERCISES: CPT | Mod: GP

## 2021-03-15 ENCOUNTER — HOSPITAL ENCOUNTER (OUTPATIENT)
Dept: PHYSICAL THERAPY | Facility: CLINIC | Age: 56
Setting detail: THERAPIES SERIES
End: 2021-03-15
Attending: NURSE PRACTITIONER
Payer: COMMERCIAL

## 2021-03-15 PROCEDURE — 97110 THERAPEUTIC EXERCISES: CPT | Mod: GP | Performed by: PHYSICAL THERAPIST

## 2021-03-15 PROCEDURE — 97140 MANUAL THERAPY 1/> REGIONS: CPT | Mod: GP | Performed by: PHYSICAL THERAPIST

## 2021-03-18 ENCOUNTER — HOSPITAL ENCOUNTER (OUTPATIENT)
Dept: PHYSICAL THERAPY | Facility: CLINIC | Age: 56
Setting detail: THERAPIES SERIES
End: 2021-03-18
Attending: NURSE PRACTITIONER
Payer: COMMERCIAL

## 2021-03-18 PROCEDURE — 97140 MANUAL THERAPY 1/> REGIONS: CPT | Mod: GP

## 2021-03-18 PROCEDURE — 97110 THERAPEUTIC EXERCISES: CPT | Mod: GP

## 2021-03-22 ENCOUNTER — HOSPITAL ENCOUNTER (OUTPATIENT)
Dept: PHYSICAL THERAPY | Facility: CLINIC | Age: 56
Setting detail: THERAPIES SERIES
End: 2021-03-22
Attending: NURSE PRACTITIONER
Payer: COMMERCIAL

## 2021-03-22 PROCEDURE — 97140 MANUAL THERAPY 1/> REGIONS: CPT | Mod: GP | Performed by: PHYSICAL THERAPIST

## 2021-03-22 PROCEDURE — 97110 THERAPEUTIC EXERCISES: CPT | Mod: GP | Performed by: PHYSICAL THERAPIST

## 2021-03-22 NOTE — PROGRESS NOTES
Outpatient Physical Therapy Progress Note     Patient: Amol Peres    : 1965    Beginning/End Dates of Reporting Period: 3/1/2021 to 3/22/2021    Referring Provider: Jessee Bob NP    Therapy Diagnosis: L knee pain and decreased function s/p L TKA     Client Self Report: Tape helped with pain of straigthening and the pain was less with sleeping. Not sure if it did much for the swelling.  Did have some post/med and post/lat pain during the day, it went away the next day.  A bit leary with going down the steps but feels better when has B railing, haven't tried yet with one railing for going down.  Feels he is walking so much better.    Objective Measurements:  Objective Measure: pain  Details: 0/10 at rest    Objective Measure: AROM L knee in supine in degrees  Details: 122-5    Objective Measure: stairs  Details: reciprocal to ascend with one rail and reciprocal pattern to descend with B rail    Objective Measure: sleep pattern  Details: sleeping 6-7 hours per noc on average now    Goals:  Goal Identifier 1 Ambulation   Goal Description Pt will safely ambulate 500 ft or greater for community ambulation without the use of a SEC.   Target Date 21   Date Met  21   Progress:     Goal Identifier 2 Sleep   Goal Description Pt will report he can sleep for 5-6 hrs or more per noc due to reduced L knee pain.  Pt reports he has been 6-7 hours now.   Target Date 21   Date Met  21   Progress:     Goal Identifier 3 LEFS   Goal Description Pt will increase his score on the LEFS (LE Functional Scale) to 60/80 or greater indicating a clinically meaningful improvement in LE pain and function over time.  Most recent score is 37/80.   Target Date 21   Date Met      Progress:     Goal Identifier 4 Sports   Goal Description Pt will be able to return to playing golf due to improve L knee mobility, pain and strength.  Has not attempted playing golf yet but hopes to soon depending on the  weather and clearance from ortho.   Target Date 05/01/21   Date Met      Progress:     Goal Identifier 5 Stairs   Goal Description Pt will ascend and descend a full flight of steps (at least 10) with a reciprocal pattern with one railing to safely negotiate steps he encounters in the community.  Can use one rail with reciprocal pattern to ascend but needs B railing to do reciprocal pattern to descend so goal remains in progress.   Target Date 05/01/21   Date Met      Progress:     Goal Identifier 6 Rising   Goal Description Pt will achieve L knee 2 degrees to 125 degress of knee ext to flex in order to rise from a chair with greater ease.  Improving to 5 to 122 degrees so nearly meeting goal.   Target Date 05/01/21   Date Met      Progress:     Progress Toward Goals: Meeting goals 1 & 2, good progress to goals 5 & 6. Golf goal has not been attempted, did not assess LEFS score again.  Overall, edema is down, ROM continues to improve, gait pattern improving, and sleep tolerance improving.  Would still benefit from ongoing PT to progress strengthening, ability to do stairs, work toward gaining full flex/ext and return to sport.    Plan: Changes to therapy plan of care: 1x/wk beginning next week    Discharge: No

## 2021-03-25 ENCOUNTER — HOSPITAL ENCOUNTER (OUTPATIENT)
Dept: PHYSICAL THERAPY | Facility: CLINIC | Age: 56
Setting detail: THERAPIES SERIES
End: 2021-03-25
Attending: NURSE PRACTITIONER
Payer: COMMERCIAL

## 2021-03-25 PROCEDURE — 97110 THERAPEUTIC EXERCISES: CPT | Mod: GP | Performed by: PHYSICAL THERAPIST

## 2021-03-25 PROCEDURE — 97140 MANUAL THERAPY 1/> REGIONS: CPT | Mod: GP | Performed by: PHYSICAL THERAPIST

## 2021-03-29 ENCOUNTER — HOSPITAL ENCOUNTER (OUTPATIENT)
Dept: PHYSICAL THERAPY | Facility: CLINIC | Age: 56
Setting detail: THERAPIES SERIES
End: 2021-03-29
Attending: NURSE PRACTITIONER
Payer: COMMERCIAL

## 2021-03-29 PROCEDURE — 97112 NEUROMUSCULAR REEDUCATION: CPT | Mod: GP | Performed by: PHYSICAL THERAPIST

## 2021-03-29 PROCEDURE — 97110 THERAPEUTIC EXERCISES: CPT | Mod: GP | Performed by: PHYSICAL THERAPIST

## 2021-04-01 DIAGNOSIS — M25.562 CHRONIC PAIN OF LEFT KNEE: Primary | ICD-10-CM

## 2021-04-01 DIAGNOSIS — G89.29 CHRONIC PAIN OF LEFT KNEE: Primary | ICD-10-CM

## 2021-04-01 DIAGNOSIS — M22.42 CHONDROMALACIA PATELLAE OF LEFT KNEE: ICD-10-CM

## 2021-04-01 RX ORDER — IBUPROFEN 800 MG/1
800 TABLET, FILM COATED ORAL EVERY 8 HOURS PRN
Qty: 270 TABLET | Refills: 3 | Status: SHIPPED | OUTPATIENT
Start: 2021-04-01 | End: 2022-05-12

## 2021-04-05 ENCOUNTER — OFFICE VISIT (OUTPATIENT)
Dept: ORTHOPEDICS | Facility: CLINIC | Age: 56
End: 2021-04-05
Payer: COMMERCIAL

## 2021-04-05 ENCOUNTER — HOSPITAL ENCOUNTER (OUTPATIENT)
Dept: PHYSICAL THERAPY | Facility: CLINIC | Age: 56
Setting detail: THERAPIES SERIES
End: 2021-04-05
Attending: NURSE PRACTITIONER
Payer: COMMERCIAL

## 2021-04-05 ENCOUNTER — ANCILLARY PROCEDURE (OUTPATIENT)
Dept: GENERAL RADIOLOGY | Facility: CLINIC | Age: 56
End: 2021-04-05
Attending: ORTHOPAEDIC SURGERY
Payer: COMMERCIAL

## 2021-04-05 VITALS
SYSTOLIC BLOOD PRESSURE: 120 MMHG | DIASTOLIC BLOOD PRESSURE: 72 MMHG | HEIGHT: 72 IN | BODY MASS INDEX: 38.85 KG/M2 | WEIGHT: 286.8 LBS

## 2021-04-05 DIAGNOSIS — Z96.652 S/P TOTAL KNEE REPLACEMENT USING CEMENT, LEFT: Primary | ICD-10-CM

## 2021-04-05 DIAGNOSIS — M17.11 PRIMARY OSTEOARTHRITIS OF RIGHT KNEE: ICD-10-CM

## 2021-04-05 DIAGNOSIS — Z96.652 S/P TOTAL KNEE REPLACEMENT USING CEMENT, LEFT: ICD-10-CM

## 2021-04-05 PROCEDURE — 97110 THERAPEUTIC EXERCISES: CPT | Mod: GP | Performed by: PHYSICAL THERAPIST

## 2021-04-05 PROCEDURE — 73562 X-RAY EXAM OF KNEE 3: CPT | Mod: TC | Performed by: RADIOLOGY

## 2021-04-05 PROCEDURE — 97140 MANUAL THERAPY 1/> REGIONS: CPT | Mod: GP | Performed by: PHYSICAL THERAPIST

## 2021-04-05 PROCEDURE — 99024 POSTOP FOLLOW-UP VISIT: CPT | Performed by: ORTHOPAEDIC SURGERY

## 2021-04-05 PROCEDURE — 20610 DRAIN/INJ JOINT/BURSA W/O US: CPT | Mod: 79 | Performed by: ORTHOPAEDIC SURGERY

## 2021-04-05 PROCEDURE — 97112 NEUROMUSCULAR REEDUCATION: CPT | Mod: GP | Performed by: PHYSICAL THERAPIST

## 2021-04-05 RX ORDER — TRIAMCINOLONE ACETONIDE 40 MG/ML
40 INJECTION, SUSPENSION INTRA-ARTICULAR; INTRAMUSCULAR ONCE
Status: COMPLETED | OUTPATIENT
Start: 2021-04-05 | End: 2021-04-05

## 2021-04-05 RX ADMIN — TRIAMCINOLONE ACETONIDE 40 MG: 40 INJECTION, SUSPENSION INTRA-ARTICULAR; INTRAMUSCULAR at 08:52

## 2021-04-05 ASSESSMENT — MIFFLIN-ST. JEOR: SCORE: 2165.98

## 2021-04-05 ASSESSMENT — PAIN SCALES - GENERAL: PAINLEVEL: NO PAIN (0)

## 2021-04-05 NOTE — PROGRESS NOTES
Orthopedic Clinic Post-Operative Note    CHIEF COMPLAINT:   Chief Complaint   Patient presents with     RECHECK     left knee follow up     Surgical Followup     DOS: 1/12/2021~ Left total knee arthroplasty~12 weeks postop       HISTORY OF PRESENT ILLNESS  Overall progressing.  He had some recent small setback with a twisting injury.  Cause pain in the knee for about 1/2-hour then resolved.  Feels like is making progress.    His contralateral knee has progressively worsened.  He would like an injection.  No specific injuries or traumas.  Patient's past medical, surgical, social and family histories reviewed.     Past Medical History:   Diagnosis Date     Chondromalacia patellae of left knee 11/28/2014     CTS (carpal tunnel syndrome) 11/28/2014    right > left      GERD (gastroesophageal reflux disease)      Hiatal hernia 2008     Hypertension 3/26/2010     Mild airflow obstruction on pulmonary function test 4/6/2016     Morbid obesity with body mass index of 40.0-44.9 in adult (H) 3/16/2016     Tobacco use disorder 3/16/2016       Past Surgical History:   Procedure Laterality Date     APPENDECTOMY  04/04/10     ARTHROPLASTY KNEE Left 1/12/2021    Procedure: left total knee replacement;  Surgeon: Joel Quezada DO;  Location: [facility]     COLONOSCOPY N/A 10/16/2015    Procedure: COMBINED COLONOSCOPY, SINGLE OR MULTIPLE BIOPSY/POLYPECTOMY BY BIOPSY;  Surgeon: Marcial Colvin MD;  Location:  GI     ESOPHAGOSCOPY, GASTROSCOPY, DUODENOSCOPY (EGD), COMBINED  12/27/2010    COMBINED ESOPHAGOSCOPY, GASTROSCOPY, DUODENOSCOPY (EGD), BIOPSY SINGLE OR MULTIPLE performed by JUSTINA CAR at  GI     ESOPHAGOSCOPY, GASTROSCOPY, DUODENOSCOPY (EGD), COMBINED  12/26/2012    Procedure: COMBINED ESOPHAGOSCOPY, GASTROSCOPY, DUODENOSCOPY (EGD), BIOPSY SINGLE OR MULTIPLE;  ESOPHAGOSCOPY, GASTROSCOPY, DUODENOSCOPY WITH  SINGLE BIOPSY;  Surgeon: Justina Car MD;  Location:  GI     ESOPHAGOSCOPY, GASTROSCOPY,  DUODENOSCOPY (EGD), COMBINED N/A 10/16/2015    Procedure: COMBINED ESOPHAGOSCOPY, GASTROSCOPY, DUODENOSCOPY (EGD), BIOPSY SINGLE OR MULTIPLE;  Surgeon: Marcial Colvin MD;  [facility]     ESOPHAGOSCOPY, GASTROSCOPY, DUODENOSCOPY (EGD), COMBINED N/A 9/1/2017    Procedure: COMBINED ESOPHAGOSCOPY, GASTROSCOPY, DUODENOSCOPY (EGD), BIOPSY SINGLE OR MULTIPLE;  Esophagogastroduodenoscopy, Biopsy by Biopsy;  Surgeon: Kevin Fung MD;  Location: [facility]      UGI ENDOSCOPY DIAG W BIOPSY  8/28/2009     [facility], SIMPLE EXAM  09/23/08       Medications:  ibuprofen (ADVIL/MOTRIN) 800 MG tablet, Take 1 tablet (800 mg) by mouth every 8 hours as needed for moderate pain  acetaminophen (TYLENOL) 325 MG tablet, Take 3 tablets (975 mg) by mouth every 8 hours as needed for other (mild pain) (Patient not taking: Reported on 4/5/2021)  omeprazole (PRILOSEC) 40 MG DR capsule, Take 1 capsule (40 mg) by mouth daily (Patient not taking: Reported on 4/5/2021)    No current facility-administered medications on file prior to visit.       Allergies   Allergen Reactions     No Known Drug Allergies        Social History     Occupational History     Not on file   Tobacco Use     Smoking status: Current Every Day Smoker     Packs/day: 1.00     Smokeless tobacco: Never Used   Substance and Sexual Activity     Alcohol use: Yes     Comment: occasionally     Drug use: No     Sexual activity: Yes     Partners: Female       Family History   Problem Relation Age of Onset     Cancer Mother         Cervical     Cancer Maternal Grandfather      Cancer Paternal Grandmother        REVIEW OF SYSTEMS  General: negative for, night sweats, dizziness, fatigue  Resp: No shortness of breath and no cough  CV: negative for chest pain, syncope or near-syncope  GI: negative for nausea, vomiting and diarrhea  : negative for dysuria and hematuria  Musculoskeletal: as above  Neurologic: negative for syncope   Hematologic: negative for bleeding  "disorder    Physical Exam:  Vitals: /72   Ht 1.832 m (6' 0.13\")   Wt 130.1 kg (286 lb 12.8 oz)   BMI 38.76 kg/m    BMI= Body mass index is 38.76 kg/m .  Constitutional: healthy, alert and no acute distress   Psychiatric: mentation appears normal and affect normal/bright  NEURO: no focal deficits  SKIN: .well healed, no erythema, no incision breakdown and no drainage.  JOINT/EXTREMITIES: Left knee: Some soft tissue swelling.  However no lymph evidence of infection.  Active motion 3-120 degrees.  No instability.  Patella tracks midline.    Right knee: No infection.  Small effusion  GAIT: not tested     Diagnostic Modalities:  left knee X-ray: The prosthesis has acceptable alignment. No fractures or dislocations. Prosthesis is well seated with no evidence of loosening  Independent visualization of the images was performed.      Impression:   Chief Complaint   Patient presents with     RECHECK     left knee follow up     Surgical Followup     DOS: 1/12/2021~ Left total knee arthroplasty~12 weeks postop   Right knee primary osteoarthritis    Plan:   Continue progressing left knee.  Expect some ups and downs.  Continue working on range of motion strengthening.    Right knee exacerbated.  Previous x-rays reviewed showing some arthritis.  Per his request we proceeded with an injection today.  The patient was counseled about an  injection, including discussion of risks (including infection), contents of the injection, rationale for performing the injection, and expected benefits of the injection. The skin was prepped with alcohol and betadine and then utilizing sterile technique an injection of the right knee joint from the anterolateral approach in the seated position was performed. The injection consisted 1ml of Kenalog (40mg per 1 ml) mixed with 3ml of 0.5% Marcaine. The patient tolerated the injection well, and there were no complications. The injection site was covered with a Band-Aid. The injection was " performed by Sorin Bob, APRN, CNP, DNP    If any concerns for infection seek immediate medical evaluation either in the clinic or the ED.       Return to clinic 1 year anniversary for the left knee or as needed right knee, or sooner as needed for changes.    Re-x-ray on return: Yes.    Richard Quezada D.O.

## 2021-04-05 NOTE — LETTER
4/5/2021         RE: Amol Peres  [address]  [place] [address]        Dear Colleague,    Thank you for referring your patient, Amol Peres, to the [facility]. Please see a copy of my visit note below.    Orthopedic Clinic Post-Operative Note    CHIEF COMPLAINT:   Chief Complaint   Patient presents with     RECHECK     left knee follow up     Surgical Followup     DOS: 1/12/2021~ Left total knee arthroplasty~12 weeks postop       HISTORY OF PRESENT ILLNESS  Overall progressing.  He had some recent small setback with a twisting injury.  Cause pain in the knee for about 1/2-hour then resolved.  Feels like is making progress.    His contralateral knee has progressively worsened.  He would like an injection.  No specific injuries or traumas.  Patient's past medical, surgical, social and family histories reviewed.     Past Medical History:   Diagnosis Date     Chondromalacia patellae of left knee 11/28/2014     CTS (carpal tunnel syndrome) 11/28/2014    right > left      GERD (gastroesophageal reflux disease)      Hiatal hernia 2008     Hypertension 3/26/2010     Mild airflow obstruction on pulmonary function test 4/6/2016     Morbid obesity with body mass index of 40.0-44.9 in adult (H) 3/16/2016     Tobacco use disorder 3/16/2016       Past Surgical History:   Procedure Laterality Date     APPENDECTOMY  04/04/10     ARTHROPLASTY KNEE Left 1/12/2021    Procedure: left total knee replacement;  Surgeon: Joel Quezada DO;  Location:  OR     COLONOSCOPY N/A 10/16/2015    Procedure: COMBINED COLONOSCOPY, SINGLE OR MULTIPLE BIOPSY/POLYPECTOMY BY BIOPSY;  Surgeon: Marcial Colvin MD;  Location:  GI     ESOPHAGOSCOPY, GASTROSCOPY, DUODENOSCOPY (EGD), COMBINED  12/27/2010    COMBINED ESOPHAGOSCOPY, GASTROSCOPY, DUODENOSCOPY (EGD), BIOPSY SINGLE OR MULTIPLE performed by JUSTINA MORGAN at  GI     ESOPHAGOSCOPY, GASTROSCOPY, DUODENOSCOPY (EGD), COMBINED   12/26/2012    Procedure: COMBINED ESOPHAGOSCOPY, GASTROSCOPY, DUODENOSCOPY (EGD), BIOPSY SINGLE OR MULTIPLE;  ESOPHAGOSCOPY, GASTROSCOPY, DUODENOSCOPY WITH  SINGLE BIOPSY;  Surgeon: Patrice Car MD;  Location: [facility]     ESOPHAGOSCOPY, GASTROSCOPY, DUODENOSCOPY (EGD), COMBINED N/A 10/16/2015    Procedure: COMBINED ESOPHAGOSCOPY, GASTROSCOPY, DUODENOSCOPY (EGD), BIOPSY SINGLE OR MULTIPLE;  Surgeon: Marcial Colvin MD;  Location: [facility]     ESOPHAGOSCOPY, GASTROSCOPY, DUODENOSCOPY (EGD), COMBINED N/A 9/1/2017    Procedure: COMBINED ESOPHAGOSCOPY, GASTROSCOPY, DUODENOSCOPY (EGD), BIOPSY SINGLE OR MULTIPLE;  Esophagogastroduodenoscopy, Biopsy by Biopsy;  Surgeon: Kevin Fung MD;  Location: [facility]      UGI ENDOSCOPY DIAG W BIOPSY  8/28/2009     [facility] UGI ENDOSCOPY, SIMPLE EXAM  09/23/08       Medications:  ibuprofen (ADVIL/MOTRIN) 800 MG tablet, Take 1 tablet (800 mg) by mouth every 8 hours as needed for moderate pain  acetaminophen (TYLENOL) 325 MG tablet, Take 3 tablets (975 mg) by mouth every 8 hours as needed for other (mild pain) (Patient not taking: Reported on 4/5/2021)  omeprazole (PRILOSEC) 40 MG DR capsule, Take 1 capsule (40 mg) by mouth daily (Patient not taking: Reported on 4/5/2021)    No current facility-administered medications on file prior to visit.       Allergies   Allergen Reactions     No Known Drug Allergies        Social History     Occupational History     Not on file   Tobacco Use     Smoking status: Current Every Day Smoker     Packs/day: 1.00     Smokeless tobacco: Never Used   Substance and Sexual Activity     Alcohol use: Yes     Comment: occasionally     Drug use: No     Sexual activity: Yes     Partners: Female       Family History   Problem Relation Age of Onset     Cancer Mother         Cervical     Cancer Maternal Grandfather      Cancer Paternal Grandmother        REVIEW OF SYSTEMS  General: negative for, night sweats, dizziness, fatigue  Resp: No shortness of  "breath and no cough  CV: negative for chest pain, syncope or near-syncope  GI: negative for nausea, vomiting and diarrhea  : negative for dysuria and hematuria  Musculoskeletal: as above  Neurologic: negative for syncope   Hematologic: negative for bleeding disorder    Physical Exam:  Vitals: /72   Ht 1.832 m (6' 0.13\")   Wt 130.1 kg (286 lb 12.8 oz)   BMI 38.76 kg/m    BMI= Body mass index is 38.76 kg/m .  Constitutional: healthy, alert and no acute distress   Psychiatric: mentation appears normal and affect normal/bright  NEURO: no focal deficits  SKIN: .well healed, no erythema, no incision breakdown and no drainage.  JOINT/EXTREMITIES: Left knee: Some soft tissue swelling.  However no lymph evidence of infection.  Active motion 3-120 degrees.  No instability.  Patella tracks midline.    Right knee: No infection.  Small effusion  GAIT: not tested     Diagnostic Modalities:  left knee X-ray: The prosthesis has acceptable alignment. No fractures or dislocations. Prosthesis is well seated with no evidence of loosening  Independent visualization of the images was performed.      Impression:   Chief Complaint   Patient presents with     RECHECK     left knee follow up     Surgical Followup     DOS: 1/12/2021~ Left total knee arthroplasty~12 weeks postop   Right knee primary osteoarthritis    Plan:   Continue progressing left knee.  Expect some ups and downs.  Continue working on range of motion strengthening.    Right knee exacerbated.  Previous x-rays reviewed showing some arthritis.  Per his request we proceeded with an injection today.  The patient was counseled about an  injection, including discussion of risks (including infection), contents of the injection, rationale for performing the injection, and expected benefits of the injection. The skin was prepped with alcohol and betadine and then utilizing sterile technique an injection of the right knee joint from the anterolateral approach in the seated " position was performed. The injection consisted 1ml of Kenalog (40mg per 1 ml) mixed with 3ml of 0.5% Marcaine. The patient tolerated the injection well, and there were no complications. The injection site was covered with a Band-Aid. The injection was performed by DARLEEN Hoyos, CNP, TEZ    If any concerns for infection seek immediate medical evaluation either in the clinic or the ED.       Return to clinic 1 year anniversary for the left knee or as needed right knee, or sooner as needed for changes.    Re-x-ray on return: Yes.    Richard Quezada D.O.    Clinic Administered Medication Documentation      Injection Documentation     Injection was administered by provider (please see MAR for given by information). Please see MAR and medication order for additional information.     Site: Joint injection   Medication Used: Kenalog 40mg   Expiration Date:  10/2022        The following medication was given by DARLEEN Esparza, CNP, DNP:     MEDICATION: Kenalog 40mg/1ml  ROUTE: Joint Injection  SITE: right knee  DOSE: 1 mL  LOT #: [id number]  : [employer]  EXPIRATION DATE:  10/2022  NDC: [address]-3                          Again, thank you for allowing me to participate in the care of your patient.        Sincerely,        Joel Quezada, DO

## 2021-04-05 NOTE — PROGRESS NOTES
Clinic Administered Medication Documentation      Injection Documentation     Injection was administered by provider (please see MAR for given by information). Please see MAR and medication order for additional information.     Site: Joint injection   Medication Used: Kenalog 40mg   Expiration Date:  10/2022        The following medication was given by DARLEEN Esparza, CNP, DNP:     MEDICATION: Kenalog 40mg/1ml  ROUTE: Joint Injection  SITE: right knee  DOSE: 1 mL  LOT #: [id number]  : [employer]  EXPIRATION DATE:  10/2022  NDC: [address]-8

## 2021-04-12 ENCOUNTER — HOSPITAL ENCOUNTER (OUTPATIENT)
Dept: PHYSICAL THERAPY | Facility: CLINIC | Age: 56
Setting detail: THERAPIES SERIES
End: 2021-04-12
Attending: NURSE PRACTITIONER
Payer: COMMERCIAL

## 2021-04-12 PROCEDURE — 97110 THERAPEUTIC EXERCISES: CPT | Mod: GP | Performed by: PHYSICAL THERAPIST

## 2021-05-03 ENCOUNTER — HOSPITAL ENCOUNTER (OUTPATIENT)
Dept: PHYSICAL THERAPY | Facility: CLINIC | Age: 56
Setting detail: THERAPIES SERIES
End: 2021-05-03
Attending: NURSE PRACTITIONER
Payer: COMMERCIAL

## 2021-05-03 PROCEDURE — 97110 THERAPEUTIC EXERCISES: CPT | Mod: GP | Performed by: PHYSICAL THERAPIST

## 2021-05-09 ENCOUNTER — HEALTH MAINTENANCE LETTER (OUTPATIENT)
Age: 56
End: 2021-05-09

## 2021-06-24 ENCOUNTER — OFFICE VISIT (OUTPATIENT)
Dept: ORTHOPEDICS | Facility: CLINIC | Age: 56
End: 2021-06-24
Payer: COMMERCIAL

## 2021-06-24 ENCOUNTER — ANCILLARY PROCEDURE (OUTPATIENT)
Dept: GENERAL RADIOLOGY | Facility: CLINIC | Age: 56
End: 2021-06-24
Attending: ORTHOPAEDIC SURGERY
Payer: COMMERCIAL

## 2021-06-24 VITALS
HEIGHT: 72 IN | WEIGHT: 274.5 LBS | DIASTOLIC BLOOD PRESSURE: 74 MMHG | SYSTOLIC BLOOD PRESSURE: 130 MMHG | BODY MASS INDEX: 37.18 KG/M2

## 2021-06-24 DIAGNOSIS — M70.50 PES ANSERINE BURSITIS: Primary | ICD-10-CM

## 2021-06-24 DIAGNOSIS — Z96.652 S/P TOTAL KNEE REPLACEMENT USING CEMENT, LEFT: ICD-10-CM

## 2021-06-24 PROCEDURE — 73562 X-RAY EXAM OF KNEE 3: CPT | Mod: TC | Performed by: RADIOLOGY

## 2021-06-24 PROCEDURE — 20610 DRAIN/INJ JOINT/BURSA W/O US: CPT | Mod: LT | Performed by: ORTHOPAEDIC SURGERY

## 2021-06-24 PROCEDURE — 99213 OFFICE O/P EST LOW 20 MIN: CPT | Mod: 25 | Performed by: ORTHOPAEDIC SURGERY

## 2021-06-24 RX ORDER — BUPIVACAINE HYDROCHLORIDE 5 MG/ML
3 INJECTION, SOLUTION PERINEURAL ONCE
Status: COMPLETED | OUTPATIENT
Start: 2021-06-24 | End: 2021-06-24

## 2021-06-24 RX ORDER — TRIAMCINOLONE ACETONIDE 40 MG/ML
40 INJECTION, SUSPENSION INTRA-ARTICULAR; INTRAMUSCULAR ONCE
Status: COMPLETED | OUTPATIENT
Start: 2021-06-24 | End: 2021-06-24

## 2021-06-24 RX ADMIN — BUPIVACAINE HYDROCHLORIDE 15 MG: 5 INJECTION, SOLUTION PERINEURAL at 14:37

## 2021-06-24 RX ADMIN — TRIAMCINOLONE ACETONIDE 40 MG: 40 INJECTION, SUSPENSION INTRA-ARTICULAR; INTRAMUSCULAR at 14:38

## 2021-06-24 ASSESSMENT — MIFFLIN-ST. JEOR: SCORE: 2105.18

## 2021-06-24 ASSESSMENT — PAIN SCALES - GENERAL: PAINLEVEL: MILD PAIN (2)

## 2021-06-24 NOTE — LETTER
6/24/2021         RE: Amol Peres  [address]  [place] [address]        Dear Colleague,    Thank you for referring your patient, Amol Peres, to the [facility]. Please see a copy of my visit note below.    Orthopedic Clinic Post-Operative Note    CHIEF COMPLAINT:   Chief Complaint   Patient presents with     RECHECK     left knee follow up     Surgical Followup     DOS: 1/12/2021~ Left total knee arthroplasty~5  months postop       HISTORY OF PRESENT ILLNESS  Returns to clinic for the left knee. States knee was doing very well up until about 1 to 1.5 months ago. He had not been playing any golf but then at that same time he played 18 holes. No specific injuries or issues that day The next day while mowing start having severer pain to the left knee. Described as 2/10 at rest, 7/10 with weight bearing. Distal medial knee pain that can radiate around the knee and down into the foot at times. No numbness/tingling.   Taking 800mg ibuprofen at night occasionally which does help. No fevers, no trauma, no systemic symptoms. No open wounds or erythema to the knee. Thinks some swelling.       Patient's past medical, surgical, social and family histories reviewed.     Past Medical History:   Diagnosis Date     Chondromalacia patellae of left knee 11/28/2014     CTS (carpal tunnel syndrome) 11/28/2014    right > left      GERD (gastroesophageal reflux disease)      Hiatal hernia 2008     Hypertension 3/26/2010     Mild airflow obstruction on pulmonary function test 4/6/2016     Morbid obesity with body mass index of 40.0-44.9 in adult (H) 3/16/2016     Tobacco use disorder 3/16/2016       Past Surgical History:   Procedure Laterality Date     APPENDECTOMY  04/04/10     ARTHROPLASTY KNEE Left 1/12/2021    Procedure: left total knee replacement;  Surgeon: Joel Quezada DO;  Location: [facility]     COLONOSCOPY N/A 10/16/2015    Procedure: COMBINED COLONOSCOPY, SINGLE OR  MULTIPLE BIOPSY/POLYPECTOMY BY BIOPSY;  Surgeon: Marcial Colvin MD;  Location:  GI     ESOPHAGOSCOPY, GASTROSCOPY, DUODENOSCOPY (EGD), COMBINED  12/27/2010    COMBINED ESOPHAGOSCOPY, GASTROSCOPY, DUODENOSCOPY (EGD), BIOPSY SINGLE OR MULTIPLE performed by JUSTINA CAR at [facility]     ESOPHAGOSCOPY, GASTROSCOPY, DUODENOSCOPY (EGD), COMBINED  12/26/2012    Procedure: COMBINED ESOPHAGOSCOPY, GASTROSCOPY, DUODENOSCOPY (EGD), BIOPSY SINGLE OR MULTIPLE;  ESOPHAGOSCOPY, GASTROSCOPY, DUODENOSCOPY WITH  SINGLE BIOPSY;  Surgeon: Justina Car MD;  Location:  GI     ESOPHAGOSCOPY, GASTROSCOPY, DUODENOSCOPY (EGD), COMBINED N/A 10/16/2015    Procedure: COMBINED ESOPHAGOSCOPY, GASTROSCOPY, DUODENOSCOPY (EGD), BIOPSY SINGLE OR MULTIPLE;  Surgeon: Marcial Colvin MD;  Location: [facility]     ESOPHAGOSCOPY, GASTROSCOPY, DUODENOSCOPY (EGD), COMBINED N/A 9/1/2017    Procedure: COMBINED ESOPHAGOSCOPY, GASTROSCOPY, DUODENOSCOPY (EGD), BIOPSY SINGLE OR MULTIPLE;  Esophagogastroduodenoscopy, Biopsy by Biopsy;  Surgeon: Kevin Fung MD;  Location: [facility] UGI ENDOSCOPY DIAG W BIOPSY  8/28/2009     [facility] UGI ENDOSCOPY, SIMPLE EXAM  09/23/08       Medications:  ibuprofen (ADVIL/MOTRIN) 800 MG tablet, Take 1 tablet (800 mg) by mouth every 8 hours as needed for moderate pain    No current facility-administered medications on file prior to visit.       Allergies   Allergen Reactions     No Known Drug Allergies        Social History     Occupational History     Not on file   Tobacco Use     Smoking status: Current Every Day Smoker     Packs/day: 1.00     Smokeless tobacco: Never Used   Substance and Sexual Activity     Alcohol use: Yes     Comment: occasionally     Drug use: No     Sexual activity: Yes     Partners: Female       Family History   Problem Relation Age of Onset     Cancer Mother         Cervical     Cancer Maternal Grandfather      Cancer Paternal Grandmother        REVIEW OF SYSTEMS  General:  "negative for, night sweats, dizziness, fatigue  Resp: No shortness of breath and no cough  CV: negative for chest pain, syncope or near-syncope  GI: negative for nausea, vomiting and diarrhea  : negative for dysuria and hematuria  Musculoskeletal: as above  Neurologic: negative for syncope   Hematologic: negative for bleeding disorder    Physical Exam:  Vitals: /74   Ht 1.832 m (6' 0.13\")   Wt 124.5 kg (274 lb 8 oz)   BMI 37.09 kg/m    BMI= Body mass index is 37.09 kg/m .  Constitutional: healthy, alert and no acute distress   Psychiatric: mentation appears normal and affect normal/bright  NEURO: no focal deficits  SKIN: .well healed, no erythema, no incision breakdown and no drainage.  JOINT/EXTREMITIES: left knee: small effusion. No evidence of infection. AROM 0-105. PROM 0-110.  Some swelling to the pes anserine bursa. Tender to this area as well. No specific joint line tenderness. No instability with valgus or varus. Extensor intact. Calf soft non-tender.   GAIT: not tested     Diagnostic Modalities:  left knee X-ray: The prosthesis has acceptable alignment. No fractures or dislocations. Prosthesis is well seated with no evidence of loosening  Independent visualization of the images was performed.      Impression:   Chief Complaint   Patient presents with     RECHECK     left knee follow up     Surgical Followup     DOS: 1/12/2021~ Left total knee arthroplasty~5  months postop   Pes Anserine bursitis following new activity    Plan:   Was doing very well up until 4 to 6 weeks ago.  Then after playing golf for the first time since before surgery had new onset pain. It is mostly located to the pes bursa and recreated with palpation.  No evidence of infection, imaging reassuring.  Likely this result of new activity. Recommended activity modification and avoidance of painful activity while still having pain, steroid injection, and could increase the IBU to TID with meals as he has been tolerating this and " helping with the pain.  He is agreeable.     The patient was counseled about an  injection, including discussion of risks (including infection), contents of the injection, rationale for performing the injection, and expected benefits of the injection. The skin was prepped with alcohol and betadine and then utilizing sterile technique an injection of the left knee pes anserine bursa was performed. The injection consisted 1ml of Kenalog (40mg per 1 ml) mixed with 3ml of 0.5% Marcaine. The patient tolerated the injection well, and there were no complications. The injection site was covered with a Band-Aid. The injection was performed by DARLEEN Hoyos, CNP, DNP      If any concerns for infection seek immediate medical evaluation either in the clinic or the ED.         Return to clinic 6 weeks if not improving, or sooner as needed for changes.    Re-x-ray on return: No    Scribed by:  DARLEEN Hoyos, CNP  4:49 PM  6/24/2021  The information in this document, created by a scribe for me, accurately reflects the services I personally performed and the decisions made by me. I have reviewed and approved this document for accuracy    Joel Quezada DO           Again, thank you for allowing me to participate in the care of your patient.        Sincerely,        Joel Quezada DO

## 2021-06-24 NOTE — PROGRESS NOTES
Orthopedic Clinic Post-Operative Note    CHIEF COMPLAINT:   Chief Complaint   Patient presents with     RECHECK     left knee follow up     Surgical Followup     DOS: 1/12/2021~ Left total knee arthroplasty~5  months postop       HISTORY OF PRESENT ILLNESS  Returns to clinic for the left knee. States knee was doing very well up until about 1 to 1.5 months ago. He had not been playing any golf but then at that same time he played 18 holes. No specific injuries or issues that day The next day while mowing start having severer pain to the left knee. Described as 2/10 at rest, 7/10 with weight bearing. Distal medial knee pain that can radiate around the knee and down into the foot at times. No numbness/tingling.   Taking 800mg ibuprofen at night occasionally which does help. No fevers, no trauma, no systemic symptoms. No open wounds or erythema to the knee. Thinks some swelling.       Patient's past medical, surgical, social and family histories reviewed.     Past Medical History:   Diagnosis Date     Chondromalacia patellae of left knee 11/28/2014     CTS (carpal tunnel syndrome) 11/28/2014    right > left      GERD (gastroesophageal reflux disease)      Hiatal hernia 2008     Hypertension 3/26/2010     Mild airflow obstruction on pulmonary function test 4/6/2016     Morbid obesity with body mass index of 40.0-44.9 in adult (H) 3/16/2016     Tobacco use disorder 3/16/2016       Past Surgical History:   Procedure Laterality Date     APPENDECTOMY  04/04/10     ARTHROPLASTY KNEE Left 1/12/2021    Procedure: left total knee replacement;  Surgeon: Joel Quezada DO;  Location: [facility]     COLONOSCOPY N/A 10/16/2015    Procedure: COMBINED COLONOSCOPY, SINGLE OR MULTIPLE BIOPSY/POLYPECTOMY BY BIOPSY;  Surgeon: Marcial Colvin MD;  Location: [facility]     ESOPHAGOSCOPY, GASTROSCOPY, DUODENOSCOPY (EGD), COMBINED  12/27/2010    COMBINED ESOPHAGOSCOPY, GASTROSCOPY, DUODENOSCOPY (EGD), BIOPSY SINGLE OR MULTIPLE performed  by JUSTINA CAR at  GI     ESOPHAGOSCOPY, GASTROSCOPY, DUODENOSCOPY (EGD), COMBINED  12/26/2012    Procedure: COMBINED ESOPHAGOSCOPY, GASTROSCOPY, DUODENOSCOPY (EGD), BIOPSY SINGLE OR MULTIPLE;  ESOPHAGOSCOPY, GASTROSCOPY, DUODENOSCOPY WITH  SINGLE BIOPSY;  Surgeon: Justina Car MD;  Location:  GI     ESOPHAGOSCOPY, GASTROSCOPY, DUODENOSCOPY (EGD), COMBINED N/A 10/16/2015    Procedure: COMBINED ESOPHAGOSCOPY, GASTROSCOPY, DUODENOSCOPY (EGD), BIOPSY SINGLE OR MULTIPLE;  Surgeon: Marcial Colvin MD;  Location:  GI     ESOPHAGOSCOPY, GASTROSCOPY, DUODENOSCOPY (EGD), COMBINED N/A 9/1/2017    Procedure: COMBINED ESOPHAGOSCOPY, GASTROSCOPY, DUODENOSCOPY (EGD), BIOPSY SINGLE OR MULTIPLE;  Esophagogastroduodenoscopy, Biopsy by Biopsy;  Surgeon: Kvein Fung MD;  Location: [facility] UGI ENDOSCOPY DIAG W BIOPSY  8/28/2009     [facility] UGI ENDOSCOPY, SIMPLE EXAM  09/23/08       Medications:  ibuprofen (ADVIL/MOTRIN) 800 MG tablet, Take 1 tablet (800 mg) by mouth every 8 hours as needed for moderate pain    No current facility-administered medications on file prior to visit.       Allergies   Allergen Reactions     No Known Drug Allergies        Social History     Occupational History     Not on file   Tobacco Use     Smoking status: Current Every Day Smoker     Packs/day: 1.00     Smokeless tobacco: Never Used   Substance and Sexual Activity     Alcohol use: Yes     Comment: occasionally     Drug use: No     Sexual activity: Yes     Partners: Female       Family History   Problem Relation Age of Onset     Cancer Mother         Cervical     Cancer Maternal Grandfather      Cancer Paternal Grandmother        REVIEW OF SYSTEMS  General: negative for, night sweats, dizziness, fatigue  Resp: No shortness of breath and no cough  CV: negative for chest pain, syncope or near-syncope  GI: negative for nausea, vomiting and diarrhea  : negative for dysuria and hematuria  Musculoskeletal: as  "above  Neurologic: negative for syncope   Hematologic: negative for bleeding disorder    Physical Exam:  Vitals: /74   Ht 1.832 m (6' 0.13\")   Wt 124.5 kg (274 lb 8 oz)   BMI 37.09 kg/m    BMI= Body mass index is 37.09 kg/m .  Constitutional: healthy, alert and no acute distress   Psychiatric: mentation appears normal and affect normal/bright  NEURO: no focal deficits  SKIN: .well healed, no erythema, no incision breakdown and no drainage.  JOINT/EXTREMITIES: left knee: small effusion. No evidence of infection. AROM 0-105. PROM 0-110.  Some swelling to the pes anserine bursa. Tender to this area as well. No specific joint line tenderness. No instability with valgus or varus. Extensor intact. Calf soft non-tender.   GAIT: not tested     Diagnostic Modalities:  left knee X-ray: The prosthesis has acceptable alignment. No fractures or dislocations. Prosthesis is well seated with no evidence of loosening  Independent visualization of the images was performed.      Impression:   Chief Complaint   Patient presents with     RECHECK     left knee follow up     Surgical Followup     DOS: 1/12/2021~ Left total knee arthroplasty~5  months postop   Pes Anserine bursitis following new activity    Plan:   Was doing very well up until 4 to 6 weeks ago.  Then after playing golf for the first time since before surgery had new onset pain. It is mostly located to the pes bursa and recreated with palpation.  No evidence of infection, imaging reassuring.  Likely this result of new activity. Recommended activity modification and avoidance of painful activity while still having pain, steroid injection, and could increase the IBU to TID with meals as he has been tolerating this and helping with the pain.  He is agreeable.     The patient was counseled about an  injection, including discussion of risks (including infection), contents of the injection, rationale for performing the injection, and expected benefits of the injection. " The skin was prepped with alcohol and betadine and then utilizing sterile technique an injection of the left knee pes anserine bursa was performed. The injection consisted 1ml of Kenalog (40mg per 1 ml) mixed with 3ml of 0.5% Marcaine. The patient tolerated the injection well, and there were no complications. The injection site was covered with a Band-Aid. The injection was performed by DARLEEN Hoyos, CNP, TEZ      If any concerns for infection seek immediate medical evaluation either in the clinic or the ED.         Return to clinic 6 weeks if not improving, or sooner as needed for changes.    Re-x-ray on return: No    Scribed by:  DARLEEN Hoyos CNP  4:49 PM  6/24/2021  The information in this document, created by a scribe for me, accurately reflects the services I personally performed and the decisions made by me. I have reviewed and approved this document for accuracy    Joel Quezada DO

## 2021-07-15 NOTE — PROGRESS NOTES
Outpatient Physical Therapy Discharge Note     Patient: Amol Peres    : 1965    Beginning/End Dates of Reporting Period: 21 to 5/3/21    Referring Provider: Paulino Rodgers Diagnosis: L knee pain and decreased function s/p L TKA     Client Self Report: Golfed for first time, no pain, played well, a bit of pressure with walking up hill.  RTW painting and did a few steps up a ladder without trouble.  Still can't kneel on L knee, calf mm can be sore when goes to get up.  Still numbness on the side.  Did have some pain on Saturday mowing the lawn, was from the knee down, not really in the knee, even in the foot.  Was better with rest yesterday but then had a bad pain with moving quick when phone fell.    Objective Measurements:  Objective Measure: pain  Details: 1/10 more in lower leg    Objective Measure: AROM R knee in supine  Details: 6 to 123 degrees     Objective Measure: LEFS  Details: 70/80 (initially was 37/80)    Goals:  Goal Identifier 1 Ambulation   Goal Description Pt will safely ambulate 500 ft or greater for community ambulation without the use of a SEC.   Target Date 21   Date Met  21   Progress (detail required for progress note):     Goal Identifier 2 Sleep   Goal Description Pt will report he can sleep for 5-6 hrs or more per noc due to reduced L knee pain.  Pt reports he has been 6-7 hours now.   Target Date 21   Date Met  21   Progress (detail required for progress note):     Goal Identifier 3 LEFS   Goal Description Pt will increase his score on the LEFS (LE Functional Scale) to 60/80 or greater indicating a clinically meaningful improvement in LE pain and function over time.  Most recent score is 37/80.   Target Date 21   Date Met  21   Progress (detail required for progress note):     Goal Identifier 4 Sports   Goal Description Pt will be able to return to playing golf due to improve L knee mobility, pain and strength.     Target Date  05/01/21   Date Met  05/03/21   Progress (detail required for progress note):     Goal Identifier 5 Stairs   Goal Description Pt will ascend and descend a full flight of steps (at least 10) with a reciprocal pattern with one railing to safely negotiate steps he encounters in the community.     Target Date 05/01/21   Date Met  04/12/21   Progress (detail required for progress note):     Goal Identifier 6 Rising   Goal Description Pt will achieve L knee 2 degrees to 125 degress of knee ext to flex in order to rise from a chair with greater ease.  6 to 123 today.   Target Date 05/01/21   Date Met      Progress (detail required for progress note):     Progress: meeting 5 of 6 goals, will discharge to [facility], pt feels ready to keep up on program at home, is so grateful that he has been able to return to golfing which was his primary goal    Plan: Discharge from therapy.    Reason for Discharge: goals met or nearly met    Equipment Issued: none    Discharge Plan: Patient to continue home program.

## 2021-10-24 ENCOUNTER — HEALTH MAINTENANCE LETTER (OUTPATIENT)
Age: 56
End: 2021-10-24

## 2022-04-29 DIAGNOSIS — M22.42 CHONDROMALACIA PATELLAE OF LEFT KNEE: ICD-10-CM

## 2022-04-29 DIAGNOSIS — G89.29 CHRONIC PAIN OF LEFT KNEE: ICD-10-CM

## 2022-04-29 DIAGNOSIS — M25.562 CHRONIC PAIN OF LEFT KNEE: ICD-10-CM

## 2022-04-29 RX ORDER — IBUPROFEN 800 MG/1
800 TABLET, FILM COATED ORAL EVERY 8 HOURS PRN
Qty: 270 TABLET | Refills: 3 | OUTPATIENT
Start: 2022-04-29

## 2022-05-04 ENCOUNTER — OFFICE VISIT (OUTPATIENT)
Dept: ORTHOPEDICS | Facility: CLINIC | Age: 57
End: 2022-05-04
Payer: COMMERCIAL

## 2022-05-04 VITALS
WEIGHT: 282.9 LBS | DIASTOLIC BLOOD PRESSURE: 80 MMHG | SYSTOLIC BLOOD PRESSURE: 124 MMHG | HEIGHT: 73 IN | BODY MASS INDEX: 37.49 KG/M2

## 2022-05-04 DIAGNOSIS — M17.11 PRIMARY OSTEOARTHRITIS OF RIGHT KNEE: Primary | ICD-10-CM

## 2022-05-04 PROCEDURE — 20610 DRAIN/INJ JOINT/BURSA W/O US: CPT | Mod: RT | Performed by: PHYSICIAN ASSISTANT

## 2022-05-04 RX ORDER — TRIAMCINOLONE ACETONIDE 40 MG/ML
80 INJECTION, SUSPENSION INTRA-ARTICULAR; INTRAMUSCULAR
Status: SHIPPED | OUTPATIENT
Start: 2022-05-04

## 2022-05-04 RX ORDER — BUPIVACAINE HYDROCHLORIDE 5 MG/ML
3 INJECTION, SOLUTION PERINEURAL
Status: SHIPPED | OUTPATIENT
Start: 2022-05-04

## 2022-05-04 RX ORDER — BETAMETHASONE SODIUM PHOSPHATE AND BETAMETHASONE ACETATE 3; 3 MG/ML; MG/ML
12 INJECTION, SUSPENSION INTRA-ARTICULAR; INTRALESIONAL; INTRAMUSCULAR; SOFT TISSUE
Status: SHIPPED | OUTPATIENT
Start: 2022-05-04

## 2022-05-04 RX ADMIN — BUPIVACAINE HYDROCHLORIDE 3 ML: 5 INJECTION, SOLUTION PERINEURAL at 14:17

## 2022-05-04 RX ADMIN — TRIAMCINOLONE ACETONIDE 80 MG: 40 INJECTION, SUSPENSION INTRA-ARTICULAR; INTRAMUSCULAR at 14:17

## 2022-05-04 RX ADMIN — BETAMETHASONE SODIUM PHOSPHATE AND BETAMETHASONE ACETATE 12 MG: 3; 3 INJECTION, SUSPENSION INTRA-ARTICULAR; INTRALESIONAL; INTRAMUSCULAR; SOFT TISSUE at 14:17

## 2022-05-04 ASSESSMENT — PAIN SCALES - GENERAL: PAINLEVEL: MILD PAIN (2)

## 2022-05-04 NOTE — LETTER
"    5/4/2022         RE: Amol Peres  [address]  [place] [address]        Dear Colleague,    Thank you for referring your patient, Amol Peres, to the [facility]. Please see a copy of my visit note below.    Office Visit-Follow up    Chief Complaint: Amol Peres is a 56 year old male who is being seen for   Chief Complaint   Patient presents with     RECHECK     Right knee primary osteoarthritis       History of Present Illness:   Patient is here in follow-up steroid injection.  Patient's last steroid injection was the summer 2021.  This lasted about 1 year.  Patient has had about 3 injections into his left knee he states.  He is status post right total knee arthroplasty by Dr. Quezada and does not want to have a knee replacement on his right knee if he can help it.  Patient is doing exercises for his right knee that he learned from his left knee.    Physical Exam:  Vitals: /80   Ht 1.861 m (6' 1.25\")   Wt 128.3 kg (282 lb 14.4 oz)   BMI 37.07 kg/m    BMI= Body mass index is 37.07 kg/m .  Constitutional: healthy, alert and no acute distress   Psychiatric: mentation appears normal and affect normal/bright  NEURO: no focal deficits, CMS intact right lower extremity   RESP: Normal with easy respirations and no use of accessory muscles to breathe, no audible wheezing or retractions  CV: Calf soft and nontender to palpation, leg warm   SKIN: No erythema, rashes, excoriation, or breakdown. No evidence of infection.   MUSCULOSKELETAL:    INSPECTION of R knee: No gross deformities, erythema, edema, ecchymosis, atrophy or fasciculations.     PALPATION: No tenderness on palpation of the medial, lateral, anterior and posterior portion of the knee. No specific joint line tenderness. No increased warmth.  No effusion.     ROM: Able to flex and extend without any catching or locking or pain.    STRENGTH: Able to get on the exam table and off without any problems and able " to ambulate without any problems.    SPECIAL TEST: None today.   GAIT: non-antalgic  Lymph: no palpable lymph nodes    Diagnostic Modalities:  X-rays done today 3 views of the right knee showing very slight chondrocalcinosis of the medial compartment on the medial side.  Also mild joint narrowing on the medial compartment.  No fracture no dislocation no tumor and good alignment and adequate joint spacing.  Osteophyte noted on the patella.    Independent visualization of the images was performed.      Impression: 1.  Primary osteoarthritis right knee, mild medial.    Plan:    Large Joint Injection/Arthocentesis: R knee joint    Date/Time: 5/4/2022 2:17 PM  Performed by: Bryn Arzate PA-C  Authorized by: Bryn Arzate PA-C     Indications:  Pain  Needle Size:  22 G  Guidance: landmark guided    Approach:  Anterolateral  Location:  Knee      Medications:  80 mg triamcinolone 40 MG/ML; 3 mL bupivacaine 0.5 %; 12 mg betamethasone acet & sod phos 6 (3-3) MG/ML  Aspirate amount (mL):  0  Procedure discussed: discussed risks, benefits, and alternatives    Consent Given by:  Patient  Timeout: timeout called immediately prior to procedure    Prep: patient was prepped and draped in usual sterile fashion     The skin was prepped with betadine. The patient was in a seated position. I used dang chloride spray prior to doing the injection.  The patient tolerated the injection well, and there were no complications. The injection site was covered with a Band-Aid.           FOCUSED PLAN:    Patient receives about 1 year of relief from his steroid injections.  His last one was summer 2021.  He states this 1 has worn off and he would like another one.  I am okay with this.  Patient also does home exercise program on his right knee and takes ibuprofen as needed.  We also discussed possible Mobic in the future.  We discussed his x-rays also.  Follow-up on an as-needed basis.    Re-x-ray on return: No      This note was dictated with  [facility].    Bryn Arzate PA-C        Again, thank you for allowing me to participate in the care of your patient.        Sincerely,        Bryn Arzate PA-C

## 2022-05-04 NOTE — PROGRESS NOTES
"Office Visit-Follow up    Chief Complaint: Amol Peres is a 56 year old male who is being seen for   Chief Complaint   Patient presents with     RECHECK     Right knee primary osteoarthritis       History of Present Illness:   Patient is here in follow-up steroid injection.  Patient's last steroid injection was the summer 2021.  This lasted about 1 year.  Patient has had about 3 injections into his left knee he states.  He is status post right total knee arthroplasty by Dr. Quezada and does not want to have a knee replacement on his right knee if he can help it.  Patient is doing exercises for his right knee that he learned from his left knee.    Physical Exam:  Vitals: /80   Ht 1.861 m (6' 1.25\")   Wt 128.3 kg (282 lb 14.4 oz)   BMI 37.07 kg/m    BMI= Body mass index is 37.07 kg/m .  Constitutional: healthy, alert and no acute distress   Psychiatric: mentation appears normal and affect normal/bright  NEURO: no focal deficits, CMS intact right lower extremity   RESP: Normal with easy respirations and no use of accessory muscles to breathe, no audible wheezing or retractions  CV: Calf soft and nontender to palpation, leg warm   SKIN: No erythema, rashes, excoriation, or breakdown. No evidence of infection.   MUSCULOSKELETAL:    INSPECTION of R knee: No gross deformities, erythema, edema, ecchymosis, atrophy or fasciculations.     PALPATION: No tenderness on palpation of the medial, lateral, anterior and posterior portion of the knee. No specific joint line tenderness. No increased warmth.  No effusion.     ROM: Able to flex and extend without any catching or locking or pain.    STRENGTH: Able to get on the exam table and off without any problems and able to ambulate without any problems.    SPECIAL TEST: None today.   GAIT: non-antalgic  Lymph: no palpable lymph nodes    Diagnostic Modalities:  X-rays done today 3 views of the right knee showing very slight chondrocalcinosis of the medial compartment on " the medial side.  Also mild joint narrowing on the medial compartment.  No fracture no dislocation no tumor and good alignment and adequate joint spacing.  Osteophyte noted on the patella.    Independent visualization of the images was performed.      Impression: 1.  Primary osteoarthritis right knee, mild medial.    Plan:    Large Joint Injection/Arthocentesis: R knee joint    Date/Time: 5/4/2022 2:17 PM  Performed by: Bryn Arzate PA-C  Authorized by: Bryn Arzate PA-C     Indications:  Pain  Needle Size:  22 G  Guidance: landmark guided    Approach:  Anterolateral  Location:  Knee      Medications:  80 mg triamcinolone 40 MG/ML; 3 mL bupivacaine 0.5 %; 12 mg betamethasone acet & sod phos 6 (3-3) MG/ML  Aspirate amount (mL):  0  Procedure discussed: discussed risks, benefits, and alternatives    Consent Given by:  Patient  Timeout: timeout called immediately prior to procedure    Prep: patient was prepped and draped in usual sterile fashion     The skin was prepped with betadine. The patient was in a seated position. I used dang chloride spray prior to doing the injection.  The patient tolerated the injection well, and there were no complications. The injection site was covered with a Band-Aid.           FOCUSED PLAN:    Patient receives about 1 year of relief from his steroid injections.  His last one was summer 2021.  He states this 1 has worn off and he would like another one.  I am okay with this.  Patient also does home exercise program on his right knee and takes ibuprofen as needed.  We also discussed possible Mobic in the future.  We discussed his x-rays also.  Follow-up on an as-needed basis.    Re-x-ray on return: No      This note was dictated with [employer].    Bryn Arzate PA-C

## 2022-05-11 DIAGNOSIS — M25.562 CHRONIC PAIN OF LEFT KNEE: ICD-10-CM

## 2022-05-11 DIAGNOSIS — M22.42 CHONDROMALACIA PATELLAE OF LEFT KNEE: ICD-10-CM

## 2022-05-11 DIAGNOSIS — G89.29 CHRONIC PAIN OF LEFT KNEE: ICD-10-CM

## 2022-05-12 RX ORDER — IBUPROFEN 800 MG/1
800 TABLET, FILM COATED ORAL EVERY 8 HOURS PRN
Qty: 270 TABLET | Refills: 0 | Status: SHIPPED | OUTPATIENT
Start: 2022-05-12 | End: 2022-12-30

## 2022-06-05 ENCOUNTER — HEALTH MAINTENANCE LETTER (OUTPATIENT)
Age: 57
End: 2022-06-05

## 2022-10-15 ENCOUNTER — HEALTH MAINTENANCE LETTER (OUTPATIENT)
Age: 57
End: 2022-10-15

## 2022-12-30 DIAGNOSIS — G89.29 CHRONIC PAIN OF LEFT KNEE: ICD-10-CM

## 2022-12-30 DIAGNOSIS — M25.562 CHRONIC PAIN OF LEFT KNEE: ICD-10-CM

## 2022-12-30 DIAGNOSIS — M22.42 CHONDROMALACIA PATELLAE OF LEFT KNEE: ICD-10-CM

## 2022-12-30 RX ORDER — IBUPROFEN 800 MG/1
800 TABLET, FILM COATED ORAL EVERY 8 HOURS PRN
Qty: 90 TABLET | Refills: 0 | Status: SHIPPED | OUTPATIENT
Start: 2022-12-30

## 2023-06-11 ENCOUNTER — HEALTH MAINTENANCE LETTER (OUTPATIENT)
Age: 58
End: 2023-06-11

## 2024-04-22 DIAGNOSIS — M25.562 CHRONIC PAIN OF LEFT KNEE: ICD-10-CM

## 2024-04-22 DIAGNOSIS — G89.29 CHRONIC PAIN OF LEFT KNEE: ICD-10-CM

## 2024-04-22 DIAGNOSIS — M22.42 CHONDROMALACIA PATELLAE OF LEFT KNEE: ICD-10-CM

## 2024-04-23 RX ORDER — IBUPROFEN 800 MG/1
800 TABLET, FILM COATED ORAL EVERY 8 HOURS PRN
Qty: 90 TABLET | Refills: 0 | OUTPATIENT
Start: 2024-04-23

## 2024-08-04 ENCOUNTER — HEALTH MAINTENANCE LETTER (OUTPATIENT)
Age: 59
End: 2024-08-04

## 2025-04-08 ENCOUNTER — OFFICE VISIT (OUTPATIENT)
Dept: FAMILY MEDICINE | Facility: CLINIC | Age: 60
End: 2025-04-08
Payer: COMMERCIAL

## 2025-04-08 VITALS
TEMPERATURE: 98.4 F | RESPIRATION RATE: 13 BRPM | HEART RATE: 85 BPM | WEIGHT: 292.2 LBS | BODY MASS INDEX: 38.73 KG/M2 | OXYGEN SATURATION: 98 % | HEIGHT: 73 IN | SYSTOLIC BLOOD PRESSURE: 145 MMHG | DIASTOLIC BLOOD PRESSURE: 83 MMHG

## 2025-04-08 DIAGNOSIS — M22.42 CHONDROMALACIA PATELLAE OF LEFT KNEE: ICD-10-CM

## 2025-04-08 DIAGNOSIS — A63.0 GENITAL WARTS: ICD-10-CM

## 2025-04-08 DIAGNOSIS — L91.8 SKIN TAG: Primary | ICD-10-CM

## 2025-04-08 DIAGNOSIS — G89.29 CHRONIC PAIN OF LEFT KNEE: ICD-10-CM

## 2025-04-08 DIAGNOSIS — M25.562 CHRONIC PAIN OF LEFT KNEE: ICD-10-CM

## 2025-04-08 DIAGNOSIS — L98.9 SKIN LESION: ICD-10-CM

## 2025-04-08 PROCEDURE — 99213 OFFICE O/P EST LOW 20 MIN: CPT | Mod: 25

## 2025-04-08 PROCEDURE — 3077F SYST BP >= 140 MM HG: CPT

## 2025-04-08 PROCEDURE — 11200 RMVL SKIN TAGS UP TO&INC 15: CPT

## 2025-04-08 PROCEDURE — 3079F DIAST BP 80-89 MM HG: CPT

## 2025-04-08 PROCEDURE — 1126F AMNT PAIN NOTED NONE PRSNT: CPT

## 2025-04-08 RX ORDER — IBUPROFEN 800 MG/1
800 TABLET, FILM COATED ORAL EVERY 8 HOURS PRN
Qty: 90 TABLET | Refills: 0 | Status: SHIPPED | OUTPATIENT
Start: 2025-04-08

## 2025-04-08 ASSESSMENT — PAIN SCALES - GENERAL: PAINLEVEL_OUTOF10: NO PAIN (0)

## 2025-04-08 NOTE — PROGRESS NOTES
"  Assessment & Plan     Chronic pain of left knee  - ibuprofen (ADVIL/MOTRIN) 800 MG tablet; Take 1 tablet (800 mg) by mouth every 8 hours as needed for moderate pain.    Chondromalacia patellae of left knee  - ibuprofen (ADVIL/MOTRIN) 800 MG tablet; Take 1 tablet (800 mg) by mouth every 8 hours as needed for moderate pain.    Skin lesion  - Adult Dermatology  Referral; Future    Skin tag  - REMOVAL OF SKIN TAGS, FIRST 15    Genital warts          Nicotine/Tobacco Cessation  He reports that he has been smoking cigarettes. He has been exposed to tobacco smoke. He has never used smokeless tobacco.  Nicotine/Tobacco Cessation Plan        BMI  Estimated body mass index is 38.27 kg/m  as calculated from the following:    Height as of this encounter: 1.861 m (6' 1.27\").    Weight as of this encounter: 132.5 kg (292 lb 3.2 oz).         See Patient Instructions  Patient Instructions   ASSESSMENT & PLAN  Skin Tags  - Multiple skin tags present in the groin, axillary area, and on the face. The skin tags in the axillary area were removed during the visit. The skin tag on the eyelid requires a dermatology referral.  - Removal of axillary skin tags performed. Referral to dermatology for removal of the skin tag on the eyelid. Patient consented to the cutting method for removal.    Genital Warts  - Possible genital warts identified in the groin area, requiring further evaluation and treatment by a dermatologist.  - Referral to dermatology for surgical removal of genital warts.    Ibuprofen Refill  - Patient has been taking ibuprofen 800 mg daily for heel pain for about a year and a half. Refill was previously denied due to the need for an appointment.  - [facility]        Follow up with dermatology    Skin tag removal today    Clean with soap and water, watch for signs of infection    Discussed genital warts      Milan Carmichael is a 59 year old, presenting for the " following health issues:  Skin Tags      4/8/2025    12:33 PM   Additional Questions   Roomed by Isabel         4/8/2025    12:33 PM   Patient Reported Additional Medications   Patient reports taking the following new medications none     History of Present Illness       Reason for visit:  Need to have some skin tags removal   He is taking medications regularly.      Patient would like skin tags removed from his left eye lid, right under arm, and his upper thighs near his groin.   SUBJECTIVE    The patient is a 59-year-old male who presents for the removal of multiple skin tags. He reports having skin tags in the groin, axillary area, and on his face, including one on his eyelid. The skin tags cause discomfort, particularly when he is driving for extended periods, such as three to three and a half hours. He also feels embarrassed by the skin tags under his arms during the summer when he wears sleeveless shirts.    He has been taking ibuprofen 800 mg daily for the past year to year and a half to manage heel pain. He mentions a prescription refill issue, where he was informed that he needed to give blood before the prescription could be refilled. He does not take any blood thinners, including aspirin or ibuprofen, other than the prescribed ibuprofen for his heel.    The patient has a history of knee replacement surgery and recalls that the first two weeks post-surgery were manageable, but he experienced significant discomfort when the nerve or muscle became active, particularly at night. This discomfort lasted for about two weeks. He has become accustomed to medical procedures over the years due to frequent hospital visits and procedures.  OBJECTIVE    Physical exam:    - Multiple skin tags in the groin and axillary area    - Skin tag on the eyelid    Procedure:  Patient was in the supine position on the exam bed.  The right axilla was cleansed with iodine swabs in a circular fashion.  1% lidocaine with epinephrine  "was injected into the base of the skin tags and anesthesia was achieved.  A sterile tweezers and scissors were used to snip off the skin tags, offered sending to pathology and patient declined.  The skin tags were discarded.                  Objective    BP (!) 145/83 (BP Location: Right arm, Patient Position: Sitting, Cuff Size: Adult Large)   Pulse 85   Temp 98.4  F (36.9  C) (Temporal)   Resp 13   Ht 1.861 m (6' 1.27\")   Wt 132.5 kg (292 lb 3.2 oz)   SpO2 98%   BMI 38.27 kg/m    Body mass index is 38.27 kg/m .  Physical Exam     Skin: Skin tag noted over the left upper eyelid.  2 pea-sized skin tags in the right axilla that were removed today multiple cauliflower flesh colored papules in the groin region consistent with genital warts          Signed Electronically by: Ngozi Downey PA-C    "

## 2025-04-08 NOTE — PATIENT INSTRUCTIONS
ASSESSMENT & PLAN  Skin Tags  - Multiple skin tags present in the groin, axillary area, and on the face. The skin tags in the axillary area were removed during the visit. The skin tag on the eyelid requires a dermatology referral.  - Removal of axillary skin tags performed. Referral to dermatology for removal of the skin tag on the eyelid. Patient consented to the cutting method for removal.    Genital Warts  - Possible genital warts identified in the groin area, requiring further evaluation and treatment by a dermatologist.  - Referral to dermatology for surgical removal of genital warts.    Ibuprofen Refill  - Patient has been taking ibuprofen 800 mg daily for heel pain for about a year and a half. Refill was previously denied due to the need for an appointment.  - Refill of ibuprofen 800 mg sent to [facility] pharmacy.        Follow up with dermatology    Skin tag removal today    Clean with soap and water, watch for signs of infection    Discussed genital warts

## 2025-04-15 ENCOUNTER — TRANSFERRED RECORDS (OUTPATIENT)
Dept: HEALTH INFORMATION MANAGEMENT | Facility: CLINIC | Age: 60
End: 2025-04-15
Payer: COMMERCIAL

## 2025-06-02 DIAGNOSIS — M25.561 RIGHT KNEE PAIN: Primary | ICD-10-CM

## 2025-06-03 ASSESSMENT — KOOS JR
HOW SEVERE IS YOUR KNEE STIFFNESS AFTER FIRST WAKING IN MORNING: MILD
TWISING OR PIVOTING ON KNEE: SEVERE
GOING UP OR DOWN STAIRS: SEVERE
BENDING TO THE FLOOR TO PICK UP OBJECT: EXTREME
STRAIGHTENING KNEE FULLY: MODERATE
RISING FROM SITTING: MODERATE
STANDING UPRIGHT: SEVERE
KOOS JR SCORING: 42.28

## 2025-06-03 NOTE — PROGRESS NOTES
ORTHOPEDIC CONSULT      Chief Complaint: Amol Peres is a 60 year old male who is being seen for   Chief Complaint   Patient presents with    Right Knee - Pain       History of Present Illness:   Today's visit:  Here for the right knee. Reports knee flared up about 1.5 weeks ago. Similar pain to previous but more intense. Worse with weight bearing, getting up after sitting, and with walking/standing.  Mostly medial but can be global with some radiation along the tibia. No back pain. No numbness/tingling. Reports has had previous steroid injections, last in 2022 by Bryn Arzate PA-C with good relief.  No previous surgeries. The pain has been off and on for a few years.     June 24, 2021 office visit:  Returns to clinic for the left knee. States knee was doing very well up until about 1 to 1.5 months ago. He had not been playing any golf but then at that same time he played 18 holes. No specific injuries or issues that day The next day while mowing start having severer pain to the left knee. Described as 2/10 at rest, 7/10 with weight bearing. Distal medial knee pain that can radiate around the knee and down into the foot at times. No numbness/tingling.   Taking 800mg ibuprofen at night occasionally which does help. No fevers, no trauma, no systemic symptoms. No open wounds or erythema to the knee. Thinks some swelling.       April 5, 2021 office visit:  Overall progressing.  He had some recent small setback with a twisting injury.  Cause pain in the knee for about 1/2-hour then resolved.  Feels like is making progress.     His contralateral knee has progressively worsened.  He would like an injection.  No specific injuries or traumas.      January 5, 2021: Left total knee arthroplasty      Patient's past medical, surgical, social and family histories reviewed.     Past Medical History:   Diagnosis Date    Chondromalacia patellae of left knee 11/28/2014    CTS (carpal tunnel syndrome) 11/28/2014    right > left      GERD (gastroesophageal reflux disease)     Hiatal hernia 2008    Hypertension 3/26/2010    Mild airflow obstruction on pulmonary function test 4/6/2016    Morbid obesity with body mass index of 40.0-44.9 in adult (H) 3/16/2016    Tobacco use disorder 3/16/2016       Past Surgical History:   Procedure Laterality Date    APPENDECTOMY  04/04/10    ARTHROPLASTY KNEE Left 1/12/2021    Procedure: left total knee replacement;  Surgeon: Joel Quezada DO;  Location: [facility]    COLONOSCOPY N/A 10/16/2015    Procedure: COMBINED COLONOSCOPY, SINGLE OR MULTIPLE BIOPSY/POLYPECTOMY BY BIOPSY;  Surgeon: Marcial Colvin MD;  Location:  GI    ESOPHAGOSCOPY, GASTROSCOPY, DUODENOSCOPY (EGD), COMBINED  12/27/2010    COMBINED ESOPHAGOSCOPY, GASTROSCOPY, DUODENOSCOPY (EGD), BIOPSY SINGLE OR MULTIPLE performed by PATRICE MORGAN at  GI    ESOPHAGOSCOPY, GASTROSCOPY, DUODENOSCOPY (EGD), COMBINED  12/26/2012    Procedure: COMBINED ESOPHAGOSCOPY, GASTROSCOPY, DUODENOSCOPY (EGD), BIOPSY SINGLE OR MULTIPLE;  ESOPHAGOSCOPY, GASTROSCOPY, DUODENOSCOPY WITH  SINGLE BIOPSY;  Surgeon: Patrice Morgan MD;  Location:  GI    ESOPHAGOSCOPY, GASTROSCOPY, DUODENOSCOPY (EGD), COMBINED N/A 10/16/2015    Procedure: COMBINED ESOPHAGOSCOPY, GASTROSCOPY, DUODENOSCOPY (EGD), BIOPSY SINGLE OR MULTIPLE;  Surgeon: Marcial Colvin MD;  Location:  GI    ESOPHAGOSCOPY, GASTROSCOPY, DUODENOSCOPY (EGD), COMBINED N/A 9/1/2017    Procedure: COMBINED ESOPHAGOSCOPY, GASTROSCOPY, DUODENOSCOPY (EGD), BIOPSY SINGLE OR MULTIPLE;  Esophagogastroduodenoscopy, Biopsy by Biopsy;  Surgeon: Kevin Fung MD;  Location: [facility] UGI ENDOSCOPY DIAG W BIOPSY  8/28/2009    [facility] UGI ENDOSCOPY, SIMPLE EXAM  09/23/08       Medications:  Current Outpatient Medications   Medication Sig Dispense Refill    ibuprofen (ADVIL/MOTRIN) 800 MG tablet Take 1 tablet (800 mg) by mouth every 8 hours as needed for moderate pain. 90 tablet 0     No current  facility-administered medications for this visit.       Allergies   Allergen Reactions    No Known Drug Allergy        Social History     Occupational History    Not on file   Tobacco Use    Smoking status: Every Day     Current packs/day: 1.00     Types: Cigarettes     Passive exposure: Current    Smokeless tobacco: Never   Vaping Use    Vaping status: Never Used   Substance and Sexual Activity    Alcohol use: Yes     Comment: occasionally    Drug use: No    Sexual activity: Yes     Partners: Female       Family History   Problem Relation Age of Onset    Cancer Mother         Cervical    Cancer Maternal Grandfather     Cancer Paternal Grandmother        REVIEW OF SYSTEMS  10 point review systems performed otherwise negative as noted as per history of present illness.    Physical Exam:  Vitals: Wt 124.1 kg (273 lb 9.6 oz)   BMI 35.83 kg/m    BMI= Body mass index is 35.83 kg/m .  Constitutional: healthy, alert and no acute distress   Psychiatric: mentation appears normal and affect normal/bright  NEURO: no focal deficits  RESP: Normal with easy respirations and no use of accessory muscles to breathe, no audible wheezing or retractions  CV: No peripheral edema         Regular rate and rhythm by palpation  SKIN: No erythema, rashes, excoriation, or breakdown. No evidence of infection.   JOINT/EXTREMITIES:right knee. No effusion. AROM 0-115. Pain at 115 degrees flexion. No pain with extension. No valgus or varus instability. Medial joint line tenderness. Extensor intact. Patella tracks midline. Negative phil. Calf soft non-tender.      GAIT: not tested     Diagnostic Modalities:  Right knee x-rays: Weightbearing views taken in clinic shows no acute fractures or dislocations.  Mild medial compartment narrowing but overall preserved knee.  No significant malalignment  Independent visualization of the images was performed.      Impression: right chronic knee pain questionable early arthritis.     Plan:  All of the  above pertinent physical exam and imaging modalities findings was reviewed with Amol.  Has had this pain for many years off and on. Recently flared up. Would like injection.  Given the relatively well preserved joint space would recommend MRI as the next step if the injection fails to provide relief.                                           INJECTION PROCEDURE:  The patient was counseled about an  injection, including discussion of risks (including infection), contents of the injection, rationale for performing the injection, and expected benefits of the injection. The skin was prepped with alcohol and betadine and then utilizing sterile technique an injection of the right knee joint from the anterolateral approach in the seated position was performed. The injection consisted 1ml of Kenalog (40mg per 1 ml) mixed with 3ml of 0.5% Marcaine. The patient tolerated the injection well, and there were no complications. The injection site was covered with a Band-Aid. The injection was performed by Sorin Bob, APRN, CNP, DNP        Return to clinic: call if injection not helping or short lived, PRN , or sooner as needed for changes.  Re-x-ray on return: No    Dr Quezada

## 2025-06-04 ENCOUNTER — ANCILLARY PROCEDURE (OUTPATIENT)
Dept: GENERAL RADIOLOGY | Facility: CLINIC | Age: 60
End: 2025-06-04
Attending: NURSE PRACTITIONER
Payer: COMMERCIAL

## 2025-06-04 ENCOUNTER — OFFICE VISIT (OUTPATIENT)
Dept: ORTHOPEDICS | Facility: CLINIC | Age: 60
End: 2025-06-04
Payer: COMMERCIAL

## 2025-06-04 VITALS — BODY MASS INDEX: 35.83 KG/M2 | WEIGHT: 273.6 LBS

## 2025-06-04 DIAGNOSIS — G89.29 CHRONIC PAIN OF RIGHT KNEE: Primary | ICD-10-CM

## 2025-06-04 DIAGNOSIS — M25.561 CHRONIC PAIN OF RIGHT KNEE: Primary | ICD-10-CM

## 2025-06-04 DIAGNOSIS — M25.561 RIGHT KNEE PAIN: ICD-10-CM

## 2025-06-04 PROCEDURE — 99203 OFFICE O/P NEW LOW 30 MIN: CPT | Mod: 25 | Performed by: ORTHOPAEDIC SURGERY

## 2025-06-04 PROCEDURE — 73564 X-RAY EXAM KNEE 4 OR MORE: CPT | Mod: TC | Performed by: STUDENT IN AN ORGANIZED HEALTH CARE EDUCATION/TRAINING PROGRAM

## 2025-06-04 PROCEDURE — 20610 DRAIN/INJ JOINT/BURSA W/O US: CPT | Mod: RT | Performed by: ORTHOPAEDIC SURGERY

## 2025-06-04 RX ORDER — BUPIVACAINE HYDROCHLORIDE 2.5 MG/ML
3 INJECTION, SOLUTION INFILTRATION; PERINEURAL
Status: COMPLETED | OUTPATIENT
Start: 2025-06-04 | End: 2025-06-04

## 2025-06-04 RX ORDER — TRIAMCINOLONE ACETONIDE 40 MG/ML
40 INJECTION, SUSPENSION INTRA-ARTICULAR; INTRAMUSCULAR
Status: COMPLETED | OUTPATIENT
Start: 2025-06-04 | End: 2025-06-04

## 2025-06-04 RX ADMIN — TRIAMCINOLONE ACETONIDE 40 MG: 40 INJECTION, SUSPENSION INTRA-ARTICULAR; INTRAMUSCULAR at 11:55

## 2025-06-04 RX ADMIN — BUPIVACAINE HYDROCHLORIDE 3 ML: 2.5 INJECTION, SOLUTION INFILTRATION; PERINEURAL at 11:55

## 2025-06-04 NOTE — PROGRESS NOTES
Large Joint Injection/Arthocentesis: R knee joint    Date/Time: 6/4/2025 11:55 AM    Performed by: Jessee Bob APRN CNP  Authorized by: Joel Quezada DO    Indications:  Pain  Needle Size:  25 G  Guidance: landmark guided    Approach:  Anterolateral  Location:  Knee      Medications:  40 mg triamcinolone 40 MG/ML; 3 mL BUPivacaine 0.25 %  Outcome:  Tolerated well, no immediate complications  Procedure discussed: discussed risks, benefits, and alternatives    Consent Given by:  Patient  Timeout: timeout called immediately prior to procedure    Prep: patient was prepped and draped in usual sterile fashion

## 2025-06-04 NOTE — LETTER
6/4/2025      Amol Peres  [address]  [place] [address]      Dear Colleague,    Thank you for referring your patient, Amol Peres, to the [facility]. Please see a copy of my visit note below.    ORTHOPEDIC CONSULT      Chief Complaint: Amol Peres is a 60 year old male who is being seen for   Chief Complaint   Patient presents with     Right Knee - Pain       History of Present Illness:   Today's visit:  Here for the right knee. Reports knee flared up about 1.5 weeks ago. Similar pain to previous but more intense. Worse with weight bearing, getting up after sitting, and with walking/standing.  Mostly medial but can be global with some radiation along the tibia. No back pain. No numbness/tingling. Reports has had previous steroid injections, last in 2022 by Bryn Arzate PA-C with good relief.  No previous surgeries. The pain has been off and on for a few years.     June 24, 2021 office visit:  Returns to clinic for the left knee. States knee was doing very well up until about 1 to 1.5 months ago. He had not been playing any golf but then at that same time he played 18 holes. No specific injuries or issues that day The next day while mowing start having severer pain to the left knee. Described as 2/10 at rest, 7/10 with weight bearing. Distal medial knee pain that can radiate around the knee and down into the foot at times. No numbness/tingling.   Taking 800mg ibuprofen at night occasionally which does help. No fevers, no trauma, no systemic symptoms. No open wounds or erythema to the knee. Thinks some swelling.       April 5, 2021 office visit:  Overall progressing.  He had some recent small setback with a twisting injury.  Cause pain in the knee for about 1/2-hour then resolved.  Feels like is making progress.     His contralateral knee has progressively worsened.  He would like an injection.  No specific injuries or traumas.      January 5, 2021: Left total knee  arthroplasty      Patient's past medical, surgical, social and family histories reviewed.     Past Medical History:   Diagnosis Date     Chondromalacia patellae of left knee 11/28/2014     CTS (carpal tunnel syndrome) 11/28/2014    right > left      GERD (gastroesophageal reflux disease)      Hiatal hernia 2008     Hypertension 3/26/2010     Mild airflow obstruction on pulmonary function test 4/6/2016     Morbid obesity with body mass index of 40.0-44.9 in adult (H) 3/16/2016     Tobacco use disorder 3/16/2016       Past Surgical History:   Procedure Laterality Date     APPENDECTOMY  04/04/10     ARTHROPLASTY KNEE Left 1/12/2021    Procedure: left total knee replacement;  Surgeon: Joel Quezada DO;  Location: [facility]     COLONOSCOPY N/A 10/16/2015    Procedure: COMBINED COLONOSCOPY, SINGLE OR MULTIPLE BIOPSY/POLYPECTOMY BY BIOPSY;  Surgeon: Marcial oClvin MD;  Location:  GI     ESOPHAGOSCOPY, GASTROSCOPY, DUODENOSCOPY (EGD), COMBINED  12/27/2010    COMBINED ESOPHAGOSCOPY, GASTROSCOPY, DUODENOSCOPY (EGD), BIOPSY SINGLE OR MULTIPLE performed by PATRICE MORGAN at  GI     ESOPHAGOSCOPY, GASTROSCOPY, DUODENOSCOPY (EGD), COMBINED  12/26/2012    Procedure: COMBINED ESOPHAGOSCOPY, GASTROSCOPY, DUODENOSCOPY (EGD), BIOPSY SINGLE OR MULTIPLE;  ESOPHAGOSCOPY, GASTROSCOPY, DUODENOSCOPY WITH  SINGLE BIOPSY;  Surgeon: Patrice Morgan MD;  Location:  GI     ESOPHAGOSCOPY, GASTROSCOPY, DUODENOSCOPY (EGD), COMBINED N/A 10/16/2015    Procedure: COMBINED ESOPHAGOSCOPY, GASTROSCOPY, DUODENOSCOPY (EGD), BIOPSY SINGLE OR MULTIPLE;  Surgeon: Marcial Colvin MD;  Location:  GI     ESOPHAGOSCOPY, GASTROSCOPY, DUODENOSCOPY (EGD), COMBINED N/A 9/1/2017    Procedure: COMBINED ESOPHAGOSCOPY, GASTROSCOPY, DUODENOSCOPY (EGD), BIOPSY SINGLE OR MULTIPLE;  Esophagogastroduodenoscopy, Biopsy by Biopsy;  Surgeon: Kevin Fung MD;  Location:  GI      UGI ENDOSCOPY DIAG W BIOPSY  8/28/2009     [facility] UGI  ENDOSCOPY, SIMPLE EXAM  09/23/08       Medications:  Current Outpatient Medications   Medication Sig Dispense Refill     ibuprofen (ADVIL/MOTRIN) 800 MG tablet Take 1 tablet (800 mg) by mouth every 8 hours as needed for moderate pain. 90 tablet 0     No current facility-administered medications for this visit.       Allergies   Allergen Reactions     No Known Drug Allergy        Social History     Occupational History     Not on file   Tobacco Use     Smoking status: Every Day     Current packs/day: 1.00     Types: Cigarettes     Passive exposure: Current     Smokeless tobacco: Never   Vaping Use     Vaping status: Never Used   Substance and Sexual Activity     Alcohol use: Yes     Comment: occasionally     Drug use: No     Sexual activity: Yes     Partners: Female       Family History   Problem Relation Age of Onset     Cancer Mother         Cervical     Cancer Maternal Grandfather      Cancer Paternal Grandmother        REVIEW OF SYSTEMS  10 point review systems performed otherwise negative as noted as per history of present illness.    Physical Exam:  Vitals: Wt 124.1 kg (273 lb 9.6 oz)   BMI 35.83 kg/m    BMI= Body mass index is 35.83 kg/m .  Constitutional: healthy, alert and no acute distress   Psychiatric: mentation appears normal and affect normal/bright  NEURO: no focal deficits  RESP: Normal with easy respirations and no use of accessory muscles to breathe, no audible wheezing or retractions  CV: No peripheral edema         Regular rate and rhythm by palpation  SKIN: No erythema, rashes, excoriation, or breakdown. No evidence of infection.   JOINT/EXTREMITIES:right knee. No effusion. AROM 0-115. Pain at 115 degrees flexion. No pain with extension. No valgus or varus instability. Medial joint line tenderness. Extensor intact. Patella tracks midline. Negative phil. Calf soft non-tender.      GAIT: not tested     Diagnostic Modalities:  Right knee x-rays: Weightbearing views taken in clinic shows no  acute fractures or dislocations.  Mild medial compartment narrowing but overall preserved knee.  No significant malalignment  Independent visualization of the images was performed.      Impression: right chronic knee pain questionable early arthritis.     Plan:  All of the above pertinent physical exam and imaging modalities findings was reviewed with Amol.  Has had this pain for many years off and on. Recently flared up. Would like injection.  Given the relatively well preserved joint space would recommend MRI as the next step if the injection fails to provide relief.                                           INJECTION PROCEDURE:  The patient was counseled about an  injection, including discussion of risks (including infection), contents of the injection, rationale for performing the injection, and expected benefits of the injection. The skin was prepped with alcohol and betadine and then utilizing sterile technique an injection of the right knee joint from the anterolateral approach in the seated position was performed. The injection consisted 1ml of Kenalog (40mg per 1 ml) mixed with 3ml of 0.5% Marcaine. The patient tolerated the injection well, and there were no complications. The injection site was covered with a Band-Aid. The injection was performed by DARLEEN Hoyos, CNP, DNP        Return to clinic: call if injection not helping or short lived, PRN , or sooner as needed for changes.  Re-x-ray on return: No    Dr Quezada      Large Joint Injection/Arthocentesis: R knee joint    Date/Time: 6/4/2025 11:55 AM    Performed by: Jessee Bob APRN CNP  Authorized by: Joel Quezada DO    Indications:  Pain  Needle Size:  25 G  Guidance: landmark guided    Approach:  Anterolateral  Location:  Knee      Medications:  40 mg triamcinolone 40 MG/ML; 3 mL BUPivacaine 0.25 %  Outcome:  Tolerated well, no immediate complications  Procedure discussed: discussed risks, benefits, and alternatives     Consent Given by:  Patient  Timeout: timeout called immediately prior to procedure    Prep: patient was prepped and draped in usual sterile fashion         Again, thank you for allowing me to participate in the care of your patient.        Sincerely,        Joel Quezada, DO    Electronically signed

## 2025-07-16 DIAGNOSIS — M25.562 CHRONIC PAIN OF LEFT KNEE: ICD-10-CM

## 2025-07-16 DIAGNOSIS — G89.29 CHRONIC PAIN OF LEFT KNEE: ICD-10-CM

## 2025-07-16 DIAGNOSIS — M22.42 CHONDROMALACIA PATELLAE OF LEFT KNEE: ICD-10-CM

## 2025-07-16 NOTE — PROGRESS NOTES
In an effort to ensure that our patients [employer], a Team Member has reviewed your chart and identified an opportunity to provide the best care possible. An attempt was made to discuss or schedule due or overdue Preventive or Chronic Condition care.Care Gaps identified: Colorectal Cancer Screening.    The Outcome was Contact was made, care gap was discussed - see further documentation.   Type of Appointment needed: Primary Care Visit    Called pt's daughter, pt's daughter declines at this time.      [facility]  [address]  [place] [address]  Phone: [phone]  Primary Provider: Miguel Roman  Pre-op Performing Provider: PARAG RAMESH    PREOPERATIVE EVALUATION:  Today's date: 11/6/2020    Amol Peres is a 55 year old male who presents for a preoperative evaluation.    Surgical Information:  Surgery/Procedure: Left knee replacement  Surgery Location: [facility]  Surgeon: Dr. Quezada  Surgery Date: TBD  Time of Surgery: TBD  Where patient plans to recover: At home with family  Fax number for surgical facility: Note does not need to be faxed, will be available electronically in Epic.    Type of Anesthesia Anticipated: to be determined    Subjective     HPI related to upcoming procedure: left knee in consideration of replacement    Preop Questions 11/6/2020   1. Have you ever had a heart attack or stroke? No   2. Have you ever had surgery on your heart or blood vessels, such as a stent placement, a coronary artery bypass, or surgery on an artery in your head, neck, heart, or legs? No   3. Do you have chest pain with activity? No   4. Do you have a history of  heart failure? No   5. Do you currently have a cold, bronchitis or symptoms of other infection? No   6. Do you have a cough, shortness of breath, or wheezing? No   7. Do you or anyone in your family have previous history of blood clots? No   8. Do you or does anyone in your family have a serious bleeding problem such as prolonged bleeding following surgeries or cuts? No   9. Have you ever had problems with anemia or been told to take iron pills? No   10. Have you had any abnormal blood loss such as black, tarry or bloody stools? No   11. Have you ever had a blood transfusion? No   12. Are you willing to have a blood transfusion if it is medically needed before, during, or after your surgery? Yes   13. Have you or any of your relatives ever had problems with anesthesia? No   14. Do you have  sleep apnea, excessive snoring or daytime drowsiness? No   15. Do you have any artifical heart valves or other implanted medical devices like a pacemaker, defibrillator, or continuous glucose monitor? No   16. Do you have artificial joints? No   17. Are you allergic to latex? No       Health Care Directive:  Patient does not have a Health Care Directive or Living Will:     Preoperative Review of :    Review of Systems  CONSTITUTIONAL: NEGATIVE for fever, chills, change in weight  INTEGUMENTARY/SKIN: NEGATIVE for worrisome rashes, moles or lesions  EYES: NEGATIVE for vision changes or irritation  ENT/MOUTH: NEGATIVE for ear, mouth and throat problems  RESP: NEGATIVE for significant cough or SOB  BREAST: NEGATIVE for masses, tenderness or discharge  CV: NEGATIVE for chest pain, palpitations or peripheral edema  GI: NEGATIVE for nausea, abdominal pain, heartburn, or change in bowel habits  : NEGATIVE for frequency, dysuria, or hematuria  MUSCULOSKELETAL: NEGATIVE for significant arthralgias or myalgia  NEURO: NEGATIVE for weakness, dizziness or paresthesias  ENDOCRINE: NEGATIVE for temperature intolerance, skin/hair changes  HEME: NEGATIVE for bleeding problems  PSYCHIATRIC: NEGATIVE for changes in mood or affect    Patient Active Problem List    Diagnosis Date Noted     Chronic pain of left knee 11/04/2020     Priority: Medium     Morbid obesity (H) 02/25/2020     Priority: Medium     Other chest pain 02/25/2020     Priority: Medium     Primary osteoarthritis of left knee 04/18/2019     Priority: Medium     Lipoma of skin and subcutaneous tissue 12/31/2018     Priority: Medium     Mild airflow obstruction on pulmonary function test 04/06/2016     Priority: Medium     Tobacco use disorder 03/16/2016     Priority: Medium     CTS (carpal tunnel syndrome) 11/28/2014     Priority: Medium     right > left       Chondromalacia patellae of left knee 11/28/2014     Priority: Medium     Pain in thoracic spine 09/04/2014      Priority: Medium     Hypertension goal BP (blood pressure) < 140/90 06/25/2013     Priority: Medium     Hyperlipidemia LDL goal <130 10/31/2010     Priority: Medium      Past Medical History:   Diagnosis Date     Chondromalacia patellae of left knee 11/28/2014     CTS (carpal tunnel syndrome) 11/28/2014    right > left      GERD (gastroesophageal reflux disease)      Hiatal hernia 2008     Hypertension 3/26/2010     Mild airflow obstruction on pulmonary function test 4/6/2016     Morbid obesity with body mass index of 40.0-44.9 in adult (H) 3/16/2016     Tobacco use disorder 3/16/2016     Past Surgical History:   Procedure Laterality Date     APPENDECTOMY  04/04/10     COLONOSCOPY N/A 10/16/2015    Procedure: COMBINED COLONOSCOPY, SINGLE OR MULTIPLE BIOPSY/POLYPECTOMY BY BIOPSY;  Surgeon: Marcial Colvin MD;  Location:  GI     ESOPHAGOSCOPY, GASTROSCOPY, DUODENOSCOPY (EGD), COMBINED  12/27/2010    COMBINED ESOPHAGOSCOPY, GASTROSCOPY, DUODENOSCOPY (EGD), BIOPSY SINGLE OR MULTIPLE performed by JUSTINA CAR at  GI     ESOPHAGOSCOPY, GASTROSCOPY, DUODENOSCOPY (EGD), COMBINED  12/26/2012    Procedure: COMBINED ESOPHAGOSCOPY, GASTROSCOPY, DUODENOSCOPY (EGD), BIOPSY SINGLE OR MULTIPLE;  ESOPHAGOSCOPY, GASTROSCOPY, DUODENOSCOPY WITH  SINGLE BIOPSY;  Surgeon: Justina Car MD;  Location:  GI     ESOPHAGOSCOPY, GASTROSCOPY, DUODENOSCOPY (EGD), COMBINED N/A 10/16/2015    Procedure: COMBINED ESOPHAGOSCOPY, GASTROSCOPY, DUODENOSCOPY (EGD), BIOPSY SINGLE OR MULTIPLE;  Surgeon: Marcial Colvin MD;  Location:  GI     ESOPHAGOSCOPY, GASTROSCOPY, DUODENOSCOPY (EGD), COMBINED N/A 9/1/2017    Procedure: COMBINED ESOPHAGOSCOPY, GASTROSCOPY, DUODENOSCOPY (EGD), BIOPSY SINGLE OR MULTIPLE;  Esophagogastroduodenoscopy, Biopsy by Biopsy;  Surgeon: Kevin Fung MD;  Location:  GI     HC UGI ENDOSCOPY DIAG W BIOPSY  8/28/2009     HC UGI ENDOSCOPY, SIMPLE EXAM  09/23/08     Current Outpatient Medications    Medication Sig Dispense Refill     atorvastatin (LIPITOR) 20 MG tablet Take 1 tablet (20 mg) by mouth daily 90 tablet 1     cyclobenzaprine (FLEXERIL) 10 MG tablet Take 1 tablet (10 mg) by mouth 3 times daily as needed for muscle spasms 30 tablet 0     diclofenac (VOLTAREN) 75 MG EC tablet Take 1 tablet (75 mg) by mouth 2 times daily (Patient not taking: Reported on 11/4/2020) 28 tablet 0     ibuprofen (ADVIL/MOTRIN) 800 MG tablet TAKE ONE TABLET BY MOUTH THREE TIMES A  tablet 3     naproxen (NAPROSYN) 500 MG tablet Take 1 tablet (500 mg) by mouth 2 times daily (with meals) 60 tablet 0     naproxen (NAPROSYN) 500 MG tablet Take 1 tablet (500 mg) by mouth 2 times daily (with meals) 30 tablet 0     omeprazole (PRILOSEC) 40 MG DR capsule Take 1 capsule (40 mg) by mouth daily 90 capsule 1     sildenafil (VIAGRA) 100 MG tablet Take 0.5-1 tablets ( mg) by mouth daily as needed for erectile dysfunction Take 30 min to 4 hours before intercourse.  Never use with nitroglycerin, terazosin or doxazosin. 12 tablet 0       Allergies   Allergen Reactions     No Known Drug Allergies         Social History     Tobacco Use     Smoking status: Current Every Day Smoker     Packs/day: 1.00     Smokeless tobacco: Never Used   Substance Use Topics     Alcohol use: Yes     Comment: occasionally     Family History   Problem Relation Age of Onset     Cancer Mother         Cervical     Cancer Maternal Grandfather      Cancer Paternal Grandmother      History   Drug Use No         Objective     There were no vitals taken for this visit.    Physical Exam    GENERAL APPEARANCE: healthy, alert and no distress     EYES: EOMI,  PERRL     HENT: ear canals and TM's normal and nose and mouth without ulcers or lesions     NECK: no adenopathy, no asymmetry, masses, or scars and thyroid normal to palpation     RESP: lungs clear to auscultation - no rales, rhonchi or wheezes     CV: regular rates and rhythm, normal S1 S2, no S3 or S4 and no  murmur, click or rub     ABDOMEN:  soft, nontender, no HSM or masses and bowel sounds normal     MS: extremities normal- no gross deformities noted, no evidence of inflammation in joints, FROM in all extremities.     SKIN: no suspicious lesions or rashes     NEURO: Normal strength and tone, sensory exam grossly normal, mentation intact and speech normal     PSYCH: mentation appears normal. and affect normal/bright     LYMPHATICS: No cervical adenopathy    Recent Labs   Lab Test 02/25/20  0927 12/31/18  1117   HGB 15.4  --      --     140   POTASSIUM 4.7 4.2   CR 1.07 1.22        Diagnostics:  Labs pending at this time.  Results will be reviewed when available.   No EKG required, no history of coronary heart disease, significant arrhythmia, peripheral arterial disease or other structural heart disease.    Revised Cardiac Risk Index (RCRI):  The patient has the following serious cardiovascular risks for perioperative complications:   - No serious cardiac risks = 0 points     RCRI Interpretation: 1 point: Class II (low risk - 0.9% complication rate)         Assessment & Plan   The proposed surgical procedure is considered INTERMEDIATE risk.    Amol was seen today for pre-op exam.    Diagnoses and all orders for this visit:    Preop general physical exam  -     Hemoglobin  -     Comprehensive metabolic panel    Chronic pain of left knee  -     Hemoglobin  -     Comprehensive metabolic panel    Mild airflow obstruction on pulmonary function test  -     Hemoglobin  -     Comprehensive metabolic panel    Hypertension goal BP (blood pressure) < 140/90  -     Hemoglobin  -     Comprehensive metabolic panel    Chondromalacia patellae of left knee  -     Hemoglobin  -     Comprehensive metabolic panel    Primary osteoarthritis of left knee  -     Hemoglobin  -     Comprehensive metabolic panel    Tobacco use disorder  -     Hemoglobin  -     Comprehensive metabolic panel      I had a prolonged discussion with the  patient surrounding his knee in possible surgical intervention.  We note that no definite date for his surgery is on the books.  We also know that with current COVID-19 status there are not recovery beds available in the hospital for patients with elective surgery.  I have no idea when his surgery will be so I cannot provide a day where a COVID-19 test for him should be done.  He does not have any signs and symptoms related to COVID-19 today.    Approval given to proceed with proposed procedure, without further diagnostic evaluation.  Patient was well the day of the exam.  Given that his surgery date is unknown and the time of the year, he may very well develop COVID-19 prior to surgery.  Careful reexamination prior to anesthesia is encouraged.    Risks and Recommendations:  The patient has the following additional risks and recommendations for perioperative complications:   - No identified additional risk factors other than previously addressed    Medication Instructions:   - naproxen (Aleve, Naprosyn): HOLD 4 days before surgery.     RECOMMENDATION:  APPROVAL GIVEN to proceed with proposed procedure, without further diagnostic evaluation.    Signed Electronically by: CURTIS Rodríguez PA-C    Copy of this evaluation report is provided to requesting physician.    [facility]    Revised Cardiac Risk Index

## 2025-07-17 RX ORDER — IBUPROFEN 800 MG/1
800 TABLET, FILM COATED ORAL EVERY 8 HOURS PRN
Qty: 90 TABLET | Refills: 0 | OUTPATIENT
Start: 2025-07-17

## 2025-07-17 NOTE — TELEPHONE ENCOUNTER
I have not seen the patient since 2020.  I will not be refilling his medications without an office visit.

## 2025-08-16 ENCOUNTER — HEALTH MAINTENANCE LETTER (OUTPATIENT)
Age: 60
End: 2025-08-16

## 2027-01-25 DIAGNOSIS — Z53.9 DIAGNOSIS NOT YET DEFINED: Primary | ICD-10-CM

## (undated) DEVICE — SOL NACL 0.9% IRRIG 1000ML BOTTLE 07138-09

## (undated) DEVICE — BONE CEMENT MIXING BOWL W/SPATULA

## (undated) DEVICE — GLOVE PROTEXIS W/NEU-THERA 7.0  2D73TE70

## (undated) DEVICE — SOL WATER IRRIG 1000ML BOTTLE 07139-09

## (undated) DEVICE — DRAPE CONVERTORS U-DRAPE 60X72" 8476

## (undated) DEVICE — BNDG ESMARK 6" STERILE

## (undated) DEVICE — BONE CLEANING TIP INTERPULSE  0210-010-000

## (undated) DEVICE — SUCTION IRR SYSTEM W/O TIP INTERPULSE HANDPIECE 0210-100-000

## (undated) DEVICE — GLOVE PROTEXIS W/NEU-THERA 7.5  2D73TE75

## (undated) DEVICE — PEN MARKING SKIN

## (undated) DEVICE — ESU PENCIL SMOKE EVAC W/ROCKER SWITCH 0703-047-000

## (undated) DEVICE — TOURNIQUET CUFF 34"

## (undated) DEVICE — SU VICRYL 0 OS-6 18" UND J754T

## (undated) DEVICE — DRAPE U SPLIT 74X120" 29440

## (undated) DEVICE — CAST PADDING 6" UNSTERILE 9046

## (undated) DEVICE — NDL COUNTER 40CT  31142311

## (undated) DEVICE — PACK TOTAL JOINT STD LATEX

## (undated) DEVICE — GLOVE PROTEXIS BLUE W/NEU-THERA 8.0  2D73EB80

## (undated) DEVICE — HOOD FLYTE 0408-800-000

## (undated) DEVICE — SUCTION TIP YANKAUER ORTHO SUPER SUCKER EFS-111

## (undated) DEVICE — BLADE SAW OSCIL/SAG STRK 11X90X1.19MM 4/2000 4111-119-090

## (undated) DEVICE — ADH SKIN CLOSURE PREMIERPRO EXOFIN 1.0ML 3470

## (undated) DEVICE — SU VICRYL 2-0 CP-2 18" UND J762D

## (undated) DEVICE — CAST PADDING 6" WEBRIL STERILE

## (undated) DEVICE — DRAPE EXTREMITY W/ARMBOARD 29405

## (undated) DEVICE — PREP CHLORAPREP 26ML TINTED ORANGE  260815

## (undated) DEVICE — GLOVE ESTEEM BLUE W/NEU-THERA 7.5  2D73PB75

## (undated) DEVICE — SOL NACL 0.9% IRRIG 3000ML BAG 07972-08

## (undated) DEVICE — BLADE SAW SAGITTAL STRK 18X90X1.19MM HD SYS 6 6118-119-090

## (undated) DEVICE — DRAPE POUCH INSTRUMENT 1018

## (undated) RX ORDER — FENTANYL CITRATE 50 UG/ML
INJECTION, SOLUTION INTRAMUSCULAR; INTRAVENOUS
Status: DISPENSED
Start: 2021-01-12

## (undated) RX ORDER — PROPOFOL 10 MG/ML
INJECTION, EMULSION INTRAVENOUS
Status: DISPENSED
Start: 2021-01-12

## (undated) RX ORDER — LIDOCAINE HYDROCHLORIDE 20 MG/ML
INJECTION, SOLUTION EPIDURAL; INFILTRATION; INTRACAUDAL; PERINEURAL
Status: DISPENSED
Start: 2021-01-12

## (undated) RX ORDER — FENTANYL CITRATE 50 UG/ML
INJECTION, SOLUTION INTRAMUSCULAR; INTRAVENOUS
Status: DISPENSED
Start: 2017-09-01